# Patient Record
Sex: FEMALE | Race: WHITE | NOT HISPANIC OR LATINO | Employment: FULL TIME | URBAN - METROPOLITAN AREA
[De-identification: names, ages, dates, MRNs, and addresses within clinical notes are randomized per-mention and may not be internally consistent; named-entity substitution may affect disease eponyms.]

---

## 2017-07-10 ENCOUNTER — TRANSCRIBE ORDERS (OUTPATIENT)
Dept: ADMINISTRATIVE | Facility: HOSPITAL | Age: 52
End: 2017-07-10

## 2017-07-10 DIAGNOSIS — N64.9 DISORDER OF BREAST: Primary | ICD-10-CM

## 2017-07-17 ENCOUNTER — HOSPITAL ENCOUNTER (OUTPATIENT)
Dept: MAMMOGRAPHY | Facility: HOSPITAL | Age: 52
Discharge: HOME | End: 2017-07-17
Payer: COMMERCIAL

## 2017-07-17 ENCOUNTER — HOSPITAL ENCOUNTER (OUTPATIENT)
Dept: ULTRASOUND IMAGING | Facility: HOSPITAL | Age: 52
Discharge: HOME | End: 2017-07-17
Payer: COMMERCIAL

## 2017-07-17 DIAGNOSIS — N64.9 DISORDER OF BREAST: ICD-10-CM

## 2017-07-17 PROCEDURE — G0206 DX MAMMO INCL CAD UNI: HCPCS

## 2017-07-17 PROCEDURE — 76642 ULTRASOUND BREAST LIMITED: CPT

## 2018-02-23 ENCOUNTER — HOSPITAL ENCOUNTER (OUTPATIENT)
Dept: RADIOLOGY | Facility: HOSPITAL | Age: 53
Discharge: HOME | End: 2018-02-23
Payer: COMMERCIAL

## 2018-02-23 ENCOUNTER — TRANSCRIBE ORDERS (OUTPATIENT)
Dept: ADMINISTRATIVE | Facility: HOSPITAL | Age: 53
End: 2018-02-23

## 2018-02-23 DIAGNOSIS — M79.602 LEFT ARM PAIN: ICD-10-CM

## 2018-02-23 DIAGNOSIS — M79.602 LEFT ARM PAIN: Primary | ICD-10-CM

## 2018-02-23 PROCEDURE — 73090 X-RAY EXAM OF FOREARM: CPT

## 2018-02-23 PROCEDURE — 73080 X-RAY EXAM OF ELBOW: CPT

## 2019-07-25 ENCOUNTER — TRANSCRIBE ORDERS (OUTPATIENT)
Dept: ADMINISTRATIVE | Facility: HOSPITAL | Age: 54
End: 2019-07-25

## 2019-07-25 DIAGNOSIS — Z12.31 ENCOUNTER FOR SCREENING MAMMOGRAM FOR MALIGNANT NEOPLASM OF BREAST: Primary | ICD-10-CM

## 2019-08-13 ENCOUNTER — HOSPITAL ENCOUNTER (OUTPATIENT)
Dept: MAMMOGRAPHY | Facility: HOSPITAL | Age: 54
Discharge: HOME | End: 2019-08-13
Payer: COMMERCIAL

## 2019-08-13 VITALS — BODY MASS INDEX: 33.34 KG/M2 | HEIGHT: 68 IN | WEIGHT: 220 LBS

## 2019-08-13 DIAGNOSIS — Z12.31 ENCOUNTER FOR SCREENING MAMMOGRAM FOR MALIGNANT NEOPLASM OF BREAST: ICD-10-CM

## 2019-08-13 PROCEDURE — 77067 SCR MAMMO BI INCL CAD: CPT

## 2019-09-07 ENCOUNTER — HOSPITAL ENCOUNTER (INPATIENT)
Facility: HOSPITAL | Age: 54
LOS: 9 days | Discharge: HOME | DRG: 872 | End: 2019-09-16
Attending: EMERGENCY MEDICINE | Admitting: FAMILY MEDICINE
Payer: COMMERCIAL

## 2019-09-07 ENCOUNTER — APPOINTMENT (EMERGENCY)
Dept: RADIOLOGY | Facility: HOSPITAL | Age: 54
DRG: 872 | End: 2019-09-07
Payer: COMMERCIAL

## 2019-09-07 ENCOUNTER — APPOINTMENT (EMERGENCY)
Dept: CT IMAGING | Facility: HOSPITAL | Age: 54
DRG: 872 | End: 2019-09-07
Payer: COMMERCIAL

## 2019-09-07 DIAGNOSIS — R74.01 TRANSAMINITIS: ICD-10-CM

## 2019-09-07 DIAGNOSIS — E87.1 HYPONATREMIA: ICD-10-CM

## 2019-09-07 DIAGNOSIS — A41.9 SEPSIS (HCC): Primary | ICD-10-CM

## 2019-09-07 DIAGNOSIS — N13.30 HYDRONEPHROSIS: ICD-10-CM

## 2019-09-07 DIAGNOSIS — R50.9 FEVER: ICD-10-CM

## 2019-09-07 DIAGNOSIS — R10.11 RIGHT UPPER QUADRANT ABDOMINAL PAIN: ICD-10-CM

## 2019-09-07 DIAGNOSIS — R17 ELEVATED BILIRUBIN: ICD-10-CM

## 2019-09-07 DIAGNOSIS — N39.0 UTI (URINARY TRACT INFECTION): ICD-10-CM

## 2019-09-07 PROBLEM — R79.89 ELEVATED D-DIMER: Status: ACTIVE | Noted: 2019-09-07

## 2019-09-07 PROBLEM — N30.00 ACUTE CYSTITIS WITHOUT HEMATURIA: Status: ACTIVE | Noted: 2019-09-07

## 2019-09-07 LAB
ALBUMIN SERPL BCP-MCNC: 3.5 G/DL (ref 3.5–5)
ALP SERPL-CCNC: 222 U/L (ref 46–116)
ALT SERPL W P-5'-P-CCNC: 63 U/L (ref 12–78)
ANION GAP SERPL CALCULATED.3IONS-SCNC: 12 MMOL/L (ref 4–13)
APTT PPP: 30 SECONDS (ref 23–37)
AST SERPL W P-5'-P-CCNC: 69 U/L (ref 5–45)
BACTERIA UR QL AUTO: ABNORMAL /HPF
BASOPHILS # BLD MANUAL: 0.05 THOUSAND/UL (ref 0–0.1)
BASOPHILS NFR MAR MANUAL: 1 % (ref 0–1)
BILIRUB SERPL-MCNC: 2 MG/DL (ref 0.2–1)
BILIRUB UR QL STRIP: ABNORMAL
BUN SERPL-MCNC: 10 MG/DL (ref 5–25)
CALCIUM SERPL-MCNC: 8.7 MG/DL (ref 8.3–10.1)
CHLORIDE SERPL-SCNC: 96 MMOL/L (ref 100–108)
CLARITY UR: CLEAR
CO2 SERPL-SCNC: 24 MMOL/L (ref 21–32)
COLOR UR: ABNORMAL
CREAT SERPL-MCNC: 1.12 MG/DL (ref 0.6–1.3)
DEPRECATED D DIMER PPP: 5386 NG/ML (FEU)
EOSINOPHIL # BLD MANUAL: 0.1 THOUSAND/UL (ref 0–0.4)
EOSINOPHIL NFR BLD MANUAL: 2 % (ref 0–6)
ERYTHROCYTE [DISTWIDTH] IN BLOOD BY AUTOMATED COUNT: 14.7 % (ref 11.6–15.1)
GFR SERPL CREATININE-BSD FRML MDRD: 55 ML/MIN/1.73SQ M
GLUCOSE SERPL-MCNC: 142 MG/DL (ref 65–140)
GLUCOSE UR STRIP-MCNC: NEGATIVE MG/DL
HCT VFR BLD AUTO: 47.7 % (ref 34.8–46.1)
HGB BLD-MCNC: 15.3 G/DL (ref 11.5–15.4)
HGB UR QL STRIP.AUTO: NEGATIVE
INR PPP: 1.02 (ref 0.84–1.19)
KETONES UR STRIP-MCNC: ABNORMAL MG/DL
LACTATE SERPL-SCNC: 1.1 MMOL/L (ref 0.5–2)
LACTATE SERPL-SCNC: 1.2 MMOL/L (ref 0.5–2)
LEUKOCYTE ESTERASE UR QL STRIP: ABNORMAL
LYMPHOCYTES # BLD AUTO: 1.46 THOUSAND/UL (ref 0.6–4.47)
LYMPHOCYTES # BLD AUTO: 29 % (ref 14–44)
MCH RBC QN AUTO: 27.4 PG (ref 26.8–34.3)
MCHC RBC AUTO-ENTMCNC: 32.1 G/DL (ref 31.4–37.4)
MCV RBC AUTO: 86 FL (ref 82–98)
MONOCYTES # BLD AUTO: 0.45 THOUSAND/UL (ref 0–1.22)
MONOCYTES NFR BLD: 9 % (ref 4–12)
NEUTROPHILS # BLD MANUAL: 2.82 THOUSAND/UL (ref 1.85–7.62)
NEUTS SEG NFR BLD AUTO: 56 % (ref 43–75)
NITRITE UR QL STRIP: POSITIVE
NON-SQ EPI CELLS URNS QL MICRO: ABNORMAL /HPF
NRBC BLD AUTO-RTO: 0 /100 WBCS
PH UR STRIP.AUTO: 5.5 [PH]
PLATELET # BLD AUTO: 115 THOUSANDS/UL (ref 149–390)
PLATELET # BLD AUTO: 96 THOUSANDS/UL (ref 149–390)
PLATELET BLD QL SMEAR: ABNORMAL
PMV BLD AUTO: 10.6 FL (ref 8.9–12.7)
PMV BLD AUTO: 10.7 FL (ref 8.9–12.7)
POTASSIUM SERPL-SCNC: 3.8 MMOL/L (ref 3.5–5.3)
PROT SERPL-MCNC: 7.9 G/DL (ref 6.4–8.2)
PROT UR STRIP-MCNC: ABNORMAL MG/DL
PROTHROMBIN TIME: 13.4 SECONDS (ref 11.6–14.5)
RBC # BLD AUTO: 5.58 MILLION/UL (ref 3.81–5.12)
RBC #/AREA URNS AUTO: ABNORMAL /HPF
SODIUM SERPL-SCNC: 132 MMOL/L (ref 136–145)
SP GR UR STRIP.AUTO: >=1.03 (ref 1–1.03)
TOTAL CELLS COUNTED SPEC: 100
TROPONIN I SERPL-MCNC: <0.02 NG/ML
UROBILINOGEN UR QL STRIP.AUTO: 4 E.U./DL
VARIANT LYMPHS # BLD AUTO: 3 %
WBC # BLD AUTO: 5.03 THOUSAND/UL (ref 4.31–10.16)
WBC #/AREA URNS AUTO: ABNORMAL /HPF

## 2019-09-07 PROCEDURE — 99222 1ST HOSP IP/OBS MODERATE 55: CPT | Performed by: PHYSICIAN ASSISTANT

## 2019-09-07 PROCEDURE — 36415 COLL VENOUS BLD VENIPUNCTURE: CPT | Performed by: EMERGENCY MEDICINE

## 2019-09-07 PROCEDURE — 85730 THROMBOPLASTIN TIME PARTIAL: CPT | Performed by: EMERGENCY MEDICINE

## 2019-09-07 PROCEDURE — 84145 PROCALCITONIN (PCT): CPT | Performed by: EMERGENCY MEDICINE

## 2019-09-07 PROCEDURE — 85379 FIBRIN DEGRADATION QUANT: CPT | Performed by: EMERGENCY MEDICINE

## 2019-09-07 PROCEDURE — NC001 PR NO CHARGE: Performed by: EMERGENCY MEDICINE

## 2019-09-07 PROCEDURE — 99285 EMERGENCY DEPT VISIT HI MDM: CPT

## 2019-09-07 PROCEDURE — 85049 AUTOMATED PLATELET COUNT: CPT | Performed by: PHYSICIAN ASSISTANT

## 2019-09-07 PROCEDURE — 99285 EMERGENCY DEPT VISIT HI MDM: CPT | Performed by: EMERGENCY MEDICINE

## 2019-09-07 PROCEDURE — 83605 ASSAY OF LACTIC ACID: CPT | Performed by: EMERGENCY MEDICINE

## 2019-09-07 PROCEDURE — 85007 BL SMEAR W/DIFF WBC COUNT: CPT | Performed by: EMERGENCY MEDICINE

## 2019-09-07 PROCEDURE — 71275 CT ANGIOGRAPHY CHEST: CPT

## 2019-09-07 PROCEDURE — 84145 PROCALCITONIN (PCT): CPT | Performed by: PHYSICIAN ASSISTANT

## 2019-09-07 PROCEDURE — 71046 X-RAY EXAM CHEST 2 VIEWS: CPT

## 2019-09-07 PROCEDURE — 96365 THER/PROPH/DIAG IV INF INIT: CPT

## 2019-09-07 PROCEDURE — 87040 BLOOD CULTURE FOR BACTERIA: CPT | Performed by: EMERGENCY MEDICINE

## 2019-09-07 PROCEDURE — 84300 ASSAY OF URINE SODIUM: CPT | Performed by: PHYSICIAN ASSISTANT

## 2019-09-07 PROCEDURE — 85610 PROTHROMBIN TIME: CPT | Performed by: EMERGENCY MEDICINE

## 2019-09-07 PROCEDURE — 93005 ELECTROCARDIOGRAM TRACING: CPT

## 2019-09-07 PROCEDURE — 80053 COMPREHEN METABOLIC PANEL: CPT | Performed by: EMERGENCY MEDICINE

## 2019-09-07 PROCEDURE — 85027 COMPLETE CBC AUTOMATED: CPT | Performed by: EMERGENCY MEDICINE

## 2019-09-07 PROCEDURE — 84484 ASSAY OF TROPONIN QUANT: CPT | Performed by: EMERGENCY MEDICINE

## 2019-09-07 PROCEDURE — 81001 URINALYSIS AUTO W/SCOPE: CPT | Performed by: EMERGENCY MEDICINE

## 2019-09-07 PROCEDURE — 74176 CT ABD & PELVIS W/O CONTRAST: CPT

## 2019-09-07 PROCEDURE — 83935 ASSAY OF URINE OSMOLALITY: CPT | Performed by: PHYSICIAN ASSISTANT

## 2019-09-07 RX ORDER — SODIUM CHLORIDE 9 MG/ML
100 INJECTION, SOLUTION INTRAVENOUS CONTINUOUS
Status: DISCONTINUED | OUTPATIENT
Start: 2019-09-07 | End: 2019-09-13

## 2019-09-07 RX ORDER — LEVOFLOXACIN 5 MG/ML
750 INJECTION, SOLUTION INTRAVENOUS ONCE
Status: COMPLETED | OUTPATIENT
Start: 2019-09-07 | End: 2019-09-07

## 2019-09-07 RX ORDER — OXYCODONE HYDROCHLORIDE AND ACETAMINOPHEN 5; 325 MG/1; MG/1
1 TABLET ORAL ONCE
Status: COMPLETED | OUTPATIENT
Start: 2019-09-07 | End: 2019-09-08

## 2019-09-07 RX ORDER — ONDANSETRON 2 MG/ML
4 INJECTION INTRAMUSCULAR; INTRAVENOUS EVERY 6 HOURS PRN
Status: DISCONTINUED | OUTPATIENT
Start: 2019-09-07 | End: 2019-09-16 | Stop reason: HOSPADM

## 2019-09-07 RX ORDER — ACETAMINOPHEN 325 MG/1
650 TABLET ORAL ONCE
Status: COMPLETED | OUTPATIENT
Start: 2019-09-07 | End: 2019-09-07

## 2019-09-07 RX ORDER — ACETAMINOPHEN 325 MG/1
650 TABLET ORAL EVERY 6 HOURS PRN
Status: DISCONTINUED | OUTPATIENT
Start: 2019-09-07 | End: 2019-09-16 | Stop reason: HOSPADM

## 2019-09-07 RX ADMIN — SODIUM CHLORIDE 125 ML/HR: 0.9 INJECTION, SOLUTION INTRAVENOUS at 21:51

## 2019-09-07 RX ADMIN — LEVOFLOXACIN 750 MG: 5 INJECTION, SOLUTION INTRAVENOUS at 17:12

## 2019-09-07 RX ADMIN — SODIUM CHLORIDE, SODIUM LACTATE, POTASSIUM CHLORIDE, AND CALCIUM CHLORIDE 1000 ML: .6; .31; .03; .02 INJECTION, SOLUTION INTRAVENOUS at 17:12

## 2019-09-07 RX ADMIN — ACETAMINOPHEN 650 MG: 325 TABLET, FILM COATED ORAL at 17:06

## 2019-09-07 RX ADMIN — IOHEXOL 85 ML: 350 INJECTION, SOLUTION INTRAVENOUS at 19:47

## 2019-09-07 RX ADMIN — SODIUM CHLORIDE, SODIUM LACTATE, POTASSIUM CHLORIDE, AND CALCIUM CHLORIDE 1000 ML: .6; .31; .03; .02 INJECTION, SOLUTION INTRAVENOUS at 17:32

## 2019-09-07 RX ADMIN — SODIUM CHLORIDE, SODIUM LACTATE, POTASSIUM CHLORIDE, AND CALCIUM CHLORIDE 1000 ML: .6; .31; .03; .02 INJECTION, SOLUTION INTRAVENOUS at 19:24

## 2019-09-07 RX ADMIN — ACETAMINOPHEN 650 MG: 325 TABLET, FILM COATED ORAL at 23:07

## 2019-09-07 NOTE — ED PROVIDER NOTES
History  Chief Complaint   Patient presents with    Fever - 9 weeks to 74 years     fever for 4 days and urinary frequency     Patient: Rsoie Koo  53 y o /female  YOB: 1965  MRN: [id number]  PCP: Harinder Comer DO  Date of evaluation: 2019    (N B   Voice-recognition software may have been used in the preparation of this document  Occasional wrong word or "sound-alike" substitutions may have occurred due to the inherent limitations of voice recognition software  Interpretation should be guided by context )    History provided by:  Patient and spouse  Fever - 9 weeks to 76 years   Max temp prior to arrival:  104 8  Severity:  Severe  Onset quality:  Unable to specify  Duration:  4 days  Timing:  Unable to specify  Progression:  Unable to specify  Chronicity:  New  Relieved by:  Nothing  Worsened by:  Nothing  Associated symptoms: chills, dysuria, headaches (frontal) and myalgias    Associated symptoms: no chest pain, no confusion, no cough, no diarrhea, no nausea, no rash and no vomiting    Dysuria:     Severity:  Severe    Onset quality:  Gradual    Duration:  4 days    Timing:  Constant (sensation of incomplete voiding; urgency)    Progression:  Worsening    Chronicity:  New      None       No past medical history on file  Past Surgical History:   Procedure Laterality Date    APPENDECTOMY       SECTION      HYSTERECTOMY             Family History   Problem Relation Age of Onset    No Known Problems Mother     No Known Problems Sister     No Known Problems Sister     No Known Problems Maternal Aunt     No Known Problems Maternal Aunt     No Known Problems Paternal Aunt     Cancer Paternal Aunt     No Known Problems Paternal Aunt      I have reviewed and agree with the history as documented      Social History     Tobacco Use    Smoking status: Never Smoker    Smokeless tobacco: Never Used   Substance Use Topics    Alcohol use: Yes     Comment: occassional    Drug use: Never        Review of Systems   Constitutional: Positive for chills, fatigue and fever  HENT: Negative for hearing loss, trouble swallowing and voice change  Eyes: Negative for pain, redness and visual disturbance  Respiratory: Positive for shortness of breath (and raspy when she breathes)  Negative for cough  Cardiovascular: Negative for chest pain and palpitations  Gastrointestinal: Negative for abdominal pain, constipation, diarrhea, nausea and vomiting  Genitourinary: Positive for decreased urine volume, dysuria, frequency and urgency  Negative for hematuria, vaginal bleeding and vaginal discharge  Sensation of incomplete voiding   Musculoskeletal: Positive for myalgias  Negative for back pain, gait problem and neck pain  Skin: Negative for color change and rash  Neurological: Positive for headaches (frontal)  Negative for weakness and light-headedness  Psychiatric/Behavioral: Negative for agitation, confusion and decreased concentration  The patient is not nervous/anxious  All other systems reviewed and are negative  Physical Exam  Physical Exam   Constitutional: She is oriented to person, place, and time  She appears well-developed and well-nourished  She is cooperative  Non-toxic appearance  She appears ill  No distress  HENT:   Mouth/Throat: Oropharynx is clear and moist and mucous membranes are normal    Voice normal   Eyes: Pupils are equal, round, and reactive to light  EOM are normal    Neck: Normal range of motion, full passive range of motion without pain and phonation normal  Neck supple  No Brudzinski's sign and no Kernig's sign noted  Cardiovascular: Normal rate and regular rhythm  Pulmonary/Chest: Effort normal  She has rales  Abdominal: Soft  Bowel sounds are normal    Neurological: She is alert and oriented to person, place, and time  GCS eye subscore is 4  GCS verbal subscore is 5  GCS motor subscore is 6     Skin: Skin is warm and dry  Capillary refill takes 2 to 3 seconds  She is not diaphoretic  Psychiatric: She has a normal mood and affect  Her speech is normal and behavior is normal    Nursing note and vitals reviewed  Vital Signs  ED Triage Vitals [09/07/19 1649]   Temperature Pulse Respirations Blood Pressure SpO2   (!) 100 9 °F (38 3 °C) (!) 108 20 127/79 96 %      Temp Source Heart Rate Source Patient Position - Orthostatic VS BP Location FiO2 (%)   Oral Right Sitting Right arm --      Pain Score       8           Vitals:    09/07/19 1815 09/07/19 1830 09/07/19 1845 09/07/19 1900   BP: 134/64 130/62 122/58 116/62   Pulse: 86 87 88 84   Patient Position - Orthostatic VS: Sitting Sitting Sitting Sitting         Visual Acuity      ED Medications  Medications   lactated ringers bolus 1,000 mL (0 mL Intravenous Stopped 9/7/19 1742)     Followed by   lactated ringers bolus 1,000 mL (0 mL Intravenous Stopped 9/7/19 1802)   levofloxacin (LEVAQUIN) IVPB (premix) 750 mg (0 mg Intravenous Stopped 9/7/19 1842)   acetaminophen (TYLENOL) tablet 650 mg (650 mg Oral Given 9/7/19 1706)   lactated ringers bolus 1,000 mL (1,000 mL Intravenous New Bag 9/7/19 1924)       Diagnostic Studies  Results Reviewed     Procedure Component Value Units Date/Time    Lactic acid x2 [[id number]] Collected:  09/07/19 1922    Lab Status:   In process Specimen:  Blood from Arm, Right Updated:  09/07/19 1928    D-dimer, quantitative [[id number]]  (Abnormal) Collected:  09/07/19 1841    Lab Status:  Final result Specimen:  Blood Updated:  09/07/19 1912     D-Dimer, Quant 5,386 ng/ml (FEU)     CBC and differential [[id number]]  (Abnormal) Collected:  09/07/19 1704    Lab Status:  Final result Specimen:  Blood from Arm, Right Updated:  09/07/19 1731     WBC 5 03 Thousand/uL      RBC 5 58 Million/uL      Hemoglobin 15 3 g/dL      Hematocrit 47 7 %      MCV 86 fL      MCH 27 4 pg      MCHC 32 1 g/dL      RDW 14 7 %      MPV 10 7 fL      Platelets 793 Thousands/uL nRBC 0 /100 WBCs     Lactic acid x2 [[id number]]  (Normal) Collected:  09/07/19 1704    Lab Status:  Final result Specimen:  Blood from Arm, Right Updated:  09/07/19 1731     LACTIC ACID 1 2 mmol/L     Narrative:       Result may be elevated if tourniquet was used during collection      Troponin I [[id number]]  (Normal) Collected:  09/07/19 1704    Lab Status:  Final result Specimen:  Blood from Arm, Right Updated:  09/07/19 1729     Troponin I <0 02 ng/mL     Urine Microscopic [[id number]]  (Abnormal) Collected:  09/07/19 1711    Lab Status:  Final result Specimen:  Urine, Clean Catch Updated:  09/07/19 1727     RBC, UA 1-2 /hpf      WBC, UA 2-4 /hpf      Epithelial Cells Occasional /hpf      Bacteria, UA Innumerable /hpf     Comprehensive metabolic panel [[id number]]  (Abnormal) Collected:  09/07/19 1704    Lab Status:  Final result Specimen:  Blood from Arm, Right Updated:  09/07/19 1727     Sodium 132 mmol/L      Potassium 3 8 mmol/L      Chloride 96 mmol/L      CO2 24 mmol/L      ANION GAP 12 mmol/L      BUN 10 mg/dL      Creatinine 1 12 mg/dL      Glucose 142 mg/dL      Calcium 8 7 mg/dL      AST 69 U/L      ALT 63 U/L      Alkaline Phosphatase 222 U/L      Total Protein 7 9 g/dL      Albumin 3 5 g/dL      Total Bilirubin 2 00 mg/dL      eGFR 55 ml/min/1 73sq m     Narrative:       [place] guidelines for Chronic Kidney Disease (CKD):     Stage 1 with normal or high GFR (GFR > 90 mL/min/1 73 square meters)    Stage 2 Mild CKD (GFR = 60-89 mL/min/1 73 square meters)    Stage 3A Moderate CKD (GFR = 45-59 mL/min/1 73 square meters)    Stage 3B Moderate CKD (GFR = 30-44 mL/min/1 73 square meters)    Stage 4 Severe CKD (GFR = 15-29 mL/min/1 73 square meters)    Stage 5 End Stage CKD (GFR <15 mL/min/1 73 square meters)  Note: GFR calculation is accurate only with a steady state creatinine    Protime-INR [[id number]]  (Normal) Collected:  09/07/19 1704    Lab Status:  Final result Specimen: Blood from Arm, Right Updated:  09/07/19 1722     Protime 13 4 seconds      INR 1 02    APTT [[id number]]  (Normal) Collected:  09/07/19 1704    Lab Status:  Final result Specimen:  Blood from Arm, Right Updated:  09/07/19 1722     PTT 30 seconds     UA w Reflex to Microscopic w Reflex to Culture [[id number]]  (Abnormal) Collected:  09/07/19 1711    Lab Status:  Final result Specimen:  Urine, Clean Catch Updated:  09/07/19 1721     Color, UA Didi     Clarity, UA Clear     Specific Gravity, UA >=1 030     pH, UA 5 5     Leukocytes, UA Trace     Nitrite, UA Positive     Protein,  (2+) mg/dl      Glucose, UA Negative mg/dl      Ketones, UA 15 (1+) mg/dl      Urobilinogen, UA 4 0 E U /dl      Bilirubin, UA Interference- unable to analyze     Blood, UA Negative    Procalcitonin [[id number]] Collected:  09/07/19 1704    Lab Status: In process Specimen:  Blood from Arm, Right Updated:  09/07/19 1709    Blood culture #1 [[id number]] Collected:  09/07/19 1704    Lab Status: In process Specimen:  Blood from Arm, Left Updated:  09/07/19 1708    Blood culture #2 [[id number]] Collected:  09/07/19 1704    Lab Status: In process Specimen:  Blood from Arm, Right Updated:  09/07/19 1708                 XR chest pa & lateral   Final Result by Jose Aguilar MD (09/07 1821)      No active pulmonary disease  Workstation performed: [id number]         CT renal stone study abdomen pelvis wo contrast    (Results Pending)   CTA ed chest pe study    (Results Pending)              Procedures  Procedures       ED Course  ED Course as of Sep 07 1939   Sat Sep 07, 2019   1738 Leukocytes, UA(!): Trace   1738 Nitrite, UA(!): Positive   1738 Ketones, UA(!): 15 (1+)   1738 Bacteria, UA(!): Innumerable   1738 WBC: 5 03   1738 Hemoglobin: 15 3   1738 Platelet Count(!): 011   1739 Alkaline Phosphatase(!): 222   1739 Creatinine: 1 12   1912 Sepsis Alert called 1907 1923 Signed out to Dr Candis Jarquin pending CT scans    Antibiotics have been given   Pt was re-assessed after her sepsis bolus  Given low UO, she is receiving more fluids  Given low sat and inability to [facility] out, she will get a PE study as well as her stone study  HEART Risk Score      Most Recent Value   History  0 Filed at: 09/07/2019 1938   ECG  0 Filed at: 09/07/2019 1938   Age  1 Filed at: 09/07/2019 1938   Risk Factors  0 Filed at: 09/07/2019 1938   Troponin  0 Filed at: 09/07/2019 1938   Heart Score Risk Calculator   History  0 Filed at: 09/07/2019 1938   ECG  0 Filed at: 09/07/2019 1938   Age  1 Filed at: 09/07/2019 1938   Risk Factors  0 Filed at: 09/07/2019 1938   Troponin  0 Filed at: 09/07/2019 1938   HEART Score  1 Filed at: 09/07/2019 1938   HEART Score  1 Filed at: 09/07/2019 1938            PERC Rule for PE      Most Recent Value   PERC Rule for PE   Age >=50  1 Filed at: 09/07/2019 1922   HR >=100  1 Filed at: 09/07/2019 1922   O2 Sat on room air < 95%  1 Filed at: 09/07/2019 1922   History of PE or DVT  0 Filed at: 09/07/2019 1922   Recent trauma or surgery  0 Filed at: 09/07/2019 1922   Hemoptysis  0 Filed at: 09/07/2019 1922   Exogenous estrogen  0 Filed at: 09/07/2019 1922   Unilateral leg swelling  0 Filed at: 09/07/2019 1 [facility] Rule for PE Results  3 Filed at: 09/07/2019 1922          Initial Sepsis Screening     Row Name 09/07/19 1904 09/07/19 4536             Is the patient's history suggestive of a new or worsening infection?   (!) Yes (Proceed)  -DA       Suspected source of infection    urinary tract infection  -DA       Are two or more of the following signs & symptoms of infection both present and new to the patient?          Indicate SIRS criteria    Hyperthemia > 38 3C (100 9F); Tachycardia > 90 bpm  -DA       If the answer is yes to both questions, suspicion of sepsis is present  [de-identified]         If severe sepsis is present AND tissue hypoperfusion perists in the hour after fluid resuscitation or lactate > 4, the patient meets criteria for SEPTIC SHOCK           Are any of the following organ dysfunction criteria present within 6 hours of suspected infection and SIRS criteria that are NOT considered to be chronic conditions?          Organ dysfunction  Urine output < 0 5 mL/kg/hour for 2 hours just at borderline  -DA         Date of presentation of severe sepsis  09/07/19  -DA         Time of presentation of severe sepsis  1907  -DA         Tissue hypoperfusion persists in the hour after crystalloid fluid administration, evidenced, by either:           Was hypotension present within one hour of the conclusion of crystalloid fluid administration? Tejal Moss       Date of presentation of septic shock           Time of presentation of septic shock             User Key  (r) = Recorded By, (t) = Taken By, (c) = Cosigned By    [address] Name Provider Type    DA Ford Mooney MD Physician           Default Flowsheet Data (last 720 hours)      Sepsis Reassess     Row Name 09/07/19 1931 09/07/19 1816                Repeat Volume Status and Tissue Perfusion Assessment Performed    Repeat Volume Status and Tissue Perfusion Assessment Performed    Yes  -DA          Volume Status and Tissue Perfusion Post Fluid Resuscitation * Must Document All *    Vital Signs Reviewed (HR, RR, BP, T)  Yes  -DA  Yes  -DA       Shock Index Reviewed  Yes  -DA  Yes  -DA       Arterial Oxygen Saturation Reviewed (POx, SaO2 or SpO2)  Yes (comment %)  -DA  Yes (comment %)  -DA       Cardio  Normal S1/S2; Regular rate and rhythm  -DA  Normal S1/S2; Regular rate and rhythm  -DA       Pulmonary  (!) Normal effort;Rales  -DA  (!) Normal effort;Rales  -DA       Capillary Refill  Brisk  -DA  Brisk  -DA       Peripheral Pulses  Radial;Dorsalis Pedis  -DA  Radial;Dorsalis Pedis  -DA       Peripheral Pulse  +2  -DA  +2  -DA       Dorsalis Pedis  +2  -DA  +2  -DA       Skin  Warm;Dry  -DA  Warm;Dry  -DA       Urine output assessed   borderline  -DA  Other (comment) pending 2 hour raphael -DA          *OR*   Intensive Monitoring- Must Document One of the Following Four *:    Vital Signs Reviewed           * Central Venous Pressure (CVP or RAP)           * Central Venous Oxygen (SVO2, ScvO2 or Oxygen saturation via central catheter)           * Bedside Cardiovascular US in IVC diameter and % collapse           * Passive Leg Raise OR Crystalloid Challenge             User Key  (r) = Recorded By, (t) = Taken By, (c) = Cosigned By    Initials Name Provider Type    DA Trace Goel MD Physician          Angella Burns' Criteria for PE      Most Recent Value   Wells' Criteria for PE   Clinical signs and symptoms of DVT  0 Filed at: 09/07/2019 1921   PE is primary diagnosis or equally likely  0 Filed at: 09/07/2019 1921   HR >100  1 5 Filed at: 09/07/2019 1921   Immobilization at least 3 days or Surgery in the previous 4 weeks  0 Filed at: 09/07/2019 1921   Previous, objectively diagnosed PE or DVT  0 Filed at: 09/07/2019 1921   Hemoptysis  0 Filed at: 09/07/2019 1921   Malignancy with treatment within 6 months or palliative  0 Filed at: 09/07/2019 1921   Wells' Criteria Total  1 5 Filed at: 09/07/2019 1921            MDM  Number of Diagnoses or Management Options     Amount and/or Complexity of Data Reviewed  Tests in the radiology section of CPT®: ordered and reviewed  Tests in the medicine section of CPT®: ordered and reviewed  Independent visualization of images, tracings, or specimens: yes    Risk of Complications, Morbidity, and/or Mortality  Presenting problems: high    Patient Progress  Patient progress: improved      Disposition  Final diagnoses:   None     ED Disposition     None      Follow-up Information    None         Patient's Medications    No medications on file     No discharge procedures on file      ED Provider  Electronically Signed by           rTace Goel MD  09/10/19 1964

## 2019-09-07 NOTE — ED PROCEDURE NOTE
PROCEDURE  ECG 12 Lead Documentation Only  Date/Time: 9/7/2019 4:58 PM  Performed by: Richard Rome MD  Authorized by: Richard Rome MD     Indications / Diagnosis:  Tachycardia  ECG reviewed by me, the ED Provider: yes    Patient location:  ED  Previous ECG:     Comparison to cardiac monitor: Yes    Interpretation:     Interpretation: normal    Rate:     ECG rate:  99    ECG rate assessment: normal    Rhythm:     Rhythm: sinus rhythm    Ectopy:     Ectopy: none    QRS:     QRS axis:  Normal    QRS intervals:  Normal  Conduction:     Conduction: normal    ST segments:     ST segments:  Normal  T waves:     T waves: normal           Richard Rome MD  09/07/19 7992

## 2019-09-08 ENCOUNTER — APPOINTMENT (INPATIENT)
Dept: NON INVASIVE DIAGNOSTICS | Facility: HOSPITAL | Age: 54
DRG: 872 | End: 2019-09-08
Payer: COMMERCIAL

## 2019-09-08 PROBLEM — N13.30 HYDROURETERONEPHROSIS: Status: ACTIVE | Noted: 2019-09-08

## 2019-09-08 LAB
ALBUMIN SERPL BCP-MCNC: 2.8 G/DL (ref 3.5–5)
ALP SERPL-CCNC: 195 U/L (ref 46–116)
ALT SERPL W P-5'-P-CCNC: 49 U/L (ref 12–78)
ANION GAP SERPL CALCULATED.3IONS-SCNC: 7 MMOL/L (ref 4–13)
AST SERPL W P-5'-P-CCNC: 58 U/L (ref 5–45)
BILIRUB SERPL-MCNC: 2.6 MG/DL (ref 0.2–1)
BUN SERPL-MCNC: 8 MG/DL (ref 5–25)
CALCIUM SERPL-MCNC: 8.2 MG/DL (ref 8.3–10.1)
CHLORIDE SERPL-SCNC: 100 MMOL/L (ref 100–108)
CO2 SERPL-SCNC: 27 MMOL/L (ref 21–32)
CREAT SERPL-MCNC: 0.94 MG/DL (ref 0.6–1.3)
ERYTHROCYTE [DISTWIDTH] IN BLOOD BY AUTOMATED COUNT: 14.7 % (ref 11.6–15.1)
GFR SERPL CREATININE-BSD FRML MDRD: 69 ML/MIN/1.73SQ M
GLUCOSE SERPL-MCNC: 96 MG/DL (ref 65–140)
HCT VFR BLD AUTO: 42.1 % (ref 34.8–46.1)
HGB BLD-MCNC: 13.3 G/DL (ref 11.5–15.4)
MAGNESIUM SERPL-MCNC: 1.9 MG/DL (ref 1.6–2.6)
MCH RBC QN AUTO: 27.4 PG (ref 26.8–34.3)
MCHC RBC AUTO-ENTMCNC: 31.6 G/DL (ref 31.4–37.4)
MCV RBC AUTO: 87 FL (ref 82–98)
OSMOLALITY UR: 280 MMOL/KG
PHOSPHATE SERPL-MCNC: 3.1 MG/DL (ref 2.7–4.5)
PLATELET # BLD AUTO: 90 THOUSANDS/UL (ref 149–390)
PMV BLD AUTO: 11.7 FL (ref 8.9–12.7)
POTASSIUM SERPL-SCNC: 3.6 MMOL/L (ref 3.5–5.3)
PROCALCITONIN SERPL-MCNC: 0.47 NG/ML
PROCALCITONIN SERPL-MCNC: 0.5 NG/ML
PROCALCITONIN SERPL-MCNC: 0.67 NG/ML
PROT SERPL-MCNC: 6.5 G/DL (ref 6.4–8.2)
RBC # BLD AUTO: 4.85 MILLION/UL (ref 3.81–5.12)
SODIUM 24H UR-SCNC: 72 MOL/L
SODIUM SERPL-SCNC: 134 MMOL/L (ref 136–145)
WBC # BLD AUTO: 3.61 THOUSAND/UL (ref 4.31–10.16)

## 2019-09-08 PROCEDURE — G8978 MOBILITY CURRENT STATUS: HCPCS | Performed by: PHYSICAL THERAPIST

## 2019-09-08 PROCEDURE — 84100 ASSAY OF PHOSPHORUS: CPT | Performed by: PHYSICIAN ASSISTANT

## 2019-09-08 PROCEDURE — 83735 ASSAY OF MAGNESIUM: CPT | Performed by: PHYSICIAN ASSISTANT

## 2019-09-08 PROCEDURE — 93970 EXTREMITY STUDY: CPT | Performed by: SURGERY

## 2019-09-08 PROCEDURE — 84145 PROCALCITONIN (PCT): CPT | Performed by: PHYSICIAN ASSISTANT

## 2019-09-08 PROCEDURE — 87086 URINE CULTURE/COLONY COUNT: CPT | Performed by: FAMILY MEDICINE

## 2019-09-08 PROCEDURE — 93970 EXTREMITY STUDY: CPT

## 2019-09-08 PROCEDURE — G8980 MOBILITY D/C STATUS: HCPCS | Performed by: PHYSICAL THERAPIST

## 2019-09-08 PROCEDURE — 99232 SBSQ HOSP IP/OBS MODERATE 35: CPT | Performed by: PHYSICIAN ASSISTANT

## 2019-09-08 PROCEDURE — G8979 MOBILITY GOAL STATUS: HCPCS | Performed by: PHYSICAL THERAPIST

## 2019-09-08 PROCEDURE — 80053 COMPREHEN METABOLIC PANEL: CPT | Performed by: PHYSICIAN ASSISTANT

## 2019-09-08 PROCEDURE — 97162 PT EVAL MOD COMPLEX 30 MIN: CPT | Performed by: PHYSICAL THERAPIST

## 2019-09-08 PROCEDURE — 85027 COMPLETE CBC AUTOMATED: CPT | Performed by: PHYSICIAN ASSISTANT

## 2019-09-08 RX ORDER — HYDROMORPHONE HCL/PF 1 MG/ML
0.5 SYRINGE (ML) INJECTION EVERY 4 HOURS PRN
Status: DISCONTINUED | OUTPATIENT
Start: 2019-09-08 | End: 2019-09-16 | Stop reason: HOSPADM

## 2019-09-08 RX ORDER — AZTREONAM 1 G/1
1000 INJECTION, POWDER, LYOPHILIZED, FOR SOLUTION INTRAMUSCULAR; INTRAVENOUS EVERY 8 HOURS
Status: DISCONTINUED | OUTPATIENT
Start: 2019-09-08 | End: 2019-09-08

## 2019-09-08 RX ORDER — LEVOFLOXACIN 5 MG/ML
500 INJECTION, SOLUTION INTRAVENOUS EVERY 24 HOURS
Status: DISCONTINUED | OUTPATIENT
Start: 2019-09-08 | End: 2019-09-10

## 2019-09-08 RX ADMIN — ONDANSETRON 4 MG: 2 INJECTION INTRAMUSCULAR; INTRAVENOUS at 12:13

## 2019-09-08 RX ADMIN — SODIUM CHLORIDE 75 ML/HR: 0.9 INJECTION, SOLUTION INTRAVENOUS at 22:13

## 2019-09-08 RX ADMIN — ACETAMINOPHEN 650 MG: 325 TABLET, FILM COATED ORAL at 12:19

## 2019-09-08 RX ADMIN — SODIUM CHLORIDE 125 ML/HR: 0.9 INJECTION, SOLUTION INTRAVENOUS at 09:08

## 2019-09-08 RX ADMIN — AZTREONAM 1000 MG: 1 INJECTION, POWDER, LYOPHILIZED, FOR SOLUTION INTRAMUSCULAR; INTRAVENOUS at 23:16

## 2019-09-08 RX ADMIN — OXYCODONE HYDROCHLORIDE AND ACETAMINOPHEN 1 TABLET: 5; 325 TABLET ORAL at 06:10

## 2019-09-08 RX ADMIN — AZTREONAM 1000 MG: 1 INJECTION, POWDER, LYOPHILIZED, FOR SOLUTION INTRAMUSCULAR; INTRAVENOUS at 16:54

## 2019-09-08 RX ADMIN — ONDANSETRON 4 MG: 2 INJECTION INTRAMUSCULAR; INTRAVENOUS at 19:07

## 2019-09-08 RX ADMIN — HYDROMORPHONE HYDROCHLORIDE 0.5 MG: 1 INJECTION, SOLUTION INTRAMUSCULAR; INTRAVENOUS; SUBCUTANEOUS at 19:05

## 2019-09-08 RX ADMIN — ACETAMINOPHEN 650 MG: 325 TABLET, FILM COATED ORAL at 22:14

## 2019-09-08 RX ADMIN — ENOXAPARIN SODIUM 40 MG: 40 INJECTION SUBCUTANEOUS at 09:09

## 2019-09-08 RX ADMIN — LEVOFLOXACIN 500 MG: 5 INJECTION, SOLUTION INTRAVENOUS at 17:40

## 2019-09-08 NOTE — PROGRESS NOTES
Progress Note - Christina Chaidez 1965, 52 y o  female MRN: [id number]    Unit/Bed#: 410-01 Encounter: [id number]    Primary Care Provider: Patrizia Doherty DO   Date and time admitted to hospital: 9/7/2019  4:45 PM        * Sepsis secondary to UTI [facility])  Assessment & Plan  Present on admission with and Fever, tachycardia in the setting of abnormal urinalysis suggestive of UTI, and exam and CT findings suggestive of probable early pyelonephritis  Sepsis protocol initiated in the ER including IV fluid hydration and IV Levaquin  Will continue IV levaquin but add IV aztreonam for broader coverage given persistent fever today  Note cefepime was considered but avoided due to significant allergy to penicillin  Blood cultures pending, urine cultures pending  Procalcitonin pending  Hydronephrosis  Assessment & Plan  CT abdomen and pelvis on admission :   IMPRESSION:  1  No urinary tract calculi  2   Bilateral hydronephrosis, mild on the right and moderate on the left, developing since a sonogram from 10/7/2016  Will follow urinary retention protocol  Check post void residuals  Strain all urine - no radiopaque stones noted on CT scan but new hydronephrosis with flank pain and UTI will need close monitoring for possible stone  Consult urology  Consider repeat renal ultrasound - inpatient vs  Outpatient - to ensure improvement        Acute cystitis without hematuria  Assessment & Plan  UA results consistent with UTI  Hydronephrosis, persistent fever, and flank pain today  Probable early pyelonephritis  He is febrile upon arrival to the ER  Started on IV Levaquin - will add IV aztreonam for broader coverage given persistent fever today  Of note, cefepime was considered but avoided due to significant allergy to penicillin  Blood and urine cultures pending      Hyponatremia  Assessment & Plan  A sodium level at 132 on admission , improved to 134 today    Most likely hypovolemic hyponatremia, Low PO intake  Continue IV normal saline, but will reduce rate to 75mL/hr  Elevated d-dimer  Assessment & Plan  Likely in the setting of acute infection / sepsis  D-dimer level at 5,386   CT pe study negative for acute PE  Venous duplex study of the BLE negative for DVT  VTE Pharmacologic Prophylaxis:   Pharmacologic: Enoxaparin (Lovenox)  Mechanical VTE Prophylaxis in Place: Yes        Time Spent for Care: 30 minutes  More than 50% of total time spent on counseling and coordination of care as described above  Current Length of Stay: 1 day(s)    Current Patient Status: Inpatient   Certification Statement: The patient will continue to require additional inpatient hospital stay due to need for IV antibiotics, urology consultation  Discharge Plan / Estimated Discharge Date: TBD      Code Status: Level 1 - Full Code      Subjective:   Patient is feeling only slightly better today  Currently experiencing nausea, fever  Objective:   Vitals:   Temp (24hrs), Av 9 °F (37 7 °C), Min:97 8 °F (36 6 °C), Max:103 3 °F (39 6 °C)    Temp:  [97 8 °F (36 6 °C)-103 3 °F (39 6 °C)] 98 3 °F (36 8 °C)  HR:  [] 94  Resp:  [17-23] 18  BP: (110-134)/(56-79) 130/72  SpO2:  [90 %-97 %] 92 %  Body mass index is 35 37 kg/m²  Input and Output Summary (last 24 hours): Intake/Output Summary (Last 24 hours) at 2019 1426  Last data filed at 2019 1153  Gross per 24 hour   Intake 4493 75 ml   Output 885 ml   Net 3608 75 ml       Physical Exam:     Physical Exam   Constitutional: She is oriented to person, place, and time  She appears well-developed  She appears ill  No distress  HENT:   Head: Normocephalic  Mouth/Throat: Oropharynx is clear and moist    Neck: Neck supple  Cardiovascular: Normal rate, regular rhythm and normal heart sounds  No murmur heard  Pulmonary/Chest: Effort normal and breath sounds normal  No respiratory distress  She has no wheezes  Abdominal: Soft   Bowel sounds are normal  She exhibits no distension  There is no tenderness  Genitourinary:   Genitourinary Comments: Left flank pain  Musculoskeletal: She exhibits no edema  Neurological: She is alert and oriented to person, place, and time  Skin: Skin is warm and dry  Psychiatric: She has a normal mood and affect  Nursing note and vitals reviewed  Additional Data:   Labs:  Results from last 7 days   Lab Units 09/08/19  0501  09/07/19  1704   WBC Thousand/uL 3 61*  --  5 03   HEMOGLOBIN g/dL 13 3  --  15 3   HEMATOCRIT % 42 1  --  47 7*   PLATELETS Thousands/uL 90*   < > 115*   LYMPHO PCT %  --   --  29   MONO PCT %  --   --  9   EOS PCT %  --   --  2    < > = values in this interval not displayed  Results from last 7 days   Lab Units 09/08/19  0501   POTASSIUM mmol/L 3 6   CHLORIDE mmol/L 100   CO2 mmol/L 27   BUN mg/dL 8   CREATININE mg/dL 0 94   CALCIUM mg/dL 8 2*   ALK PHOS U/L 195*   ALT U/L 49   AST U/L 58*     Results from last 7 days   Lab Units 09/07/19  1704   INR  1 02       * I Have Reviewed All Lab Data Listed Above  * Additional Pertinent Lab Tests Reviewed: All Labs Within Last 24 Hours Reviewed    Imaging:  Imaging Reports Reviewed Today Include: no new imaging to review          Recent Cultures (last 7 days):         Last 24 Hours Medication List:     Current Facility-Administered Medications:  acetaminophen 650 mg Oral Q6H PRN Jd Mireles PA-C    aztreonam 1,000 mg Intravenous Q8H Debbie Bhardwaj MD    enoxaparin 40 mg Subcutaneous Daily Jd Mireles PA-C    HYDROmorphone 0 5 mg Intravenous Q4H PRN Víctor Fraga PA-C    levofloxacin 500 mg Intravenous Q24H Erickson Arndt PA-C    ondansetron 4 mg Intravenous Q6H PRN Jd Mireles PA-C    sodium chloride 75 mL/hr Intravenous Continuous Erickson Arndt PA-C Last Rate: 75 mL/hr (09/08/19 1153)        Today, Patient Was Seen By: Víctor Fraga PA-C    ** Please Note: Dragon 360 Dictation voice to text software may have been used in the creation of this document   **

## 2019-09-08 NOTE — ASSESSMENT & PLAN NOTE
CT abdomen and pelvis on admission :   IMPRESSION:  1  No urinary tract calculi  2   Bilateral hydronephrosis, mild on the right and moderate on the left, developing since a sonogram from 10/7/2016  Will follow urinary retention protocol  Check post void residuals  Strain all urine - no radiopaque stones noted on CT scan but new hydronephrosis with flank pain and UTI will need close monitoring for possible stone  Consult urology    Consider repeat renal ultrasound - inpatient vs  Outpatient - to ensure improvement

## 2019-09-08 NOTE — PLAN OF CARE
Problem: PAIN - ADULT  Goal: Verbalizes/displays adequate comfort level or baseline comfort level  Description  Interventions:  - Encourage patient to monitor pain and request assistance  - Assess pain using appropriate pain scale; 0-10 pain scale  - Administer analgesics based on type and severity of pain and evaluate response  - Implement non-pharmacological measures as appropriate and evaluate response  - Consider cultural and social influences on pain and pain management  - Notify physician/advanced practitioner if interventions unsuccessful or patient reports new pain   Outcome: Progressing     Problem: INFECTION - ADULT  Goal: Absence or prevention of progression during hospitalization  Description  INTERVENTIONS:  - Assess and monitor for signs and symptoms of infection  - Monitor lab/diagnostic results  - Monitor all insertion sites, i e  indwelling lines, tubes, and drains  - Wren appropriate cooling/warming therapies per order  - Administer medications as ordered  - Instruct and encourage patient and family to use good hand hygiene technique   Outcome: Progressing     Problem: SAFETY ADULT  Goal: Patient will remain free of falls  Description  INTERVENTIONS:  - Assess patient frequently for physical needs  -  Identify cognitive and physical deficits and behaviors that affect risk of falls  -  Wren fall precautions as indicated by assessment   Low fall risk   - Educate patient/family on patient safety including physical limitations  - Instruct patient to call for assistance with activity based on assessment  - Modify environment to reduce risk of injury  - Consider OT/PT consult to assist with strengthening/mobility   Outcome: Progressing  Goal: Maintain or return mobility status to optimal level  Description  INTERVENTIONS:  - Assess patient's baseline mobility status -[facility] fall precautions as indicated by assessment -Low fall risk   - Record patient progress and toleration of activity level on Mobility SBAR; progress patient to next Phase/Stage  - Instruct patient to call for assistance with activity based on assessment  - Consider rehabilitation consult to assist with strengthening/weightbearing, etc     Outcome: Progressing  Goal: Maintain or return to baseline ADL function  Description  INTERVENTIONS:  -  Assess patient's ability to carry out ADLs; Independent  - Assess patient's mobility; Independent  - Encourage maximum independence but intervene and supervise when necessary  - Involve family in performance of ADLs  - Assess for home care needs following discharge   - Consider OT consult to assist with ADL evaluation and planning for discharge  - Provide patient education as appropriate   Outcome: Progressing     Problem: DISCHARGE PLANNING  Goal: Discharge to home or other facility with appropriate resources  Description  INTERVENTIONS:  - Identify barriers to discharge w/patient and caregiver  - Arrange for needed discharge resources and transportation as appropriate  - Identify discharge learning needs (meds, wound care, etc )  - Refer to Case Management Department for coordinating discharge planning if the patient needs post-hospital services based on physician/advanced practitioner order or complex needs related to functional status, cognitive ability, or social support system   Outcome: Progressing     Problem: Knowledge Deficit  Goal: Patient/family/caregiver demonstrates understanding of disease process, treatment plan, medications, and discharge instructions  Description  Complete learning assessment and assess knowledge base    Interventions:  - Provide teaching at level of understanding  - Provide teaching via preferred learning methods  Outcome: Progressing     Problem: GENITOURINARY - ADULT  Goal: Maintains or returns to baseline urinary function  Description  INTERVENTIONS:  - Assess urinary function  - Encourage oral fluids to ensure adequate hydration if ordered  - Administer IV fluids as ordered to ensure adequate hydration  - Administer ordered medications as needed  - Offer frequent toileting  - Follow urinary retention protocol - post void residuals every shift   Outcome: Progressing     Problem: METABOLIC, FLUID AND ELECTROLYTES - ADULT  Goal: Electrolytes maintained within normal limits  Description  INTERVENTIONS:  - Monitor labs and assess patient for signs and symptoms of electrolyte imbalances  - Administer electrolyte replacement as ordered  - Monitor response to electrolyte replacements, including repeat lab results as appropriate  - Instruct patient on fluid and nutrition as appropriate  Outcome: Progressing

## 2019-09-08 NOTE — ED PROVIDER NOTES
Patient signed out to me by Dr Brittany Whitlock  Patient is a 77-year-old female coming in today with sepsis  Sepsis alert was started on protocol initiated  Sign out was pending CTs  CTs are negative for PE or acute pathology  There is atelectasis and hydronephrosis  Otherwise stable  I did discuss this with the slim PA on call  He wishes for med surge without any tele  Portions of the record may have been created with voice recognition software  Occasional wrong word or "sound a like" substitutions may have occurred due to the inherent limitations of voice recognition software  Read the chart carefully and recognize, using context, where substitutions have occurred         Rodrigo Frost DO  09/07/19 2052

## 2019-09-08 NOTE — ASSESSMENT & PLAN NOTE
A sodium level at 132  Most likely hypovolemic hyponatremia, Low PO intake  IV normal saline  Monitor sodium levels  Sodium urine random, a urine osmolality

## 2019-09-08 NOTE — ASSESSMENT & PLAN NOTE
Likely in the setting of acute infection / sepsis  D-dimer level at 5,386   CT pe study negative for acute PE  Venous duplex study of the BLE negative for DVT

## 2019-09-08 NOTE — H&P
[facility] Internal Medicine  H&P- Raghu Kapoor 1965, 52 y o  female MRN: [id number]    Unit/Bed#: 410-01 Encounter: [id number]    Primary Care Provider: Marinus Eisenmenger, DO   Date and time admitted to hospital: 9/7/2019  4:45 PM        * Sepsis secondary to UTI [facility])  Assessment & Plan  Present on admission  Fever, tachycardia  Secondary to urinary tract infection  Sepsis protocol initiated in the ER  Receive IV fluid resuscitation  Started on IV Levaquin  Admit to [facility]  Continue sepsis pathway   Blood cultures pending, urine cultures pending  Check procalcitonin  Continue IV Levaquin for now  Monitor blood pressure closely  A m  Labs  Supportive care      Acute cystitis without hematuria  Assessment & Plan  Present to the ER with complaints of fever X 3 days  She reports that she had episode of back pain 2 days prior to onset of fever  UA results consistent with UTI  He is febrile upon arrival to the ER  Started on IV Levaquin  Blood cultures pending  Continue IV Levaquin  Monitor I/O  AM labs  Supportive care    Hyponatremia  Assessment & Plan  A sodium level at 132  Most likely hypovolemic hyponatremia, Low PO intake  IV normal saline  Monitor sodium levels  Sodium urine random, a urine osmolality    Elevated d-dimer  Assessment & Plan  D-dimer level at 5,386   No hx of DVT/PE  CT pe study negative for acute PE  Will check VAS lower limb Venous duplex study  VTE prophylaxis Lovenox 40 mg subQ daily        VTE Prophylaxis: Enoxaparin (Lovenox)  / sequential compression device   Code Status: level 1- Full Code  POLST: POLST is not applicable to this patient  Discussion with family:   at bedside    Anticipated Length of Stay:  Patient will be admitted on an Inpatient basis with an anticipated length of stay of  > 2 midnights     Justification for Hospital Stay:  Sepsis present on admission secondary to UTI, hyponatremia, elevated D-dimer, acute cystitis    Total Time for Visit, including Counseling / Coordination of Care: 30 minutes  Greater than 50% of this total time spent on direct patient counseling and coordination of care  Chief Complaint:   Fever, urinary frequency    History of Present Illness:    Chanda Duran is a 52 y o  female who presents to the emergency room with complaints of fever over the last 3-4 days getting progressively worse  She reports that prior to onset of fever she did have episodes of lower back pain  She has been taking Tylenol with some relief of the fever however it will return a few hours thereafter  She has no significant past medical history not currently on any medications  She reports that she has had urinary tract infections in the past with no symptoms  Upon arrival to the emergency room sepsis alert was called for elevated temp tachycardia with a urinary tract infection as source  Labs completed in emergency room with results as shown below  Chest x-ray completed with results as shown below  CT renal stone study abdomen and pelvis with contrast completed with results as shown below  D-dimer was significantly elevated as CT chest PE study is negative for acute pulmonary embolism  She was started on IV Levaquin, and received IV fluid resuscitation with lactated Ringer's 2 L  She also received Tylenol 650 mg p o  At bedside she is awake and alert she states that she feels much better than when she arrived in emergency room  Patient has been admitted on inpatient status [facility] level care for further workup and management of sepsis present on admission secondary to urinary tract infection, elevated D-dimer, hyponatremia,    Review of Systems:    Review of Systems   Constitutional: Positive for appetite change, chills, fatigue and fever  HENT: Negative for tinnitus, trouble swallowing and voice change  Eyes: Negative for photophobia, pain, redness and visual disturbance     Respiratory: Negative for cough, chest tightness, shortness of breath and wheezing  Cardiovascular: Negative for chest pain, palpitations and leg swelling  Gastrointestinal: Positive for nausea  Negative for abdominal pain, diarrhea and vomiting  Endocrine: Negative for polydipsia, polyphagia and polyuria  Genitourinary: Negative for difficulty urinating, dysuria, flank pain, frequency and hematuria  Musculoskeletal: Positive for back pain  Negative for arthralgias, gait problem and joint swelling  Skin: Negative for color change, pallor and rash  Neurological: Positive for headaches  Negative for dizziness, weakness and light-headedness  Psychiatric/Behavioral: Negative for agitation and sleep disturbance  The patient is not nervous/anxious  Past Medical and Surgical History:     History reviewed  No pertinent past medical history  Past Surgical History:   Procedure Laterality Date    APPENDECTOMY       SECTION      HYSTERECTOMY             Meds/Allergies:    Prior to Admission medications    Not on File     I have reviewed home medications with patient personally  Allergies:    Allergies   Allergen Reactions    Amoxicillin-Pot Clavulanate Hives, Itching, Wheezing and Facial Swelling    Bee Venom Anaphylaxis    Celecoxib Rash       Social History:     Marital Status: /Civil Union   Occupation:    Patient Pre-hospital Living Situation:  Lives with   Patient Pre-hospital Level of Mobility:  Active  Patient Pre-hospital Diet Restrictions:  None reported  Substance Use History:   Social History     Substance and Sexual Activity   Alcohol Use Yes    Comment: occassional     Social History     Tobacco Use   Smoking Status Never Smoker   Smokeless Tobacco Never Used     Social History     Substance and Sexual Activity   Drug Use Never       Family History:    Family History   Problem Relation Age of Onset    No Known Problems Mother     No Known Problems Sister     No Known Problems Sister     No Known Problems Maternal Aunt     No Known Problems Maternal Aunt     No Known Problems Paternal Aunt     Cancer Paternal Aunt     No Known Problems Paternal Aunt        Physical Exam:     Vitals:   Blood Pressure: 124/78 (09/07/19 2125)  Pulse: 83 (09/07/19 2125)  Temperature: 98 3 °F (36 8 °C) (09/07/19 2125)  Temp Source: Oral (09/07/19 2125)  Respirations: 17 (09/07/19 2125)  Height: 5' 8" (172 7 cm) (09/07/19 2125)  Weight - Scale: 106 kg (233 lb 0 4 oz) (09/07/19 2125)  SpO2: 97 % (09/07/19 2125)    Physical Exam   Constitutional: She is oriented to person, place, and time  No distress  HENT:   Head: Normocephalic and atraumatic  Mouth/Throat: Oropharynx is clear and moist    Eyes: Pupils are equal, round, and reactive to light  EOM are normal  Right eye exhibits no discharge  Left eye exhibits no discharge  No scleral icterus  Neck: Normal range of motion  Neck supple  No JVD present  Cardiovascular: Normal rate, regular rhythm and intact distal pulses  Pulmonary/Chest: Effort normal and breath sounds normal  No stridor  No respiratory distress  She has no wheezes  She has no rales  Abdominal: Soft  Bowel sounds are normal  She exhibits no mass  There is tenderness  There is no guarding  Lower quadrant tenderness   Musculoskeletal: Normal range of motion  She exhibits no edema, tenderness or deformity  Neurological: She is oriented to person, place, and time  Skin: Skin is warm and dry  Capillary refill takes less than 2 seconds  No rash noted  She is not diaphoretic  No erythema  No pallor  Psychiatric: She has a normal mood and affect  Vitals reviewed  Additional Data:     Lab Results: I have personally reviewed pertinent reports        Results from last 7 days   Lab Units 09/07/19  1704   WBC Thousand/uL 5 03   HEMOGLOBIN g/dL 15 3   HEMATOCRIT % 47 7*   PLATELETS Thousands/uL 115*   LYMPHO PCT % 29   MONO PCT % 9   EOS PCT % 2     Results from last 7 days   Lab Units 09/07/19  1704 SODIUM mmol/L 132*   POTASSIUM mmol/L 3 8   CHLORIDE mmol/L 96*   CO2 mmol/L 24   BUN mg/dL 10   CREATININE mg/dL 1 12   ANION GAP mmol/L 12   CALCIUM mg/dL 8 7   ALBUMIN g/dL 3 5   TOTAL BILIRUBIN mg/dL 2 00*   ALK PHOS U/L 222*   ALT U/L 63   AST U/L 69*   GLUCOSE RANDOM mg/dL 142*     Results from last 7 days   Lab Units 09/07/19  1704   INR  1 02             Results from last 7 days   Lab Units 09/07/19  1922 09/07/19  1704   LACTIC ACID mmol/L 1 1 1 2       Imaging: I have personally reviewed pertinent reports  CT renal stone study abdomen pelvis wo contrast   Final Result by Guille Brush MD (09/07 2028)      1  No urinary tract calculi  2   Bilateral hydronephrosis, mild on the right and moderate on the left, developing since a sonogram from 10/7/2016  Workstation performed: [id number]         CTA ed chest pe study   Final Result by Guille Brush MD (09/07 2040)      1  No evidence of pulmonary embolus  2   Subsegmental atelectasis in the right middle lobe and lingula  3   Mild mediastinal and hilar lymphadenopathy  Workstation performed: [id number]         XR chest pa & lateral   Final Result by Yen Billingsley MD (09/07 1821)      No active pulmonary disease  Workstation performed: [id number]         VAS lower limb venous duplex study, complete bilateral    (Results Pending)       EKG, Pathology, and Other Studies Reviewed on Admission:   · EKG:  A normal sinus rhythm at a rate of 99 beats per minute    Allscripts / Epic Records Reviewed: Yes     ** Please Note: This note has been constructed using a voice recognition system   **

## 2019-09-08 NOTE — ASSESSMENT & PLAN NOTE
UA results consistent with UTI  Hydronephrosis, persistent fever, and flank pain today  Probable early pyelonephritis  He is febrile upon arrival to the ER  Started on IV Levaquin - will add IV aztreonam for broader coverage given persistent fever today  Of note, cefepime was considered but avoided due to significant allergy to penicillin    Blood and urine cultures pending

## 2019-09-08 NOTE — ASSESSMENT & PLAN NOTE
Present to the ER with complaints of fever X 3 days  She reports that she had episode of back pain 2 days prior to onset of fever  UA results consistent with UTI  He is febrile upon arrival to the ER  Started on IV Levaquin  Blood cultures pending  Continue IV Levaquin  Monitor I/O  AM labs  Supportive care

## 2019-09-08 NOTE — ASSESSMENT & PLAN NOTE
D-dimer level at 5,386   No hx of DVT/PE  CT pe study negative for acute PE  Will check VAS lower limb Venous duplex study  VTE prophylaxis Lovenox 40 mg subQ daily

## 2019-09-08 NOTE — ASSESSMENT & PLAN NOTE
Present on admission  Fever, tachycardia  Secondary to urinary tract infection  Sepsis protocol initiated in the ER  Receive IV fluid resuscitation  Started on IV Levaquin  Admit to [facility]  Continue sepsis pathway   Blood cultures pending, urine cultures pending  Check procalcitonin  Continue IV Levaquin for now  Monitor blood pressure closely  A m   Labs  Supportive care

## 2019-09-08 NOTE — PHYSICAL THERAPY NOTE
PT EVALUATION      09/08/19 0905   Note Type   Note type Eval only   Pain Assessment   Pain Assessment No/denies pain   Pain Score No Pain   Home Living   Type of Home House   Home Layout Stairs to enter with rails  (Bi-Level, 2 JACKELYN, 13 steps with landing)   Bathroom Shower/Tub Tub/shower unit   Bathroom Toilet Raised   Bathroom Accessibility Accessible   Home Equipment   (Has DME from [employer] but does not use )   Prior Function   Level of Leon Independent with ADLs and functional mobility   Lives With Spouse   ADL Assistance Independent   IADLs Independent   Vocational Full time employment   Comments (+) drives   General   Family/Caregiver Present Yes   Cognition   Overall Cognitive Status WFL   Arousal/Participation Alert   Orientation Level Oriented X4   Memory Within functional limits   Following Commands Follows all commands and directions without difficulty   RLE Assessment   RLE Assessment WNL   LLE Assessment   LLE Assessment WNL   Light Touch   RLE Light Touch Grossly intact   LLE Light Touch Grossly intact   Bed Mobility   Supine to Sit 7  Independent   Sit to Supine 7  Independent   Transfers   Sit to Stand 7  Independent   Stand to Sit 7  Independent   Ambulation/Elevation   Gait pattern WNL   Gait Assistance 7  Independent   Assistive Device None   Distance 250 feet   Balance   Static Sitting Normal   Dynamic Sitting Normal   Static Standing Normal   Dynamic Standing Normal   Ambulatory Normal   Activity Tolerance   Activity Tolerance Patient tolerated treatment well   Assessment   Prognosis Good   Assessment The patient is a 53 y/o female who was admitted with diagnosis of sepsis secondary to UTI  She has no complaints of pain at IE  Her PMH includes elevated D-dimer, hyponatremia and acute cystitis without hematuria  She is I with bed mobility and transfers  She was able to ambulate 250 feet without AD with no LOB noted    She returned back to bed at end of session, HOB elevated, SCDs on and call bell within reach  Will D/C PT at this time secondary to skilled PT services not indicated  She will be safe to return home when medically stable  D/C PT  Goals   LTG Expiration Date 09/22/19   Long Term Goal #1 No goals set secondary to skilled PT services not indicated  Plan   Treatment/Interventions   (N/A)   PT Frequency   (N/A)   Recommendation   PT - OK to Discharge Yes   Patient back in bed at end of session, HOB elevated, SCDs on and call bell within reach  Skilled PT services not indicated, D/C PT

## 2019-09-08 NOTE — OCCUPATIONAL THERAPY NOTE
Occupational Therapy Screen        Patient Name: Sveta Mansfield  BJCRF'L Date: 9/8/2019        Patient currently ambulating to and from bathroom independently and is completing ADLs at baseline level (I) per observation and patient/nursing report  No need for OT services at this time due to level of independence       Shane Leggett, OT

## 2019-09-08 NOTE — ASSESSMENT & PLAN NOTE
Present on admission with and Fever, tachycardia in the setting of abnormal urinalysis suggestive of UTI, and exam and CT findings suggestive of probable early pyelonephritis  Sepsis protocol initiated in the ER including IV fluid hydration and IV Levaquin  Will continue IV levaquin but add IV aztreonam for broader coverage given persistent fever today  Note cefepime was considered but avoided due to significant allergy to penicillin  Blood cultures pending, urine cultures pending  Procalcitonin pending

## 2019-09-08 NOTE — PLAN OF CARE
Problem: PHYSICAL THERAPY ADULT  Goal: Performs mobility at highest level of function for planned discharge setting  See evaluation for individualized goals  Description  Treatment/Interventions: (N/A)          See flowsheet documentation for full assessment, interventions and recommendations  Note:   Prognosis: Good     Assessment: The patient is a 51 y/o female who was admitted with diagnosis of sepsis secondary to UTI  She has no complaints of pain at IE  Her PMH includes elevated D-dimer, hyponatremia and acute cystitis without hematuria  She is I with bed mobility and transfers  She was able to ambulate 250 feet without AD with no LOB noted  She returned back to bed at end of session, HOB elevated, SCDs on and call bell within reach  Will D/C PT at this time secondary to skilled PT services not indicated  She will be safe to return home when medically stable  D/C PT                PT - OK to Discharge: Yes    See flowsheet documentation for full assessment

## 2019-09-08 NOTE — ASSESSMENT & PLAN NOTE
A sodium level at 132 on admission , improved to 134 today  Most likely hypovolemic hyponatremia, Low PO intake  Continue IV normal saline, but will reduce rate to 75mL/hr

## 2019-09-08 NOTE — PLAN OF CARE
Problem: PAIN - ADULT  Goal: Verbalizes/displays adequate comfort level or baseline comfort level  Description  Interventions:  - Encourage patient to monitor pain and request assistance  - Assess pain using appropriate pain scale  - Administer analgesics based on type and severity of pain and evaluate response  - Implement non-pharmacological measures as appropriate and evaluate response  - Consider cultural and social influences on pain and pain management  - Notify physician/advanced practitioner if interventions unsuccessful or patient reports new pain  Outcome: Progressing     Problem: INFECTION - ADULT  Goal: Absence or prevention of progression during hospitalization  Description  INTERVENTIONS:  - Assess and monitor for signs and symptoms of infection  - Monitor lab/diagnostic results  - Monitor all insertion sites, i e  indwelling lines, tubes, and drains  - [place] appropriate cooling/warming therapies per order  - Administer medications as ordered  - Instruct and encourage patient and family to use good hand hygiene technique  - Identify and instruct in appropriate isolation precautions for identified infection/condition   Outcome: Progressing     Problem: SAFETY ADULT  Goal: Patient will remain free of falls  Description  INTERVENTIONS:  - Assess patient frequently for physical needs  -  Identify cognitive and physical deficits and behaviors that affect risk of falls    -  [place] fall precautions as indicated by assessment   - Educate patient/family on patient safety including physical limitations  - Instruct patient to call for assistance with activity based on assessment  - Modify environment to reduce risk of injury  - Consider OT/PT consult to assist with strengthening/mobility  Outcome: Progressing  Goal: Maintain or return mobility status to optimal level  Description  INTERVENTIONS:  - Assess patient's baseline mobility status (ambulation, transfers, stairs, etc )    - [place] fall precautions as indicated by assessment  - Record patient progress and toleration of activity level on Mobility SBAR; progress patient to next Phase/Stage  - Instruct patient to call for assistance with activity based on assessment  - Consider rehabilitation consult to assist with strengthening/weightbearing, etc    Outcome: Progressing     Problem: DISCHARGE PLANNING  Goal: Discharge to home or other facility with appropriate resources  Description  INTERVENTIONS:  - Identify barriers to discharge w/patient and caregiver  - Arrange for needed discharge resources and transportation as appropriate  - Identify discharge learning needs (meds, wound care, etc )  - Arrange for interpretive services to assist at discharge as needed  - Refer to Case Management Department for coordinating discharge planning if the patient needs post-hospital services based on physician/advanced practitioner order or complex needs related to functional status, cognitive ability, or social support system  Outcome: Progressing     Problem: Knowledge Deficit  Goal: Patient/family/caregiver demonstrates understanding of disease process, treatment plan, medications, and discharge instructions  Description  Complete learning assessment and assess knowledge base    Interventions:  - Provide teaching at level of understanding  - Provide teaching via preferred learning methods  Outcome: Progressing     Problem: GENITOURINARY - ADULT  Goal: Maintains or returns to baseline urinary function  Description  INTERVENTIONS:  - Assess urinary function  - Encourage oral fluids to ensure adequate hydration if ordered  - Administer IV fluids as ordered to ensure adequate hydration  - Administer ordered medications as needed  - Offer frequent toileting  - Follow urinary retention protocol if ordered  Outcome: Progressing     Problem: METABOLIC, FLUID AND ELECTROLYTES - ADULT  Goal: Electrolytes maintained within normal limits  Description  INTERVENTIONS:  - Monitor labs and assess patient for signs and symptoms of electrolyte imbalances  - Administer electrolyte replacement as ordered  - Monitor response to electrolyte replacements, including repeat lab results as appropriate  - Instruct patient on fluid and nutrition as appropriate  Outcome: Progressing

## 2019-09-08 NOTE — UTILIZATION REVIEW
Initial Clinical Review    Admission: Date/Time/Statement: Inpatient Admission Orders (From admission, onward)     Ordered        09/07/19 2052  Inpatient Admission (expected length of stay for this patient Order details is greater than two midnights)  Once                   Orders Placed This Encounter   Procedures    Inpatient Admission (expected length of stay for this patient Order details is greater than two midnights)     Standing Status:   Standing     Number of Occurrences:   1     Order Specific Question:   Admitting Physician     Answer:   Evonne Arita [[id number]]     Order Specific Question:   Level of Care     Answer:   Med Surg [[id number]]     Order Specific Question:   Estimated length of stay     Answer:   More than 2 Midnights     Order Specific Question:   Certification     Answer:   I certify that inpatient services are medically necessary for this patient for a duration of greater than two midnights  See H&P and MD Progress Notes for additional information about the patient's course of treatment  ED Arrival Information     Expected Arrival Acuity Means of Arrival Escorted By Service Admission Type    - 9/7/2019 16:42 Emergent [facility] Medicine Emergency    Arrival Complaint    Fever         Chief Complaint   Patient presents with    Fever - 9 weeks to 74 years     fever for 4 days and urinary frequency     Assessment/Plan: 53 yo f  No significant PMH, presents with complaints of fever over the last 3-4 days progressively worsening  She is admitted inpatient  For sepsis with an elevated temp, tachycardia and a urinary tract infection as source  She was started on Levaquin and IV fluid resuscitation  LR  1 L   She is admitted inpatient for a diagnosis of sepsis           ED Triage Vitals [09/07/19 1649]   Temperature Pulse Respirations Blood Pressure SpO2   (!) 100 9 °F (38 3 °C) (!) 108 20 127/79 96 %      Temp Source Heart Rate Source Patient Position - Orthostatic VS BP Location FiO2 (%)   Oral Right Sitting Right arm --      Pain Score       8        Wt Readings from Last 1 Encounters:   09/08/19 106 kg (232 lb 9 6 oz)     Additional Vital Signs:   Date/Time  Temp  Pulse  Resp  BP  MAP (mmHg)  SpO2  O2 Device  Patient Position - Orthostatic VS   09/08/19 1400  98 3 °F (36 8 °C)                 09/08/19 1255  101 7 °F (38 7 °C)Abnormal                  09/08/19 12:12:27  103 3 °F (39 6 °C)Abnormal   94    130/72  91  92 %       09/08/19 09:34:11  98 8 °F (37 1 °C)  81        96 %       09/08/19 09:10:18  98 9 °F (37 2 °C)  80        95 %       09/08/19 07:08:54  100 8 °F (38 2 °C)Abnormal   85  18  117/58  78  93 %       09/08/19 04:41:32  97 8 °F (36 6 °C)  76        95 %       09/08/19 03:02:59  98 1 °F (36 7 °C)  75        96 %       09/08/19 01:32:35  99 5 °F (37 5 °C)  76        94 %       09/08/19 0027  99 2 °F (37 3 °C)                 09/07/19 23:18:35  99 5 °F (37 5 °C)  91  23Abnormal   119/59  79  95 %  None (Room air)  Lying   09/07/19 2220              None (Room air)     09/07/19 21:25:15  98 3 °F (36 8 °C)  83  17  124/78  93  97 %  None (Room air)  Lying   09/07/19 2108  100 3 °F (37 9 °C)  83  20  120/56    95 %  None (Room air)     09/07/19 1900    84  20  116/62    95 %  None (Room air)  Sitting   09/07/19 1845  103 1 °F (39 5 °C)Abnormal   88  20  122/58    92 %  None (Room air)  Sitting   09/07/19 1830    87  20  130/62    93 %  None (Room air)  Sitting         Pertinent Labs/Diagnostic Test Results:   Results from last 7 days   Lab Units 09/08/19  0501 09/07/19  2151 09/07/19  1704   WBC Thousand/uL 3 61*  --  5 03   HEMOGLOBIN g/dL 13 3  --  15 3   HEMATOCRIT % 42 1  --  47 7*   PLATELETS Thousands/uL 90* 96* 115*     Results from last 7 days   Lab Units 09/08/19  0501 09/07/19  1704   SODIUM mmol/L 134* 132*   POTASSIUM mmol/L 3 6 3 8   CHLORIDE mmol/L 100 96*   CO2 mmol/L 27 24   ANION GAP mmol/L 7 12 BUN mg/dL 8 10   CREATININE mg/dL 0 94 1 12   EGFR ml/min/1 73sq m 69 55   CALCIUM mg/dL 8 2* 8 7   MAGNESIUM mg/dL 1 9  --    PHOSPHORUS mg/dL 3 1  --      Results from last 7 days   Lab Units 09/08/19  0501 09/07/19  1704   AST U/L 58* 69*   ALT U/L 49 63   ALK PHOS U/L 195* 222*   TOTAL PROTEIN g/dL 6 5 7 9   ALBUMIN g/dL 2 8* 3 5   TOTAL BILIRUBIN mg/dL 2 60* 2 00*     Results from last 7 days   Lab Units 09/08/19  0501 09/07/19  1704   GLUCOSE RANDOM mg/dL 96 142*       Results from last 7 days   Lab Units 09/07/19  1704   TROPONIN I ng/mL <0 02     Results from last 7 days   Lab Units 09/07/19  1841   D DIMER QUANT ng/ml (FEU) 5,386*     Results from last 7 days   Lab Units 09/07/19  1704   PROTIME seconds 13 4   INR  1 02   PTT seconds 30       Results from last 7 days   Lab Units 09/07/19  1922 09/07/19  1704   LACTIC ACID mmol/L 1 1 1 2       Results from last 7 days   Lab Units 09/07/19  1711   CLARITY UA  Clear   COLOR UA  Didi   SPEC GRAV UA  >=1 030   PH UA  5 5   GLUCOSE UA mg/dl Negative   KETONES UA mg/dl 15 (1+)*   BLOOD UA  Negative   PROTEIN UA mg/dl 100 (2+)*   NITRITE UA  Positive*   BILIRUBIN UA  Interference- unable to analyze*   UROBILINOGEN UA E U /dl 4 0*   LEUKOCYTES UA  Trace*   WBC UA /hpf 2-4*   RBC UA /hpf 1-2*   BACTERIA UA /hpf Innumerable*   EPITHELIAL CELLS WET PREP /hpf Occasional     Results from last 7 days   Lab Units 09/07/19  1704   TOTAL COUNTED  100     Blood Cultures - Pending    CT ABD pelvis - 9/7 -   1   No urinary tract calculi  2   Bilateral hydronephrosis, mild on the right and moderate on the left, developing since a sonogram from 10/7/2016  CTA Chest  - 9/7 - 1   No evidence of pulmonary embolus  2   Subsegmental atelectasis in the right middle lobe and lingula  3   Mild mediastinal and hilar lymphadenopathy      CXR 9/7 - no acute   EKG 0  Sinus rhythm -     ED Treatment:   Medication Administration from 09/07/2019 1642 to 09/07/2019 9283       Date/Time Order Dose Route Action     09/07/2019 1712 lactated ringers bolus 1,000 mL 1,000 mL Intravenous New Bag     09/07/2019 1732 lactated ringers bolus 1,000 mL 1,000 mL Intravenous New Bag     09/07/2019 1712 levofloxacin (LEVAQUIN) IVPB (premix) 750 mg 750 mg Intravenous New Bag     09/07/2019 1706 acetaminophen (TYLENOL) tablet 650 mg 650 mg Oral Given     09/07/2019 1924 lactated ringers bolus 1,000 mL 1,000 mL Intravenous New Bag        History reviewed  No pertinent past medical history  Present on Admission:   Sepsis secondary to UTI ([facility] [address] )   Elevated d-dimer   Hyponatremia   Acute cystitis without hematuria   Hydroureteronephrosis      Admitting Diagnosis: UTI (urinary tract infection) [N39 0]  Fever [R50 9]  Fever in adult [R50 9]  Sepsis ([facility] [address] ) [A41 9]  Age/Sex: 52 y o  female  Admission Orders:    Current Facility-Administered Medications:  acetaminophen 650 mg Oral Q6H PRN    enoxaparin 40 mg Subcutaneous Daily    HYDROmorphone 0 5 mg Intravenous Q4H PRN    levofloxacin 500 mg Intravenous Q24H    ondansetron 4 mg Intravenous Q6H PRN    sodium chloride 75 mL/hr Intravenous Continuous        Nursing orders -  VS q 4 -  I & O q shift - Urinary retention protocol - Strain all urine - Daily weights - SCD's to le's- Up and OOB as tolerated - PT eval     Network Utilization Review Department  Phone: [phone]; Fax [phone]  [email or web address]  ATTENTION: Please call with any questions or concerns to [phone]  and carefully listen to the prompts so that you are directed to the right person  Send all requests for admission clinical reviews, approved or denied determinations and any other requests to fax [phone]   All voicemails are confidential

## 2019-09-09 LAB
ANION GAP SERPL CALCULATED.3IONS-SCNC: 11 MMOL/L (ref 4–13)
BACTERIA UR CULT: NORMAL
BASOPHILS # BLD AUTO: 0.02 THOUSANDS/ΜL (ref 0–0.1)
BASOPHILS NFR BLD AUTO: 1 % (ref 0–1)
BUN SERPL-MCNC: 9 MG/DL (ref 5–25)
CALCIUM SERPL-MCNC: 7.9 MG/DL (ref 8.3–10.1)
CHLORIDE SERPL-SCNC: 97 MMOL/L (ref 100–108)
CO2 SERPL-SCNC: 23 MMOL/L (ref 21–32)
CREAT SERPL-MCNC: 0.92 MG/DL (ref 0.6–1.3)
EOSINOPHIL # BLD AUTO: 0.01 THOUSAND/ΜL (ref 0–0.61)
EOSINOPHIL NFR BLD AUTO: 0 % (ref 0–6)
ERYTHROCYTE [DISTWIDTH] IN BLOOD BY AUTOMATED COUNT: 15.1 % (ref 11.6–15.1)
GFR SERPL CREATININE-BSD FRML MDRD: 70 ML/MIN/1.73SQ M
GLUCOSE SERPL-MCNC: 128 MG/DL (ref 65–140)
HCT VFR BLD AUTO: 38.9 % (ref 34.8–46.1)
HGB BLD-MCNC: 12.5 G/DL (ref 11.5–15.4)
IMM GRANULOCYTES # BLD AUTO: 0.04 THOUSAND/UL (ref 0–0.2)
IMM GRANULOCYTES NFR BLD AUTO: 1 % (ref 0–2)
LYMPHOCYTES # BLD AUTO: 1.51 THOUSANDS/ΜL (ref 0.6–4.47)
LYMPHOCYTES NFR BLD AUTO: 37 % (ref 14–44)
MCH RBC QN AUTO: 27.7 PG (ref 26.8–34.3)
MCHC RBC AUTO-ENTMCNC: 32.1 G/DL (ref 31.4–37.4)
MCV RBC AUTO: 86 FL (ref 82–98)
MONOCYTES # BLD AUTO: 0.24 THOUSAND/ΜL (ref 0.17–1.22)
MONOCYTES NFR BLD AUTO: 6 % (ref 4–12)
NEUTROPHILS # BLD AUTO: 2.28 THOUSANDS/ΜL (ref 1.85–7.62)
NEUTS SEG NFR BLD AUTO: 55 % (ref 43–75)
NRBC BLD AUTO-RTO: 0 /100 WBCS
PLATELET # BLD AUTO: 79 THOUSANDS/UL (ref 149–390)
PMV BLD AUTO: 11.5 FL (ref 8.9–12.7)
POTASSIUM SERPL-SCNC: 3.8 MMOL/L (ref 3.5–5.3)
RBC # BLD AUTO: 4.52 MILLION/UL (ref 3.81–5.12)
SODIUM SERPL-SCNC: 131 MMOL/L (ref 136–145)
WBC # BLD AUTO: 4.1 THOUSAND/UL (ref 4.31–10.16)

## 2019-09-09 PROCEDURE — 80048 BASIC METABOLIC PNL TOTAL CA: CPT | Performed by: PHYSICIAN ASSISTANT

## 2019-09-09 PROCEDURE — 99232 SBSQ HOSP IP/OBS MODERATE 35: CPT | Performed by: PHYSICIAN ASSISTANT

## 2019-09-09 PROCEDURE — 99255 IP/OBS CONSLTJ NEW/EST HI 80: CPT | Performed by: PHYSICIAN ASSISTANT

## 2019-09-09 PROCEDURE — 85025 COMPLETE CBC W/AUTO DIFF WBC: CPT | Performed by: PHYSICIAN ASSISTANT

## 2019-09-09 RX ORDER — CIPROFLOXACIN HYDROCHLORIDE 3.5 MG/ML
1 SOLUTION/ DROPS TOPICAL
Status: DISPENSED | OUTPATIENT
Start: 2019-09-09 | End: 2019-09-11

## 2019-09-09 RX ADMIN — HYDROMORPHONE HYDROCHLORIDE 0.5 MG: 1 INJECTION, SOLUTION INTRAMUSCULAR; INTRAVENOUS; SUBCUTANEOUS at 13:58

## 2019-09-09 RX ADMIN — CIPROFLOXACIN 1 DROP: 3 SOLUTION OPHTHALMIC at 20:03

## 2019-09-09 RX ADMIN — HYDROMORPHONE HYDROCHLORIDE 0.5 MG: 1 INJECTION, SOLUTION INTRAMUSCULAR; INTRAVENOUS; SUBCUTANEOUS at 18:13

## 2019-09-09 RX ADMIN — AZTREONAM 1000 MG: 1 INJECTION, POWDER, LYOPHILIZED, FOR SOLUTION INTRAMUSCULAR; INTRAVENOUS at 07:50

## 2019-09-09 RX ADMIN — SODIUM CHLORIDE 75 ML/HR: 0.9 INJECTION, SOLUTION INTRAVENOUS at 13:17

## 2019-09-09 RX ADMIN — CIPROFLOXACIN 1 DROP: 3 SOLUTION OPHTHALMIC at 18:14

## 2019-09-09 RX ADMIN — LEVOFLOXACIN 500 MG: 5 INJECTION, SOLUTION INTRAVENOUS at 18:14

## 2019-09-09 RX ADMIN — CIPROFLOXACIN 1 DROP: 3 SOLUTION OPHTHALMIC at 21:58

## 2019-09-09 RX ADMIN — ENOXAPARIN SODIUM 40 MG: 40 INJECTION SUBCUTANEOUS at 09:41

## 2019-09-09 RX ADMIN — ACETAMINOPHEN 650 MG: 325 TABLET, FILM COATED ORAL at 19:28

## 2019-09-09 RX ADMIN — ONDANSETRON 4 MG: 2 INJECTION INTRAMUSCULAR; INTRAVENOUS at 21:57

## 2019-09-09 RX ADMIN — HYDROMORPHONE HYDROCHLORIDE 0.5 MG: 1 INJECTION, SOLUTION INTRAMUSCULAR; INTRAVENOUS; SUBCUTANEOUS at 21:54

## 2019-09-09 RX ADMIN — ACETAMINOPHEN 650 MG: 325 TABLET, FILM COATED ORAL at 07:50

## 2019-09-09 NOTE — PLAN OF CARE
Problem: DISCHARGE PLANNING - CARE MANAGEMENT  Goal: Discharge to post-acute care or home with appropriate resources  Description  INTERVENTIONS:  - Conduct assessment to determine patient/family and health care team treatment goals, and need for post-acute services based on payer coverage, community resources, and patient preferences, and barriers to discharge  - Address psychosocial, clinical, and financial barriers to discharge as identified in assessment in conjunction with the patient/family and health care team  - Arrange appropriate level of post-acute services according to patient's   needs and preference and payer coverage in collaboration with the physician and health care team  - Communicate with and update the patient/family, physician, and health care team regarding progress on the discharge plan  - Arrange appropriate transportation to post-acute venues    Pt's goal is to return with spouse   Outcome: Progressing

## 2019-09-09 NOTE — PLAN OF CARE
Problem: PAIN - ADULT  Goal: Verbalizes/displays adequate comfort level or baseline comfort level  Description  Interventions:  - Encourage patient to monitor pain and request assistance  - Assess pain using appropriate pain scale; 0-10 pain scale  - Administer analgesics based on type and severity of pain and evaluate response  - Implement non-pharmacological measures as appropriate and evaluate response  - Consider cultural and social influences on pain and pain management  - Notify physician/advanced practitioner if interventions unsuccessful or patient reports new pain   Outcome: Progressing     Problem: INFECTION - ADULT  Goal: Absence or prevention of progression during hospitalization  Description  INTERVENTIONS:  - Assess and monitor for signs and symptoms of infection  - Monitor lab/diagnostic results  - Monitor all insertion sites, i e  indwelling lines, tubes, and drains  - [place] appropriate cooling/warming therapies per order  - Administer medications as ordered  - Instruct and encourage patient and family to use good hand hygiene technique   Outcome: Progressing     Problem: SAFETY ADULT  Goal: Patient will remain free of falls  Description  INTERVENTIONS:  - Assess patient frequently for physical needs  -  Identify cognitive and physical deficits and behaviors that affect risk of falls  -  [place] fall precautions as indicated by assessment   Low fall risk   - Educate patient/family on patient safety including physical limitations  - Instruct patient to call for assistance with activity based on assessment  - Modify environment to reduce risk of injury  - Consider OT/PT consult to assist with strengthening/mobility   Outcome: Progressing  Goal: Maintain or return mobility status to optimal level  Description  INTERVENTIONS:  - Assess patient's baseline mobility status -[facility] fall precautions as indicated by assessment -Low fall risk   - Record patient progress and toleration of activity level on Mobility SBAR; progress patient to next Phase/Stage  - Instruct patient to call for assistance with activity based on assessment  - Consider rehabilitation consult to assist with strengthening/weightbearing, etc     Outcome: Progressing  Goal: Maintain or return to baseline ADL function  Description  INTERVENTIONS:  -  Assess patient's ability to carry out ADLs; Independent  - Assess patient's mobility; Independent  - Encourage maximum independence but intervene and supervise when necessary  - Involve family in performance of ADLs  - Assess for home care needs following discharge   - Consider OT consult to assist with ADL evaluation and planning for discharge  - Provide patient education as appropriate   Outcome: Progressing     Problem: DISCHARGE PLANNING  Goal: Discharge to home or other facility with appropriate resources  Description  INTERVENTIONS:  - Identify barriers to discharge w/patient and caregiver  - Arrange for needed discharge resources and transportation as appropriate  - Identify discharge learning needs (meds, wound care, etc )  - Refer to Case Management Department for coordinating discharge planning if the patient needs post-hospital services based on physician/advanced practitioner order or complex needs related to functional status, cognitive ability, or social support system   Outcome: Progressing     Problem: Knowledge Deficit  Goal: Patient/family/caregiver demonstrates understanding of disease process, treatment plan, medications, and discharge instructions  Description  Complete learning assessment and assess knowledge base    Interventions:  - Provide teaching at level of understanding  - Provide teaching via preferred learning methods  Outcome: Progressing     Problem: GENITOURINARY - ADULT  Goal: Maintains or returns to baseline urinary function  Description  INTERVENTIONS:  - Assess urinary function  - Encourage oral fluids to ensure adequate hydration if ordered  - Administer IV fluids as ordered to ensure adequate hydration  - Administer ordered medications as needed  - Offer frequent toileting  - Follow urinary retention protocol - post void residuals every shift   Outcome: Progressing     Problem: METABOLIC, FLUID AND ELECTROLYTES - ADULT  Goal: Electrolytes maintained within normal limits  Description  INTERVENTIONS:  - Monitor labs and assess patient for signs and symptoms of electrolyte imbalances  - Administer electrolyte replacement as ordered  - Monitor response to electrolyte replacements, including repeat lab results as appropriate  - Instruct patient on fluid and nutrition as appropriate  Outcome: Progressing     Problem: DISCHARGE PLANNING - CARE MANAGEMENT  Goal: Discharge to post-acute care or home with appropriate resources  Description  INTERVENTIONS:  - Conduct assessment to determine patient/family and health care team treatment goals, and need for post-acute services based on payer coverage, community resources, and patient preferences, and barriers to discharge  - Address psychosocial, clinical, and financial barriers to discharge as identified in assessment in conjunction with the patient/family and health care team  - Arrange appropriate level of post-acute services according to patient's   needs and preference and payer coverage in collaboration with the physician and health care team  - Communicate with and update the patient/family, physician, and health care team regarding progress on the discharge plan  - Arrange appropriate transportation to post-acute venues    Pt's goal is to return with spouse   Outcome: Progressing

## 2019-09-09 NOTE — ASSESSMENT & PLAN NOTE
Present on admission with and Fever, tachycardia in the setting of abnormal urinalysis suggestive of UTI, and exam and CT findings suggestive of probable early pyelonephritis  Sepsis protocol initiated in the ER including IV fluid hydration and IV Levaquin  Will continue IV levaquin and IV aztreonam for broader coverage given persistent fevers yesterday  Note cefepime was considered but avoided due to significant allergy to penicillin    Blood cultures pending, urine cultures pending  Procalcitonin elevated at 0 47

## 2019-09-09 NOTE — ASSESSMENT & PLAN NOTE
A sodium level at 132 on admission , improved to 134 yesterday, now 131 today     Most likely hypovolemic hyponatremia, patient continues to have low PO intake  Continue IV normal saline, but will reduce rate to 75mL/hr

## 2019-09-09 NOTE — CONSULTS
Consultation - Urology   Yani Thompson 52 y o  female MRN: [id number]  Unit/Bed#: 410-01 Encounter: [id number]      Assessment/Plan      Assessment:  Bilateral hydronephrosis, mild on the right and moderate on the left, developing since a sonogram from 10/7/2016  Sepsis d/t UTI  Hyponatremia  Elevated D-dimer  Hx of V-tach in 2017, no issues since      Plan:  IVF hydration  Monitor I/Os  Follow urine culture  Follow blood culture  Trend temperature  Continue antibiotics per primary  Replenish electrolytes as indicated  Medicate PRN pain  Follow AM labs to include CBC, CMP  Recommend cysto retrograde study once infection clears (discussed with patient, this can be done as outpatient)  Follow up with urology as outpatient    History of Present Illness   Attending: Fernando Barker MD  Reason for Consult / Principal Problem: bilateral hydronephrosis  HPI: Ondina Steven is a 52y o  year old female who presents with bilateral hydronephrosis  Patient presented to the [facility] ED on Saturday with history of 4 days of pain in her lower back that did not radiate  It was progressively getting worse  She began feeling feverish 3 days prior to presentation  Workup in the ED revealed the patient to be febrile, septic, hyponatremic,elevated D-Dimer, and bilateral hydronephrosis on imaging  She has since been admitted and was placed on antibiotics by Internal Medicine  She has been afebrile since last evening  She had a Tmax of 103 3 Sunday morning  She states that she was seen by her PCP early last year for routine exam  Per patient, her UA was indicative of UTI at that time but she was not treated due to being asymptomatic  She denies experiencing any hematuria or dysuria  She does state that beginning last Monday her urine was darker than usual and had an odor  Denies CP or SOB        Inpatient consult to Urology  Consult performed by: Tammie Muir PA-C  Consult ordered by: Fernando Barker MD        Review of Systems   Constitutional: Positive for fatigue and fever  HENT: Positive for congestion  Gastrointestinal: Positive for constipation  Baseline 1 BM per week   Genitourinary: Positive for decreased urine volume  Dark malodorous urine x1 week  Musculoskeletal: Positive for back pain  All other systems reviewed and are negative  Historical Information   History reviewed  No pertinent past medical history  Past Surgical History:   Procedure Laterality Date    APPENDECTOMY       SECTION      HYSTERECTOMY           Social History   Social History     Substance and Sexual Activity   Alcohol Use Yes    Frequency: Monthly or less    Binge frequency: Never    Comment: occassional     Social History     Substance and Sexual Activity   Drug Use Never     Social History     Tobacco Use   Smoking Status Never Smoker   Smokeless Tobacco Never Used     Family History: aunt  with unknown form of cancer  No other pertinent family history  Meds/Allergies      current meds:   Current Facility-Administered Medications   Medication Dose Route Frequency    acetaminophen (TYLENOL) tablet 650 mg  650 mg Oral Q6H PRN    aztreonam (AZACTAM) 1,000 mg in sodium chloride 0 9 % 50 mL IVPB  1,000 mg Intravenous Q8H    enoxaparin (LOVENOX) subcutaneous injection 40 mg  40 mg Subcutaneous Daily    HYDROmorphone (DILAUDID) injection 0 5 mg  0 5 mg Intravenous Q4H PRN    levofloxacin (LEVAQUIN) IVPB (premix) 500 mg  500 mg Intravenous Q24H    ondansetron (ZOFRAN) injection 4 mg  4 mg Intravenous Q6H PRN    sodium chloride 0 9 % infusion  75 mL/hr Intravenous Continuous     Allergies   Allergen Reactions    Amoxicillin-Pot Clavulanate Hives, Itching, Wheezing and Facial Swelling    Bee Venom Anaphylaxis    Celecoxib Rash       Objective   Vitals: Blood pressure 116/65, pulse 84, temperature 99 8 °F (37 7 °C), resp   rate 20, height 5' 8" (1 727 m), weight 106 kg (234 lb), SpO2 93 %     I/O last 24 hours: In: 2693 8 [P O :360; I V :2333 8]  Out: 1655 [Urine:1655]    Invasive Devices     Peripheral Intravenous Line            Peripheral IV 09/07/19 Left Forearm 1 day    Peripheral IV 09/07/19 Right Forearm 1 day                Physical Exam   Constitutional: She is oriented to person, place, and time  She appears well-developed and well-nourished  No distress  HENT:   Head: Normocephalic and atraumatic  Right Ear: External ear normal    Left Ear: External ear normal    Mouth/Throat: Oropharynx is clear and moist    Eyes: Conjunctivae and EOM are normal    Neck: Normal range of motion  Neck supple  No tracheal deviation present  Cardiovascular: Normal rate, normal heart sounds and intact distal pulses  Pulmonary/Chest: Breath sounds normal  No respiratory distress  Abdominal: Soft  Bowel sounds are normal  She exhibits no distension and no mass  There is no tenderness  There is no rebound and no guarding  No hernia  No back pain, no CVA tenderness   Musculoskeletal: Normal range of motion  She exhibits no edema  Lymphadenopathy:     She has no cervical adenopathy  Neurological: She is alert and oriented to person, place, and time  Skin: Skin is warm and dry  Capillary refill takes less than 2 seconds  She is not diaphoretic  No erythema  Psychiatric: She has a normal mood and affect  Her behavior is normal  Thought content normal        Lab Results:   I have personally reviewed pertinent reports      CBC:   Lab Results   Component Value Date    WBC 4 10 (L) 09/09/2019    HGB 12 5 09/09/2019    HCT 38 9 09/09/2019    MCV 86 09/09/2019    PLT 79 (L) 09/09/2019    MCH 27 7 09/09/2019    MCHC 32 1 09/09/2019    RDW 15 1 09/09/2019    MPV 11 5 09/09/2019    NRBC 0 09/09/2019     CMP:   Lab Results   Component Value Date    SODIUM 131 (L) 09/09/2019    CL 97 (L) 09/09/2019    CO2 23 09/09/2019    BUN 9 09/09/2019    CREATININE 0 92 09/09/2019    CALCIUM 7 9 (L) 09/09/2019    EGFR 70 09/09/2019     Urinalysis: Results for Andressa Dave (MRN [id number]) as of 9/9/2019 12:32   Ref  Range 9/7/2019 17:11   Color, UA Unknown Didi   Clarity, UA Unknown Clear   SL AMB SPECIFIC GRAVITY_URINE Latest Ref Range: 1 003 - 1 030  >=1 030   Glucose, UA Latest Ref Range: Negative mg/dl Negative   Ketones, UA Latest Ref Range: Negative mg/dl 15 (1+) (A)   Blood, UA Latest Ref Range: Negative  Negative   Nitrite, UA Latest Ref Range: Negative  Positive (A)   Leukocytes, UA Latest Ref Range: Negative  Trace (A)   pH, UA Latest Ref Range: 4 5, 5 0, 5 5, 6 0, 6 5, 7 0, 7 5, 8 0  5 5   POCT URINE PROTEIN Latest Ref Range: Negative mg/dl 100 (2+) (A)   Bilirubin, UA Latest Ref Range: Negative  Interference- unable to analyze (A)   SL AMB POCT UROBILINOGEN Latest Ref Range: 0 2, 1 0 E U /dl E U /dl 4 0 (A)   RBC, UA Latest Ref Range: None Seen, 0-5 /hpf 1-2 (A)   WBC, UA Latest Ref Range: None Seen, 0-5, 5-55, 5-65 /hpf 2-4 (A)   Bacteria, UA Latest Ref Range: None Seen, Occasional /hpf Innumerable (A)      Urine Culture: pending    Imaging Studies: I have personally reviewed pertinent reports  CT Abd/Pelvis wo contrast: 9/7/2019  Impression:       1   No urinary tract calculi  2   Bilateral hydronephrosis, mild on the right and moderate on the left, developing since a sonogram from 10/7/2016  EKG, Pathology, and Other Studies: I have personally reviewed pertinent reports  VTE Prophylaxis: Enoxaparin (Lovenox)    Code Status: Level 1 - Full Code  Advance Directive and Living Will:      Power of :    POLST:      Counseling / Coordination of Care  Total floor / unit time spent today 40 minutes  Greater than 50% of total time was spent with the patient and / or family counseling and / or coordination of care  A description of the counseling / coordination of care: Etiology of condition and treatment options      Ting Street PA-C

## 2019-09-09 NOTE — SOCIAL WORK
Chart reviewed by case management, assessment completed at the bedside, pt drives and she is independent, , pt lives with  Her spouse in a bilevel home 7 steps up and 6 steps down , br on each level, pt has a rx plan at [employer], pt has [facility]hc in the past and she is unsure of the agency,  Pt is independent, drives and works in a [employer], pt has a cane, walker, commode ,shower chair and bp cuff, pt does wear glasses and she states that she is able to read the medication labels, pt denies any d/c needs, pt's  will transport the pt home when stable for d/c ,d/c plan was discussed at care coordination rounds today, no d/c today, cultures are pending, pt  Denies any d/c needs, cm will continue to follow and assess for any additional d/c needs,  Patient/caregiver received discharge checklist   Content reviewed  Patient/caregiver encouraged to participate in discharge plan of care prior to discharge home  CM reviewed d/c planning process including the following: identifying help at home, patient preference for d/c planning needs, availability of treatment team to discuss questions or concerns patient and/or family may have regarding understanding medications and recognizing signs and symptoms once discharged  CM also encouraged patient to follow up with all recommended appointments after discharge  Patient advised of importance for patient and family to participate in managing patients medical well being

## 2019-09-09 NOTE — ASSESSMENT & PLAN NOTE
CT abdomen and pelvis on admission :   IMPRESSION:  1  No urinary tract calculi  2   Bilateral hydronephrosis, mild on the right and moderate on the left, developing since a sonogram from 10/7/2016  Will follow urinary retention protocol  Check post void residuals  Strain all urine - no radiopaque stones noted on CT scan but new hydronephrosis with flank pain and UTI will need close monitoring for possible stone  Urology consultation appreciated- outpatient follow-up in office     Consider repeat renal ultrasound - inpatient vs  Outpatient - to ensure improvement

## 2019-09-09 NOTE — PROGRESS NOTES
Progress Note - Rosie Koo 1965, 52 y o  female MRN: [id number]    Unit/Bed#: 410-01 Encounter: [id number]    Primary Care Provider: Harinder Comer DO   Date and time admitted to hospital: 9/7/2019  4:45 PM        Hydronephrosis  Assessment & Plan  CT abdomen and pelvis on admission :   IMPRESSION:  1  No urinary tract calculi  2   Bilateral hydronephrosis, mild on the right and moderate on the left, developing since a sonogram from 10/7/2016  Will follow urinary retention protocol  Check post void residuals  Strain all urine - no radiopaque stones noted on CT scan but new hydronephrosis with flank pain and UTI will need close monitoring for possible stone  Urology consultation appreciated- outpatient follow-up in office  Consider repeat renal ultrasound - inpatient vs  Outpatient - to ensure improvement        Acute cystitis without hematuria  Assessment & Plan  UA results consistent with UTI  Hydronephrosis, persistent fever, and flank pain today  Probable early pyelonephritis  He is febrile upon arrival to the ER  Continue IV Levaquin and IV aztreonam which was added for broader coverage given persistent fevers yesterday  Of note, cefepime was considered but avoided due to significant allergy to penicillin  Blood and urine cultures pending      Hyponatremia  Assessment & Plan  A sodium level at 132 on admission , improved to 134 yesterday, now 131 today     Most likely hypovolemic hyponatremia, patient continues to have low PO intake  Continue IV normal saline, but will reduce rate to 75mL/hr  Elevated d-dimer  Assessment & Plan  Likely in the setting of acute infection / sepsis  D-dimer level at 5,386   CT pe study negative for acute PE  Venous duplex study of the BLE negative for DVT          * Sepsis secondary to UTI [facility])  Assessment & Plan  Present on admission with and Fever, tachycardia in the setting of abnormal urinalysis suggestive of UTI, and exam and CT findings suggestive of probable early pyelonephritis  Sepsis protocol initiated in the ER including IV fluid hydration and IV Levaquin  Will continue IV levaquin and IV aztreonam for broader coverage given persistent fevers yesterday  Note cefepime was considered but avoided due to significant allergy to penicillin  Blood cultures pending, urine cultures pending  Procalcitonin elevated at 0 47          VTE Pharmacologic Prophylaxis:   Pharmacologic: Enoxaparin (Lovenox)  Mechanical VTE Prophylaxis in Place: Yes    Patient Centered Rounds: I have performed bedside rounds with nursing staff today  Discussions with Specialists or Other Care Team Provider: nursing, CM, and attending      Time Spent for Care: 20 minutes  More than 50% of total time spent on counseling and coordination of care as described above  Current Length of Stay: 2 day(s)    Current Patient Status: Inpatient   Certification Statement: The patient will continue to require additional inpatient hospital stay due to continued need for IV fluids and IV antibiotics  Discharge Plan: TBD    Code Status: Level 1 - Full Code      Subjective: The patient was seen and examined  The patient states she's still feeling nauseous and has decreased po intake  Objective:     Vitals:   Temp (24hrs), Av 7 °F (37 6 °C), Min:98 2 °F (36 8 °C), Max:103 2 °F (39 6 °C)    Temp:  [98 2 °F (36 8 °C)-103 2 °F (39 6 °C)] 98 7 °F (37 1 °C)  HR:  [] 74  Resp:  [18-20] 20  BP: ()/(61-70) 124/70  SpO2:  [89 %-98 %] 97 %  Body mass index is 35 58 kg/m²  Input and Output Summary (last 24 hours): Intake/Output Summary (Last 24 hours) at 2019 1507  Last data filed at 2019 1246  Gross per 24 hour   Intake 1230 ml   Output 1255 ml   Net -25 ml       Physical Exam:     Physical Exam   Constitutional: She is oriented to person, place, and time  Vital signs are normal  She appears well-developed and well-nourished  She is active and cooperative  Cardiovascular: Normal rate and regular rhythm  Pulmonary/Chest: Effort normal and breath sounds normal  She has no wheezes  She has no rhonchi  She has no rales  Neurological: She is alert and oriented to person, place, and time  No cranial nerve deficit  Skin: Skin is warm, dry and intact  Nursing note and vitals reviewed  Additional Data:     Labs:    Results from last 7 days   Lab Units 09/09/19  0809   WBC Thousand/uL 4 10*   HEMOGLOBIN g/dL 12 5   HEMATOCRIT % 38 9   PLATELETS Thousands/uL 79*   NEUTROS PCT % 55   LYMPHS PCT % 37   MONOS PCT % 6   EOS PCT % 0     Results from last 7 days   Lab Units 09/09/19  0809 09/08/19  0501   SODIUM mmol/L 131* 134*   POTASSIUM mmol/L 3 8 3 6   CHLORIDE mmol/L 97* 100   CO2 mmol/L 23 27   BUN mg/dL 9 8   CREATININE mg/dL 0 92 0 94   ANION GAP mmol/L 11 7   CALCIUM mg/dL 7 9* 8 2*   ALBUMIN g/dL  --  2 8*   TOTAL BILIRUBIN mg/dL  --  2 60*   ALK PHOS U/L  --  195*   ALT U/L  --  49   AST U/L  --  58*   GLUCOSE RANDOM mg/dL 128 96     Results from last 7 days   Lab Units 09/07/19  1704   INR  1 02             Results from last 7 days   Lab Units 09/08/19  0501 09/07/19  2151 09/07/19  1922 09/07/19  1704   LACTIC ACID mmol/L  --   --  1 1 1 2   PROCALCITONIN ng/ml 0 67* 0 50*  --  0 47*           * I Have Reviewed All Lab Data Listed Above  * Additional Pertinent Lab Tests Reviewed: All Labs Within Last 24 Hours Reviewed    Imaging:    Imaging Reports Reviewed Today Include: none  Imaging Personally Reviewed by Myself Includes:  none    Recent Cultures (last 7 days):     Results from last 7 days   Lab Units 09/08/19  0742 09/07/19  1704   BLOOD CULTURE   --  No Growth at 24 hrs  No Growth at 24 hrs     URINE CULTURE  <10,000 cfu/ml   --        Last 24 Hours Medication List:     Current Facility-Administered Medications:  acetaminophen 650 mg Oral Q6H PRN Jd Mireles PA-C    aztreonam 1,000 mg Intravenous Q8H Juan Almaguer MD Last Rate: 1,000 mg (09/09/19 0750)   ciprofloxacin 1 drop Right Eye Q2H While awake Dago Mckeon PA-C    enoxaparin 40 mg Subcutaneous Daily Jd Mireles PA-C    HYDROmorphone 0 5 mg Intravenous Q4H PRN Emely Cespedes PA-C    levofloxacin 500 mg Intravenous Q24H Emely Cespedes PA-C Last Rate: 500 mg (09/08/19 1740)   ondansetron 4 mg Intravenous Q6H PRN Jd Mireles PA-C    sodium chloride 75 mL/hr Intravenous Continuous Erickson Arndt PA-C Last Rate: 75 mL/hr (09/09/19 1317)        Today, Patient Was Seen By: Dago Mckeon PA-C    ** Please Note: Dictation voice to text software may have been used in the creation of this document   **

## 2019-09-09 NOTE — ASSESSMENT & PLAN NOTE
UA results consistent with UTI  Hydronephrosis, persistent fever, and flank pain today  Probable early pyelonephritis  He is febrile upon arrival to the ER  Continue IV Levaquin and IV aztreonam which was added for broader coverage given persistent fevers yesterday  Of note, cefepime was considered but avoided due to significant allergy to penicillin    Blood and urine cultures pending

## 2019-09-10 ENCOUNTER — APPOINTMENT (INPATIENT)
Dept: CT IMAGING | Facility: HOSPITAL | Age: 54
DRG: 872 | End: 2019-09-10
Payer: COMMERCIAL

## 2019-09-10 ENCOUNTER — TELEPHONE (OUTPATIENT)
Dept: OTHER | Facility: HOSPITAL | Age: 54
End: 2019-09-10

## 2019-09-10 PROBLEM — R50.9 ACUTE FEBRILE ILLNESS: Status: ACTIVE | Noted: 2019-09-10

## 2019-09-10 LAB
ALBUMIN SERPL BCP-MCNC: 2.4 G/DL (ref 3.5–5)
ALP SERPL-CCNC: 258 U/L (ref 46–116)
ALT SERPL W P-5'-P-CCNC: 68 U/L (ref 12–78)
ANION GAP SERPL CALCULATED.3IONS-SCNC: 7 MMOL/L (ref 4–13)
AST SERPL W P-5'-P-CCNC: 118 U/L (ref 5–45)
BASOPHILS # BLD AUTO: 0.02 THOUSANDS/ΜL (ref 0–0.1)
BASOPHILS NFR BLD AUTO: 0 % (ref 0–1)
BILIRUB DIRECT SERPL-MCNC: 4.91 MG/DL (ref 0–0.2)
BILIRUB SERPL-MCNC: 6 MG/DL (ref 0.2–1)
BUN SERPL-MCNC: 7 MG/DL (ref 5–25)
CALCIUM SERPL-MCNC: 8 MG/DL (ref 8.3–10.1)
CHLORIDE SERPL-SCNC: 95 MMOL/L (ref 100–108)
CO2 SERPL-SCNC: 26 MMOL/L (ref 21–32)
CREAT SERPL-MCNC: 1.01 MG/DL (ref 0.6–1.3)
EOSINOPHIL # BLD AUTO: 0.03 THOUSAND/ΜL (ref 0–0.61)
EOSINOPHIL NFR BLD AUTO: 1 % (ref 0–6)
ERYTHROCYTE [DISTWIDTH] IN BLOOD BY AUTOMATED COUNT: 15.2 % (ref 11.6–15.1)
GFR SERPL CREATININE-BSD FRML MDRD: 63 ML/MIN/1.73SQ M
GLUCOSE SERPL-MCNC: 102 MG/DL (ref 65–140)
HCT VFR BLD AUTO: 37.9 % (ref 34.8–46.1)
HGB BLD-MCNC: 12 G/DL (ref 11.5–15.4)
IMM GRANULOCYTES # BLD AUTO: 0.05 THOUSAND/UL (ref 0–0.2)
IMM GRANULOCYTES NFR BLD AUTO: 1 % (ref 0–2)
LYMPHOCYTES # BLD AUTO: 1.84 THOUSANDS/ΜL (ref 0.6–4.47)
LYMPHOCYTES NFR BLD AUTO: 38 % (ref 14–44)
MCH RBC QN AUTO: 27.6 PG (ref 26.8–34.3)
MCHC RBC AUTO-ENTMCNC: 31.7 G/DL (ref 31.4–37.4)
MCV RBC AUTO: 87 FL (ref 82–98)
MONOCYTES # BLD AUTO: 0.31 THOUSAND/ΜL (ref 0.17–1.22)
MONOCYTES NFR BLD AUTO: 6 % (ref 4–12)
NEUTROPHILS # BLD AUTO: 2.59 THOUSANDS/ΜL (ref 1.85–7.62)
NEUTS SEG NFR BLD AUTO: 54 % (ref 43–75)
NRBC BLD AUTO-RTO: 0 /100 WBCS
PLATELET # BLD AUTO: 87 THOUSANDS/UL (ref 149–390)
PMV BLD AUTO: 12.6 FL (ref 8.9–12.7)
POTASSIUM SERPL-SCNC: 3.6 MMOL/L (ref 3.5–5.3)
PROCALCITONIN SERPL-MCNC: 1.82 NG/ML
PROT SERPL-MCNC: 6.4 G/DL (ref 6.4–8.2)
RBC # BLD AUTO: 4.34 MILLION/UL (ref 3.81–5.12)
SODIUM SERPL-SCNC: 128 MMOL/L (ref 136–145)
WBC # BLD AUTO: 4.84 THOUSAND/UL (ref 4.31–10.16)

## 2019-09-10 PROCEDURE — 80048 BASIC METABOLIC PNL TOTAL CA: CPT | Performed by: PHYSICIAN ASSISTANT

## 2019-09-10 PROCEDURE — G0427 INPT/ED TELECONSULT70: HCPCS | Performed by: INTERNAL MEDICINE

## 2019-09-10 PROCEDURE — 86663 EPSTEIN-BARR ANTIBODY: CPT | Performed by: PHYSICIAN ASSISTANT

## 2019-09-10 PROCEDURE — 99232 SBSQ HOSP IP/OBS MODERATE 35: CPT | Performed by: PHYSICIAN ASSISTANT

## 2019-09-10 PROCEDURE — 74177 CT ABD & PELVIS W/CONTRAST: CPT

## 2019-09-10 PROCEDURE — 85025 COMPLETE CBC W/AUTO DIFF WBC: CPT | Performed by: PHYSICIAN ASSISTANT

## 2019-09-10 PROCEDURE — 86665 EPSTEIN-BARR CAPSID VCA: CPT | Performed by: PHYSICIAN ASSISTANT

## 2019-09-10 PROCEDURE — 87086 URINE CULTURE/COLONY COUNT: CPT | Performed by: PHYSICIAN ASSISTANT

## 2019-09-10 PROCEDURE — 80076 HEPATIC FUNCTION PANEL: CPT | Performed by: PHYSICIAN ASSISTANT

## 2019-09-10 PROCEDURE — 87040 BLOOD CULTURE FOR BACTERIA: CPT | Performed by: PHYSICIAN ASSISTANT

## 2019-09-10 PROCEDURE — 84145 PROCALCITONIN (PCT): CPT | Performed by: PHYSICIAN ASSISTANT

## 2019-09-10 PROCEDURE — 86664 EPSTEIN-BARR NUCLEAR ANTIGEN: CPT | Performed by: PHYSICIAN ASSISTANT

## 2019-09-10 PROCEDURE — 86618 LYME DISEASE ANTIBODY: CPT | Performed by: PHYSICIAN ASSISTANT

## 2019-09-10 RX ORDER — CEFTRIAXONE 1 G/50ML
1000 INJECTION, SOLUTION INTRAVENOUS EVERY 24 HOURS
Status: DISCONTINUED | OUTPATIENT
Start: 2019-09-10 | End: 2019-09-10

## 2019-09-10 RX ORDER — CEFTRIAXONE 2 G/50ML
2000 INJECTION, SOLUTION INTRAVENOUS EVERY 24 HOURS
Status: DISCONTINUED | OUTPATIENT
Start: 2019-09-10 | End: 2019-09-16

## 2019-09-10 RX ORDER — HYDROMORPHONE HCL/PF 1 MG/ML
0.5 SYRINGE (ML) INJECTION ONCE
Status: COMPLETED | OUTPATIENT
Start: 2019-09-11 | End: 2019-09-11

## 2019-09-10 RX ADMIN — CIPROFLOXACIN 1 DROP: 3 SOLUTION OPHTHALMIC at 05:48

## 2019-09-10 RX ADMIN — CIPROFLOXACIN 1 DROP: 3 SOLUTION OPHTHALMIC at 13:22

## 2019-09-10 RX ADMIN — CEFTRIAXONE 2000 MG: 2 INJECTION, SOLUTION INTRAVENOUS at 14:33

## 2019-09-10 RX ADMIN — ACETAMINOPHEN 650 MG: 325 TABLET, FILM COATED ORAL at 18:43

## 2019-09-10 RX ADMIN — CIPROFLOXACIN 1 DROP: 3 SOLUTION OPHTHALMIC at 18:44

## 2019-09-10 RX ADMIN — CIPROFLOXACIN 1 DROP: 3 SOLUTION OPHTHALMIC at 07:57

## 2019-09-10 RX ADMIN — CIPROFLOXACIN 1 DROP: 3 SOLUTION OPHTHALMIC at 22:27

## 2019-09-10 RX ADMIN — HYDROMORPHONE HYDROCHLORIDE 0.5 MG: 1 INJECTION, SOLUTION INTRAMUSCULAR; INTRAVENOUS; SUBCUTANEOUS at 18:43

## 2019-09-10 RX ADMIN — HYDROMORPHONE HYDROCHLORIDE 0.5 MG: 1 INJECTION, SOLUTION INTRAMUSCULAR; INTRAVENOUS; SUBCUTANEOUS at 03:33

## 2019-09-10 RX ADMIN — IOHEXOL 100 ML: 350 INJECTION, SOLUTION INTRAVENOUS at 12:21

## 2019-09-10 RX ADMIN — SODIUM CHLORIDE 75 ML/HR: 0.9 INJECTION, SOLUTION INTRAVENOUS at 03:34

## 2019-09-10 RX ADMIN — CIPROFLOXACIN 1 DROP: 3 SOLUTION OPHTHALMIC at 11:08

## 2019-09-10 RX ADMIN — CIPROFLOXACIN 1 DROP: 3 SOLUTION OPHTHALMIC at 16:58

## 2019-09-10 RX ADMIN — CIPROFLOXACIN 1 DROP: 3 SOLUTION OPHTHALMIC at 20:19

## 2019-09-10 RX ADMIN — ACETAMINOPHEN 650 MG: 325 TABLET, FILM COATED ORAL at 07:56

## 2019-09-10 RX ADMIN — ENOXAPARIN SODIUM 40 MG: 40 INJECTION SUBCUTANEOUS at 08:00

## 2019-09-10 RX ADMIN — HYDROMORPHONE HYDROCHLORIDE 0.5 MG: 1 INJECTION, SOLUTION INTRAMUSCULAR; INTRAVENOUS; SUBCUTANEOUS at 13:49

## 2019-09-10 RX ADMIN — HYDROMORPHONE HYDROCHLORIDE 0.5 MG: 1 INJECTION, SOLUTION INTRAMUSCULAR; INTRAVENOUS; SUBCUTANEOUS at 22:40

## 2019-09-10 RX ADMIN — CIPROFLOXACIN 1 DROP: 3 SOLUTION OPHTHALMIC at 14:36

## 2019-09-10 NOTE — TELEPHONE ENCOUNTER
Fantasma Teran is a 68-year-old female seen in consultation at [facility] for UTI in suspected UPJ obstruction  Please contact patient with non urgent hospital follow-up in approximately 4 weeks for further workup  Thank you

## 2019-09-10 NOTE — ASSESSMENT & PLAN NOTE
Patient with fevers despite treatment with IV Levaquin and IV aztreonam   Infectious disease consult appreciated  Blood cultures (9/7) no growth at 24 hours- repeat blood cultures today  Urine culture (9/7) negative  Repeat urine culture today  Procalcitonin elevated at 0 47, repeat now  Discontinue IV Levaquin and IV aztreonam and start on IV rocephin today her ID recommendations  ID also recommending labs to check for: EBV, CMV, Lyme, Ehrlichia  CT abdomen pelvis with contrast given elevated LFT's on admission  Check LFT's now  If workup is negative and patient is afebrile 24-48 hours may consider empiric doxycycline per ID

## 2019-09-10 NOTE — ASSESSMENT & PLAN NOTE
A sodium level at 132 on admission , improved to 134 on 9/8, now 128 today     Most likely hypovolemic hyponatremia, patient continues to have decreased PO intake  Continue IV normal saline

## 2019-09-10 NOTE — PROGRESS NOTES
Progress Note - Na Zamorano 1965, 52 y o  female MRN: [id number]    Unit/Bed#: 410-01 Encounter: [id number]    Primary Care Provider: Alexus David DO   Date and time admitted to hospital: 9/7/2019  4:45 PM        * Sepsis secondary to UTI [facility])  Assessment & Plan  Present on admission with and Fever, tachycardia in the setting of abnormal urinalysis suggestive of UTI, and exam and CT findings suggestive of probable early pyelonephritis  Sepsis protocol initiated in the ER including IV fluid hydration and IV Levaquin  Patient continues to have fevers with a temperature of 103 overnight last night (9/9)  Infectious disease consulted  Will discontinue IV levaquin and IV aztreonam and switched to IV Rocephin per ID recommendations  Blood cultures (9/7) no growth at 24 hours  Urine culture(9/7) negative  Procalcitonin elevated at 0 47, repeat now  Repeat blood cultures and urine culture today per ID recommendations  Acute febrile illness  Assessment & Plan  Patient with fevers despite treatment with IV Levaquin and IV aztreonam   Infectious disease consult appreciated  Blood cultures (9/7) no growth at 24 hours- repeat blood cultures today  Urine culture (9/7) negative  Repeat urine culture today  Procalcitonin elevated at 0 47, repeat now  Discontinue IV Levaquin and IV aztreonam and start on IV rocephin today her ID recommendations  ID also recommending labs to check for: EBV, CMV, Lyme, Ehrlichia  CT abdomen pelvis with contrast given elevated LFT's on admission  Check LFT's now  If workup is negative and patient is afebrile 24-48 hours may consider empiric doxycycline per ID  Acute cystitis without hematuria  Assessment & Plan  UA results consistent with UTI  Hydronephrosis, persistent fever, and flank pain today  Probable early pyelonephritis    He is febrile upon arrival to the ER  Continue IV Levaquin and IV aztreonam which was added for broader coverage given persistent fevers yesterday  Of note, cefepime was considered but avoided due to significant allergy to penicillin  Blood and urine cultures pending      Elevated d-dimer  Assessment & Plan  Likely in the setting of acute infection / sepsis  D-dimer level at 5,386   CT pe study negative for acute PE  Venous duplex study of the BLE negative for DVT  Hyponatremia  Assessment & Plan  A sodium level at 132 on admission , improved to 134 on 9/8, now 128 today     Most likely hypovolemic hyponatremia, patient continues to have decreased PO intake  Continue IV normal saline      Hydronephrosis  Assessment & Plan  CT abdomen and pelvis on admission :   IMPRESSION:  1  No urinary tract calculi  2   Bilateral hydronephrosis, mild on the right and moderate on the left, developing since a sonogram from 10/7/2016  Will follow urinary retention protocol  Check post void residuals  Strain all urine - no radiopaque stones noted on CT scan but new hydronephrosis with flank pain and UTI will need close monitoring for possible stone  Urology consultation appreciated- outpatient follow-up in office  Consider repeat renal ultrasound - inpatient vs  Outpatient - to ensure improvement          VTE Pharmacologic Prophylaxis:   Pharmacologic: Enoxaparin (Lovenox)  Mechanical VTE Prophylaxis in Place: Yes    Patient Centered Rounds: I have performed bedside rounds with nursing staff today  Discussions with Specialists or Other Care Team Provider: Nursing, CM, and attending      Time Spent for Care: 30 minutes  More than 50% of total time spent on counseling and coordination of care as described above  Current Length of Stay: 3 day(s)    Current Patient Status: Inpatient   Certification Statement: The patient will continue to require additional inpatient hospital stay due to continued need for IV antibiotics, Continued workup for febrile illness with CT scan and additional labs       Discharge Plan: TBD    Code Status: Level 1 - Full Code      Subjective: The patient was seen and examined  The patient states she continues to have decreased po intake due to nausea  She was noted to have a fever of 103 overnight  Objective:     Vitals:   Temp (24hrs), Av 2 °F (37 9 °C), Min:98 6 °F (37 °C), Max:103 °F (39 4 °C)    Temp:  [98 6 °F (37 °C)-103 °F (39 4 °C)] 98 6 °F (37 °C)  HR:  [78-93] 92  Resp:  [18-20] 20  BP: (112-134)/(63-67) 121/66  SpO2:  [85 %-97 %] 97 %  Body mass index is 35 9 kg/m²  Input and Output Summary (last 24 hours): Intake/Output Summary (Last 24 hours) at 9/10/2019 1230  Last data filed at 9/10/2019 0837  Gross per 24 hour   Intake 240 ml   Output 1500 ml   Net -1260 ml       Physical Exam:     Physical Exam   Constitutional: She is oriented to person, place, and time  She appears well-developed and well-nourished  She is active and cooperative  Cardiovascular: Normal rate and regular rhythm  Pulmonary/Chest: Effort normal and breath sounds normal  She has no wheezes  She has no rhonchi  She has no rales  Abdominal: Soft  Normal appearance and bowel sounds are normal  There is no tenderness  Neurological: She is alert and oriented to person, place, and time  No cranial nerve deficit  Skin: Skin is warm, dry and intact  Nursing note and vitals reviewed      Additional Data:     Labs:    Results from last 7 days   Lab Units 09/10/19  0517   WBC Thousand/uL 4 84   HEMOGLOBIN g/dL 12 0   HEMATOCRIT % 37 9   PLATELETS Thousands/uL 87*   NEUTROS PCT % 54   LYMPHS PCT % 38   MONOS PCT % 6   EOS PCT % 1     Results from last 7 days   Lab Units 09/10/19  0517   SODIUM mmol/L 128*   POTASSIUM mmol/L 3 6   CHLORIDE mmol/L 95*   CO2 mmol/L 26   BUN mg/dL 7   CREATININE mg/dL 1 01   ANION GAP mmol/L 7   CALCIUM mg/dL 8 0*   ALBUMIN g/dL 2 4*   TOTAL BILIRUBIN mg/dL 6 00*   ALK PHOS U/L 258*   ALT U/L 68   AST U/L 118*   GLUCOSE RANDOM mg/dL 102     Results from last 7 days Lab Units 09/07/19  1704   INR  1 02             Results from last 7 days   Lab Units 09/08/19  0501 09/07/19  2151 09/07/19  1922 09/07/19  1704   LACTIC ACID mmol/L  --   --  1 1 1 2   PROCALCITONIN ng/ml 0 67* 0 50*  --  0 47*           * I Have Reviewed All Lab Data Listed Above  * Additional Pertinent Lab Tests Reviewed: All Labs Within Last 24 Hours Reviewed    Imaging:    Imaging Reports Reviewed Today Include: none  Imaging Personally Reviewed by Myself Includes:  none    Recent Cultures (last 7 days):     Results from last 7 days   Lab Units 09/08/19  0742 09/07/19  1704   BLOOD CULTURE   --  No Growth at 24 hrs  No Growth at 24 hrs  URINE CULTURE  <10,000 cfu/ml   --        Last 24 Hours Medication List:     Current Facility-Administered Medications:  acetaminophen 650 mg Oral Q6H PRN Jd Mireles PA-C    cefTRIAXone 2,000 mg Intravenous Q24H Zach Seay PA-C    ciprofloxacin 1 drop Right Eye Q2H While awake Zach Seay PA-C    enoxaparin 40 mg Subcutaneous Daily Jd Mireles PA-C    HYDROmorphone 0 5 mg Intravenous Q4H PRN Erickson Arndt PA-C    ondansetron 4 mg Intravenous Q6H PRN Jd Mireles PA-C    sodium chloride 75 mL/hr Intravenous Continuous Erickson Arndt PA-C Last Rate: 75 mL/hr (09/10/19 0334)        Today, Patient Was Seen By: Zach Seay PA-C    ** Please Note: Dictation voice to text software may have been used in the creation of this document   **

## 2019-09-10 NOTE — ASSESSMENT & PLAN NOTE
Present on admission with and Fever, tachycardia in the setting of abnormal urinalysis suggestive of UTI, and exam and CT findings suggestive of probable early pyelonephritis  Sepsis protocol initiated in the ER including IV fluid hydration and IV Levaquin  Patient continues to have fevers with a temperature of 103 overnight last night (9/9)  Infectious disease consulted  Will discontinue IV levaquin and IV aztreonam and switched to IV Rocephin per ID recommendations  Blood cultures (9/7) no growth at 24 hours  Urine culture(9/7) negative  Procalcitonin elevated at 0 47, repeat now  Repeat blood cultures and urine culture today per ID recommendations

## 2019-09-10 NOTE — TELEMEDICINE
Consultation - Infectious Disease   Juana Thompson 52 y o  female MRN: [id number]  Unit/Bed#: 410-01 Encounter: [id number]      Inpatient consult to Infectious Diseases  Consult performed by: Adore Perales MD  Consult ordered by: Nora Oconnor PA-C            REQUIRED DOCUMENTATION:     1  This service was provided via Telemedicine  2  Provider located at Home  3  TeleMed provider: Adore Perales MD   4  Identify all parties in room with patient during tele consult:  RN  5  After connecting through [employer], patient was identified by name and date of birth and assistant checked wristband  Patient was then informed that this was a Telemedicine visit and that the exam was being conducted confidentially over secure lines  My office door was closed  No one else was in the room  Patient acknowledged consent and understanding of privacy and security of the Telemedicine visit, and gave us permission to have the assistant stay in the room in order to assist with the history and to conduct the exam   I informed the patient that I have reviewed their record in Epic and presented the opportunity for them to ask any questions regarding the visit today  The patient agreed to participate  Assessment/Recommendations     69-year-old female presents with sepsis like syndrome, new onset nonobstructive hydronephrosis, elevated LFTs, suspected UTI    1  Sepsis, present on admission, suspected based on fever, tachycardia, tachypnea  - Source of sepsis is suspected to be acute pyelonephritis  - Clinically stable without leukocytosis but remains febrile    · Management as below    2  Acute febrile illness  - Patient has associated flank pain, nausea and hydronephrosis on imaging which suggests pyelonephritis as a cause of her fever  However, urinalysis only showed trace pyuria and urine cultures are negative, although they were obtained after antibiotics     - Patient also had elevated LFTs but limited CT abdomen showed no biliary pathology and patient has no localizing symptoms   - Other causes of fever such as pneumonia, bacteremia are unlikely with negative imaging/cultures  - No other clinical features suggestive of viral or tick borne illness  - Non infectious causes like autoimmune disease less likely given lack of associated symptoms, unremarkable medical history  - Persistently febrile but without leukocytosis    · Antibiotic management as below  · Recommend checking CT abdomen with contrast   · Repeat blood and urine cultures  · Please check Lyme, ehrlichia serology, EBV, CMV   · Repeat procalcitonin and LFTs to monitor trend  · If work up is unremarkable and patient remains febrile over the next 24-48 hours may consider empiric doxycycline    3  Complicated UTI/ new onset hydronephrosis  - Diagnosis suspected clinically based on fever, flank pain  - urinalysis with only trace pyuria and urine cultures are negative but were obtained after antibiotics  - Imaging shows non obstructive hydronephrosis  - Suspect pyelonephritis and/or vesicoureteral reflux  - Patient has no risk factors for multi drug resistant organisms  Fever should start to resolve after 72 hours but patient remains febrile today    · Discontinue Aztreonam and levofloxacin and switch to Ceftriaxone 2g iv q24h (different side chain from penicillin)  · Repeat imaging as above  · Monitor clinical course  · If above work up is unremarkable and patient clinically improves can discharge on PO cefpodoxime 200 mg twice daily to complete 14 days of therapy  · Will need outpatient urology follow up    4  Elevated LFTs  - Patient had hyperbilirubinemia, elevated liver enzymes but no upper quadrant abdominal pain  - Low dose CT imaging did not show any gallbladder or biliary tree pathology  - May be secondary to sepsis    · Please repeat LFTs and repeat CT abdomen with contrast    5   Thrombocytopenia  - Unclear cause, no baseline available, may be secondary to sepsis    · Continue to monitor    Thank you for involving me in the care of your patient  Please call with questions, change in clinical status or if tests recommended above are abnormal      Discussed with the primary service  History     Reason for Consult:Fever, pyelonephritis  HPI: Fantasma Teran is a 52y o  year old female with no significant medical history  She has no prior history of recurrent UTIs or urologic abnormalities  She was in her usual state of health for 4-5 days before presentation when she developed bilateral flank pain, worse on the left  Pain was dull, intermittently intense, did not radiate  She initially thought she had pulled a muscle  Pain persisted and few days later she also develped fever to 104 with chills and headache  She had no burning with urination, no urinary urgency or frequency but did have a new darker color to her urine  She had no rash, no respiratory symptoms, no abdominal pain or diarrhea  No preceding illness  No recent tick bites  No sick contacts  She presented to the ER on 09/07  In the ER, she was febrile, tachycardic, tachypneic  Labs were notable for normal lactate without leukocytosis and trace pyuria  She had hyperbilirubinemia and elevated ALP and AST  EKG noted no acute ST changes  CXR showed no infiltrate and CT abdomen showed bilateral nonobstructive hydronephrosis, mild on the right and moderate on the left  She was admitted and started on Levaquin and aztreonam   Blood and urine cultures are negative  Urine culture was obtained after antibiotics  She remains febrile with Tmax of 103 yesterday morning and temp of 100 6 today  Her white count remains normal   Urology was consulted, outpatient follow-up is recommended  She continues to feel run down and has persistent nausea and poor appetite  She has no other new symptoms  Denies nausea, vomiting, diarrhea or rash and is tolerating antibiotics well     Infectious disease is being consulted for diagnostic work up and antibiotic management  Review of Systems  A [id number] point system-based review of systems is otherwise negative  PAST MEDICAL HISTORY:  History reviewed  No pertinent past medical history  Past Surgical History:   Procedure Laterality Date    APPENDECTOMY       SECTION      HYSTERECTOMY             FAMILY HISTORY:  Non-contributory    SOCIAL HISTORY:  Social History   /Civil Union  Social History     Substance and Sexual Activity   Alcohol Use Yes    Frequency: Monthly or less    Binge frequency: Never    Comment: occassional     Social History     Substance and Sexual Activity   Drug Use Never     Social History     Tobacco Use   Smoking Status Never Smoker   Smokeless Tobacco Never Used       ALLERGIES:  Allergies   Allergen Reactions    Amoxicillin-Pot Clavulanate Hives, Itching, Wheezing and Facial Swelling    Bee Venom Anaphylaxis    Celecoxib Rash       MEDICATIONS:  All current active medications have been reviewed      Physical Exam     Temp:  [98 7 °F (37 1 °C)-103 °F (39 4 °C)] 100 6 °F (38 1 °C)  HR:  [74-93] 92  Resp:  [18-20] 20  BP: (112-134)/(63-70) 121/66  SpO2:  [85 %-97 %] 97 %  Temp (24hrs), Av 2 °F (37 9 °C), Min:98 7 °F (37 1 °C), Max:103 °F (39 4 °C)  Current: Temperature: (!) 100 6 °F (38 1 °C)    Intake/Output Summary (Last 24 hours) at 9/10/2019 0926  Last data filed at 9/10/2019 9896  Gross per 24 hour   Intake 240 ml   Output 1500 ml   Net -1260 ml       Physical exam findings reported by bedside and primary medical team staff    General Appearance:  Appearing tired but nontoxic, and in no distress, appears stated age   Head:  Normocephalic, without obvious abnormality, atraumatic   Eyes:  PERRL, conjunctiva pink and sclera anicteric, both eyes   Nose: Nares normal, mucosa normal, no drainage   Throat: Oropharynx moist without lesions; lips, mucosa, and tongue normal; teeth and gums normal   Neck: Supple, symmetrical, trachea midline, no adenopathy, no tenderness/mass/nodules   Back:   Symmetric, no curvature, ROM normal, no CVA tenderness   Lungs:   Decreased breath sounds both bases, no audible wheezes, rhonchi and rales, respirations unlabored   Chest Wall:  No tenderness or deformity   Heart:  Regular rate and rhythm, S1, S2 normal, no murmur, rub or gallop   Abdomen:   Soft, non-tender, non-distended, positive bowel sounds, no masses, no organomegaly    No CVA tenderness   Extremities: Extremities normal, atraumatic, no cyanosis, clubbing or edema   Skin: Skin color, texture, turgor normal, no rashes or lesions  No draining wounds noted  Lymph nodes: Cervical, supraclavicular, and axillary nodes normal   Neurologic: Alert and oriented times 3       Invasive Devices:   Peripheral IV 09/07/19 Right Forearm (Active)   Site Assessment Clean;Dry; Intact 9/9/2019  7:49 AM   Dressing Type Transparent;Securing device 9/9/2019  7:49 AM   Line Status Infusing 9/9/2019  7:49 AM   Dressing Status Clean;Dry; Intact 9/9/2019  7:49 AM       Peripheral IV 09/07/19 Left Forearm (Active)   Site Assessment Clean;Dry; Intact 9/9/2019  7:49 AM   Dressing Type Transparent;Securing device 9/9/2019  7:49 AM   Line Status Saline locked 9/9/2019  7:49 AM   Dressing Status Clean;Dry; Intact 9/9/2019  7:49 AM       Labs, Imaging, & Other Studies     Lab Results:    I have personally reviewed pertinent labs      Results from last 7 days   Lab Units 09/10/19  0517 09/09/19  0809 09/08/19  0501   WBC Thousand/uL 4 84 4 10* 3 61*   HEMOGLOBIN g/dL 12 0 12 5 13 3   PLATELETS Thousands/uL 87* 79* 90*     Results from last 7 days   Lab Units 09/10/19  0517  09/08/19  0501 09/07/19  1704   POTASSIUM mmol/L 3 6   < > 3 6 3 8   CHLORIDE mmol/L 95*   < > 100 96*   CO2 mmol/L 26   < > 27 24   BUN mg/dL 7   < > 8 10   CREATININE mg/dL 1 01   < > 0 94 1 12   EGFR ml/min/1 73sq m 63   < > 69 55   CALCIUM mg/dL 8 0*   < > 8 2* 8 7   AST U/L  --   --  58* 69*   ALT U/L  --   -- 49 63   ALK PHOS U/L  --   --  195* 222*    < > = values in this interval not displayed  Results from last 7 days   Lab Units 09/08/19  0742 09/07/19  1704   BLOOD CULTURE   --  No Growth at 24 hrs  No Growth at 24 hrs  URINE CULTURE  <10,000 cfu/ml   --        Imaging Studies:   I have personally reviewed pertinent imaging study reports and images in PACS  EKG, Pathology, and Other Studies:   I have personally reviewed pertinent reports  Counseling/Coordination of care: Total 70 minutes communication with the patient via telehealth  Labs, medical tests and imaging studies were independently reviewed by me as noted above in HPI and old records were obtained and summarized as noted above in HPI

## 2019-09-11 ENCOUNTER — APPOINTMENT (INPATIENT)
Dept: ULTRASOUND IMAGING | Facility: HOSPITAL | Age: 54
DRG: 872 | End: 2019-09-11
Payer: COMMERCIAL

## 2019-09-11 PROBLEM — E80.6 HYPERBILIRUBINEMIA: Status: ACTIVE | Noted: 2019-09-11

## 2019-09-11 LAB
ALBUMIN SERPL BCP-MCNC: 2.2 G/DL (ref 3.5–5)
ALP SERPL-CCNC: 260 U/L (ref 46–116)
ALT SERPL W P-5'-P-CCNC: 84 U/L (ref 12–78)
ANION GAP SERPL CALCULATED.3IONS-SCNC: 8 MMOL/L (ref 4–13)
AST SERPL W P-5'-P-CCNC: 151 U/L (ref 5–45)
BACTERIA UR CULT: NORMAL
BASOPHILS # BLD MANUAL: 0 THOUSAND/UL (ref 0–0.1)
BASOPHILS NFR MAR MANUAL: 0 % (ref 0–1)
BILIRUB SERPL-MCNC: 6.6 MG/DL (ref 0.2–1)
BUN SERPL-MCNC: 7 MG/DL (ref 5–25)
CALCIUM SERPL-MCNC: 8 MG/DL (ref 8.3–10.1)
CHLORIDE SERPL-SCNC: 94 MMOL/L (ref 100–108)
CO2 SERPL-SCNC: 27 MMOL/L (ref 21–32)
CREAT SERPL-MCNC: 0.93 MG/DL (ref 0.6–1.3)
EOSINOPHIL # BLD MANUAL: 0.07 THOUSAND/UL (ref 0–0.4)
EOSINOPHIL NFR BLD MANUAL: 1 % (ref 0–6)
ERYTHROCYTE [DISTWIDTH] IN BLOOD BY AUTOMATED COUNT: 15.2 % (ref 11.6–15.1)
GFR SERPL CREATININE-BSD FRML MDRD: 69 ML/MIN/1.73SQ M
GLUCOSE SERPL-MCNC: 101 MG/DL (ref 65–140)
HCT VFR BLD AUTO: 34.3 % (ref 34.8–46.1)
HGB BLD-MCNC: 10.9 G/DL (ref 11.5–15.4)
LG PLATELETS BLD QL SMEAR: PRESENT
LYMPHOCYTES # BLD AUTO: 1.53 THOUSAND/UL (ref 0.6–4.47)
LYMPHOCYTES # BLD AUTO: 21 % (ref 14–44)
MCH RBC QN AUTO: 27.5 PG (ref 26.8–34.3)
MCHC RBC AUTO-ENTMCNC: 31.8 G/DL (ref 31.4–37.4)
MCV RBC AUTO: 86 FL (ref 82–98)
MONOCYTES # BLD AUTO: 0.44 THOUSAND/UL (ref 0–1.22)
MONOCYTES NFR BLD: 6 % (ref 4–12)
NEUTROPHILS # BLD MANUAL: 4.59 THOUSAND/UL (ref 1.85–7.62)
NEUTS SEG NFR BLD AUTO: 63 % (ref 43–75)
NRBC BLD AUTO-RTO: 0 /100 WBCS
PLATELET # BLD AUTO: 97 THOUSANDS/UL (ref 149–390)
PLATELET BLD QL SMEAR: ABNORMAL
PMV BLD AUTO: 12.3 FL (ref 8.9–12.7)
POTASSIUM SERPL-SCNC: 3.4 MMOL/L (ref 3.5–5.3)
PROT SERPL-MCNC: 6 G/DL (ref 6.4–8.2)
RBC # BLD AUTO: 3.97 MILLION/UL (ref 3.81–5.12)
SODIUM SERPL-SCNC: 129 MMOL/L (ref 136–145)
TOTAL CELLS COUNTED SPEC: 100
VARIANT LYMPHS # BLD AUTO: 9 %
WBC # BLD AUTO: 7.28 THOUSAND/UL (ref 4.31–10.16)

## 2019-09-11 PROCEDURE — 86666 EHRLICHIA ANTIBODY: CPT | Performed by: PHYSICIAN ASSISTANT

## 2019-09-11 PROCEDURE — 85007 BL SMEAR W/DIFF WBC COUNT: CPT | Performed by: PHYSICIAN ASSISTANT

## 2019-09-11 PROCEDURE — 76705 ECHO EXAM OF ABDOMEN: CPT

## 2019-09-11 PROCEDURE — 99222 1ST HOSP IP/OBS MODERATE 55: CPT | Performed by: INTERNAL MEDICINE

## 2019-09-11 PROCEDURE — 80053 COMPREHEN METABOLIC PANEL: CPT | Performed by: PHYSICIAN ASSISTANT

## 2019-09-11 PROCEDURE — 85027 COMPLETE CBC AUTOMATED: CPT | Performed by: PHYSICIAN ASSISTANT

## 2019-09-11 PROCEDURE — 99232 SBSQ HOSP IP/OBS MODERATE 35: CPT | Performed by: PHYSICIAN ASSISTANT

## 2019-09-11 RX ADMIN — CIPROFLOXACIN 1 DROP: 3 SOLUTION OPHTHALMIC at 08:07

## 2019-09-11 RX ADMIN — CIPROFLOXACIN 1 DROP: 3 SOLUTION OPHTHALMIC at 12:25

## 2019-09-11 RX ADMIN — ENOXAPARIN SODIUM 40 MG: 40 INJECTION SUBCUTANEOUS at 08:07

## 2019-09-11 RX ADMIN — CEFTRIAXONE 2000 MG: 2 INJECTION, SOLUTION INTRAVENOUS at 13:48

## 2019-09-11 RX ADMIN — HYDROMORPHONE HYDROCHLORIDE 0.5 MG: 1 INJECTION, SOLUTION INTRAMUSCULAR; INTRAVENOUS; SUBCUTANEOUS at 17:31

## 2019-09-11 RX ADMIN — SODIUM CHLORIDE 100 ML/HR: 0.9 INJECTION, SOLUTION INTRAVENOUS at 10:57

## 2019-09-11 RX ADMIN — CIPROFLOXACIN 1 DROP: 3 SOLUTION OPHTHALMIC at 10:27

## 2019-09-11 RX ADMIN — CIPROFLOXACIN 1 DROP: 3 SOLUTION OPHTHALMIC at 06:02

## 2019-09-11 RX ADMIN — HYDROMORPHONE HYDROCHLORIDE 0.5 MG: 1 INJECTION, SOLUTION INTRAMUSCULAR; INTRAVENOUS; SUBCUTANEOUS at 10:57

## 2019-09-11 RX ADMIN — ACETAMINOPHEN 650 MG: 325 TABLET, FILM COATED ORAL at 22:33

## 2019-09-11 RX ADMIN — SODIUM CHLORIDE 100 ML/HR: 0.9 INJECTION, SOLUTION INTRAVENOUS at 19:15

## 2019-09-11 RX ADMIN — HYDROMORPHONE HYDROCHLORIDE 0.5 MG: 1 INJECTION, SOLUTION INTRAMUSCULAR; INTRAVENOUS; SUBCUTANEOUS at 00:09

## 2019-09-11 RX ADMIN — CIPROFLOXACIN 1 DROP: 3 SOLUTION OPHTHALMIC at 13:50

## 2019-09-11 RX ADMIN — HYDROMORPHONE HYDROCHLORIDE 0.5 MG: 1 INJECTION, SOLUTION INTRAMUSCULAR; INTRAVENOUS; SUBCUTANEOUS at 04:04

## 2019-09-11 NOTE — ASSESSMENT & PLAN NOTE
CT abdomen and pelvis on admission :   IMPRESSION:  1  No urinary tract calculi  2   Bilateral hydronephrosis, mild on the right and moderate on the left, developing since a sonogram from 10/7/2016  Will follow urinary retention protocol  Check post void residuals  Strain all urine - no radiopaque stones noted on CT scan but new hydronephrosis with flank pain and UTI will need close monitoring for possible stone     Urology consultation appreciated- outpatient follow-up in office

## 2019-09-11 NOTE — PROGRESS NOTES
Progress Note - Lawanna Curling 1965, 52 y o  female MRN: [id number]    Unit/Bed#: 410-01 Encounter: [id number]    Primary Care Provider: Kathia Perez DO   Date and time admitted to hospital: 9/7/2019  4:45 PM        Acute febrile illness  Assessment & Plan  Patient with fevers despite treatment with IV Levaquin and IV aztreonam   Infectious disease consult appreciated  Blood cultures (9/7) no growth at 24 hours- repeat blood cultures (9/10) pending  Urine culture (9/7) negative  Repeat urine culture (9/10) pending      Procalcitonin elevated at 0 47 and continues to trend up at 1 82  Continue to follow  IV Levaquin and IV aztreonam discontinued on 9/10/19 and start on IV rocephin her ID recommendations  ID also recommended to check for: EBV, CMV, Lyme, Ehrlichia- pending    CT abdomen pelvis with contrast:   Bilateral iliac chain and left inguinal lymphadenopathy, nonspecific, possibly infectious/inflammatory, although neoplastic etiologies cannot be excluded  GI consulted given bilirubin elevated from 2 6 to 6 0 now 6 6 today  Patient also now with RUQ/epigastric pain and continues to have nausea with decreased po intake  Question if patient has choledocholithiasis/cholangitis  Consider general surgery consult  * Sepsis ([address] )  Assessment & Plan  Present on admission with and Fever, tachycardia in the setting of abnormal urinalysis suggestive of UTI, and exam and CT findings suggestive of probable early pyelonephritis  Sepsis protocol initiated in the ER including IV fluid hydration and IV Levaquin  Patient continues to have fevers with a temperature of 103 overnight last night (9/9)  Infectious disease consultation appreciated  IV levaquin and IV aztreonam discontinued and switched to IV Rocephin on 9/10/19 per ID recommendations  Blood cultures (9/7) no growth at 24 hours  Repeat blood cultures (9/10/19) pending  Urine culture(9/7) negative   Repeat urine culture (9/10/19) pending  Procalcitonin elevated at 0 47, and trended up to 1 82 yesterday, continue to follow       CT abdomen pelvis with contrast: negative for gallstones or dilated ducts  GI consulted due to elevated bilirubin  Consider general surgery consult for concerns for possible choledocholithiasis/cholangitis with symptoms of RUQ/epigastric pain today and continued nausea, poor PO intake  Hyperbilirubinemia  Assessment & Plan  Bilirubin elevated at 2 0 on admission which was thought to be due to sepsis, however this has trended up to 6 6 today and the patient appears jaundice  The patient is now with RUQ/epigastric pain today with continued nausea/poor po intake  GI consulted  Consider general surgery consult  Acute cystitis without hematuria  Assessment & Plan  UA results consistent with UTI  Hydronephrosis, persistent fever, and flank pain today  Probable early pyelonephritis  He is febrile upon arrival to the ER  IV Levaquin and IV aztreonam discontinued and switched to IV rocephin on 9/10/19 per ID recommendations  Initial blood and urine cultures are negative  Repeat cultures drawn on 9/10- pending  Hyponatremia  Assessment & Plan  A sodium level at 132 on admission , improved to 134 on 9/8, now 129 today     Most likely hypovolemic hyponatremia, patient continues to have decreased PO intake  Will increased the patient's IV normal saline to 100 mL/hr  Elevated d-dimer  Assessment & Plan  Likely in the setting of acute infection / sepsis  D-dimer level at 5,386   CT pe study negative for acute PE  Venous duplex study of the BLE negative for DVT  Hydronephrosis  Assessment & Plan  CT abdomen and pelvis on admission :   IMPRESSION:  1  No urinary tract calculi  2   Bilateral hydronephrosis, mild on the right and moderate on the left, developing since a sonogram from 10/7/2016  Will follow urinary retention protocol  Check post void residuals     Strain all urine - no radiopaque stones noted on CT scan but new hydronephrosis with flank pain and UTI will need close monitoring for possible stone  Urology consultation appreciated- outpatient follow-up in office       VTE Pharmacologic Prophylaxis:   Pharmacologic: Enoxaparin (Lovenox)  Mechanical VTE Prophylaxis in Place: Yes    Patient Centered Rounds: I have performed bedside rounds with nursing staff today  Discussions with Specialists or Other Care Team Provider: nursing, CM, and attending      Time Spent for Care: 20 minutes  More than 50% of total time spent on counseling and coordination of care as described above  Current Length of Stay: 4 day(s)    Current Patient Status: Inpatient   Certification Statement: The patient will continue to require additional inpatient hospital stay due to continued workup for fevers, elevated bilirubin, decreased po intake with GI consult     Discharge Plan: TBD    Code Status: Level 1 - Full Code      Subjective: The patient was seen and examined  The patient continues to feel nauseated with decreased po intake  She is not having RUQ/epigastric abdominal pain  Objective:     Vitals:   Temp (24hrs), Av 7 °F (37 6 °C), Min:98 8 °F (37 1 °C), Max:100 7 °F (38 2 °C)    Temp:  [98 8 °F (37 1 °C)-100 7 °F (38 2 °C)] 100 3 °F (37 9 °C)  HR:  [77-89] 89  Resp:  [16-19] 19  BP: (114-124)/(68-71) 121/69  SpO2:  [96 %-99 %] 96 %  Body mass index is 35 77 kg/m²  Input and Output Summary (last 24 hours): Intake/Output Summary (Last 24 hours) at 2019 1130  Last data filed at 2019 1057  Gross per 24 hour   Intake 4782 92 ml   Output 2600 ml   Net 2182 92 ml       Physical Exam:     Physical Exam   Constitutional: She is oriented to person, place, and time  Vital signs are normal  She is active and cooperative  Cardiovascular: Normal rate and regular rhythm  Pulmonary/Chest: Effort normal and breath sounds normal  She has no wheezes  She has no rhonchi  She has no rales  Abdominal: Soft  Normal appearance  There is tenderness in the right upper quadrant and epigastric area  There is no rigidity and no guarding  Neurological: She is alert and oriented to person, place, and time  No cranial nerve deficit  Skin:   jaundice   Nursing note and vitals reviewed  Additional Data:     Labs:    Results from last 7 days   Lab Units 09/11/19  0517 09/10/19  0517   WBC Thousand/uL 7 28 4 84   HEMOGLOBIN g/dL 10 9* 12 0   HEMATOCRIT % 34 3* 37 9   PLATELETS Thousands/uL 97* 87*   NEUTROS PCT %  --  54   LYMPHS PCT %  --  38   LYMPHO PCT % 21  --    MONOS PCT %  --  6   MONO PCT % 6  --    EOS PCT % 1 1     Results from last 7 days   Lab Units 09/11/19  0517   SODIUM mmol/L 129*   POTASSIUM mmol/L 3 4*   CHLORIDE mmol/L 94*   CO2 mmol/L 27   BUN mg/dL 7   CREATININE mg/dL 0 93   ANION GAP mmol/L 8   CALCIUM mg/dL 8 0*   ALBUMIN g/dL 2 2*   TOTAL BILIRUBIN mg/dL 6 60*   ALK PHOS U/L 260*   ALT U/L 84*   AST U/L 151*   GLUCOSE RANDOM mg/dL 101     Results from last 7 days   Lab Units 09/07/19  1704   INR  1 02             Results from last 7 days   Lab Units 09/10/19  1344 09/08/19  0501 09/07/19  2151 09/07/19  1922 09/07/19  1704   LACTIC ACID mmol/L  --   --   --  1 1 1 2   PROCALCITONIN ng/ml 1 82* 0 67* 0 50*  --  0 47*           * I Have Reviewed All Lab Data Listed Above  * Additional Pertinent Lab Tests Reviewed: All Labs Within Last 24 Hours Reviewed    Imaging:    Imaging Reports Reviewed Today Include: CT abdomen pelvis  Imaging Personally Reviewed by Myself Includes:  none    Recent Cultures (last 7 days):     Results from last 7 days   Lab Units 09/08/19  0742 09/07/19  1704   BLOOD CULTURE   --  No Growth at 48 hrs  No Growth at 48 hrs     URINE CULTURE  <10,000 cfu/ml   --        Last 24 Hours Medication List:     Current Facility-Administered Medications:  acetaminophen 650 mg Oral Q6H PRN Jd Mireles PA-C    cefTRIAXone 2,000 mg Intravenous Q24H Rawleigh Patience STELLA Soares Last Rate: 2,000 mg (09/10/19 1433)   ciprofloxacin 1 drop Right Eye Q2H While awake Eusebio Soares PA-C    enoxaparin 40 mg Subcutaneous Daily Jd Mireles PA-C    HYDROmorphone 0 5 mg Intravenous Q4H PRN Erickson Arndt PA-C    ondansetron 4 mg Intravenous Q6H PRN Jd Mireles PA-C    sodium chloride 100 mL/hr Intravenous Continuous Alix Maria PA-C Last Rate: 100 mL/hr (09/11/19 1057)        Today, Patient Was Seen By: Alix Maria PA-C    ** Please Note: Dictation voice to text software may have been used in the creation of this document   **

## 2019-09-11 NOTE — CONSULTS
Consultation - [facility]  Romy Aguilar 52 y o  female MRN: [id number]  Unit/Bed#: 410-01 Encounter: [id number]        ASSESSMENT/PLAN:   Pyelonephritis  Elevated LFTs    Patient was admitted with nausea, vomiting, abdominal and back pain and found to have hydronephrosis bilaterally on imaging  She met sepsis criteria given fever and tachycardia and was started on antibiotics  Despite her antibiotics her fevers have persisted, T-max 103°, today 100 3  Her procalcitonin is increasing  She continues with poor appetite and nausea  On exam she does have epigastric and right upper quadrant tenderness  No abnormalities were seen on CT scan  Would recommend ultrasound for further evaluation  Her enzymes could be elevated due to cholestasis seen with sepsis however would need to rule out choledocholithiasis/cholangitis has also potential etiology   -continue antibiotics per ID  -follow LFTs  -check right upper quadrant ultrasound      Consults    Reason for Consult / Principal Problem: Sepsis secondary to UTI [facility])    HPI: Romy Aguilar is a 52y o  year old female who presents to the emergency room on 09/07 with fever, back pain, nausea, vomiting and abdominal pain  She met the criteria for sepsis based on tachycardia and fever, no leukocytosis  CT showed bilateral hydronephrosis  She was started on Levaquin  Unfortunately fever persisted and she was started on aztreonam    She did have an elevated D-dimer but Doppler and CT a chest were both negative for embolism  Her fever persisted and she was seen in consultation by Infectious Disease yesterday who felt that pyelonephritis was the source of her sepsis  Urine culture was negative however was obtained after starting antibiotics  Antibiotics were switched to ceftriaxone  LFTs were elevated on admission, AST 69, ALT 63, total bilirubin 2    They have increased since then and it was recommended that repeat CT scan be obtained  This showed the liver was unremarkable and there is no calcified gallstones  It did show enlarged lymph nodes in the pelvis  Currently AST is 151, ALT 84, alkaline phosphatase 260 and total bilirubin 6 6  She states she has a poor appetite  She continues with nausea but no vomiting  She does have some abdominal pain  She denies any prior GI problems before this recent episode  Review of Systems: as per HPI  Review of Systems   All other systems reviewed and are negative  Historical Information   History reviewed  No pertinent past medical history  Past Surgical History:   Procedure Laterality Date    APPENDECTOMY       SECTION      HYSTERECTOMY           Social History   Social History     Substance and Sexual Activity   Alcohol Use Yes    Frequency: Monthly or less    Binge frequency: Never    Comment: occassional     Social History     Substance and Sexual Activity   Drug Use Never     Social History     Tobacco Use   Smoking Status Never Smoker   Smokeless Tobacco Never Used     Family History   Problem Relation Age of Onset    No Known Problems Mother     No Known Problems Sister     No Known Problems Sister     No Known Problems Maternal Aunt     No Known Problems Maternal Aunt     No Known Problems Paternal Aunt     Cancer Paternal Aunt     No Known Problems Paternal Aunt        Meds/Allergies     No medications prior to admission       Current Facility-Administered Medications   Medication Dose Route Frequency    acetaminophen (TYLENOL) tablet 650 mg  650 mg Oral Q6H PRN    cefTRIAXone (ROCEPHIN) IVPB (premix) 2,000 mg  2,000 mg Intravenous Q24H    ciprofloxacin (CILOXAN) 0 3 % ophthalmic solution 1 drop  1 drop Right Eye Q2H While awake    enoxaparin (LOVENOX) subcutaneous injection 40 mg  40 mg Subcutaneous Daily    HYDROmorphone (DILAUDID) injection 0 5 mg  0 5 mg Intravenous Q4H PRN    ondansetron (ZOFRAN) injection 4 mg  4 mg Intravenous Q6H PRN  sodium chloride 0 9 % infusion  100 mL/hr Intravenous Continuous       Allergies   Allergen Reactions    Amoxicillin-Pot Clavulanate Hives, Itching, Wheezing and Facial Swelling    Bee Venom Anaphylaxis    Celecoxib Rash       Objective     Blood pressure 121/69, pulse 89, temperature 100 3 °F (37 9 °C), resp  rate 19, height 5' 8" (1 727 m), weight 107 kg (235 lb 3 7 oz), SpO2 96 %  Intake/Output Summary (Last 24 hours) at 9/11/2019 1054  Last data filed at 9/11/2019 0725  Gross per 24 hour   Intake 180 ml   Output 2600 ml   Net -2420 ml       PHYSICAL EXAM     Physical Exam   Constitutional: She is oriented to person, place, and time  She appears well-developed and well-nourished  Over weight   HENT:   Head: Normocephalic and atraumatic  Eyes: Conjunctivae and EOM are normal    Neck: Normal range of motion  Cardiovascular: Normal rate and regular rhythm  Pulmonary/Chest: Effort normal and breath sounds normal    Abdominal: Soft  Bowel sounds are normal  There is tenderness (Epigastrium and right upper quadrant)  Musculoskeletal: Normal range of motion  Neurological: She is alert and oriented to person, place, and time  Skin: Skin is warm and dry  Jaundice   Psychiatric: She has a normal mood and affect  Her behavior is normal        Lab Results:   CBC:   Lab Results   Component Value Date    WBC 7 28 09/11/2019    HGB 10 9 (L) 09/11/2019    HCT 34 3 (L) 09/11/2019    MCV 86 09/11/2019    PLT 97 (L) 09/11/2019    MCH 27 5 09/11/2019    MCHC 31 8 09/11/2019    RDW 15 2 (H) 09/11/2019    MPV 12 3 09/11/2019    NRBC 0 09/11/2019   ,   CMP:   Lab Results   Component Value Date    K 3 4 (L) 09/11/2019    CL 94 (L) 09/11/2019    CO2 27 09/11/2019    BUN 7 09/11/2019    CREATININE 0 93 09/11/2019    CALCIUM 8 0 (L) 09/11/2019     (H) 09/11/2019    ALT 84 (H) 09/11/2019    ALKPHOS 260 (H) 09/11/2019    EGFR 69 09/11/2019   ,   Imaging Studies: I have personally reviewed pertinent reports  CT renal:  1  No urinary tract calculi  2   Bilateral hydronephrosis, mild on the right and moderate on the left, developing since a sonogram from 10/7/2016  CTA chest:  1  No evidence of pulmonary embolus  2   Subsegmental atelectasis in the right middle lobe and lingula  3   Mild mediastinal and hilar lymphadenopathy  Duplex:  Impression:  RIGHT LOWER LIMB:  No evidence of acute or chronic deep vein thrombosis  No evidence of superficial thrombophlebitis noted  Doppler evaluation shows a normal response to augmentation maneuvers  Popliteal, posterior tibial and anterior tibial arterial Doppler waveforms are  triphasic  LEFT LOWER LIMB:  No evidence of acute or chronic deep vein thrombosis  No evidence of superficial thrombophlebitis noted  Doppler evaluation shows a normal response to augmentation maneuvers  Popliteal, posterior tibial and anterior tibial arterial Doppler waveforms are  triphasic  Tech note: Reactive lymph nodes noted in right and left groin      CT abd/pelvis:  Bilateral iliac chain and left inguinal lymphadenopathy, nonspecific, possibly infectious/inflammatory, although neoplastic etiologies cannot be excluded

## 2019-09-11 NOTE — ASSESSMENT & PLAN NOTE
UA results consistent with UTI  Hydronephrosis, persistent fever, and flank pain today  Probable early pyelonephritis  He is febrile upon arrival to the ER  IV Levaquin and IV aztreonam discontinued and switched to IV rocephin on 9/10/19 per ID recommendations  Initial blood and urine cultures are negative  Repeat cultures drawn on 9/10- pending

## 2019-09-11 NOTE — ASSESSMENT & PLAN NOTE
Patient with fevers despite treatment with IV Levaquin and IV aztreonam   Infectious disease consult appreciated  Blood cultures (9/7) no growth at 24 hours- repeat blood cultures (9/10) pending  Urine culture (9/7) negative  Repeat urine culture (9/10) pending      Procalcitonin elevated at 0 47 and continues to trend up at 1 82  Continue to follow  IV Levaquin and IV aztreonam discontinued on 9/10/19 and start on IV rocephin her ID recommendations  ID also recommended to check for: EBV, CMV, Lyme, Ehrlichia- pending    CT abdomen pelvis with contrast:   Bilateral iliac chain and left inguinal lymphadenopathy, nonspecific, possibly infectious/inflammatory, although neoplastic etiologies cannot be excluded  GI consulted given bilirubin elevated from 2 6 to 6 0 now 6 6 today  Patient also now with RUQ/epigastric pain and continues to have nausea with decreased po intake  Question if patient has choledocholithiasis/cholangitis  Consider general surgery consult

## 2019-09-11 NOTE — ASSESSMENT & PLAN NOTE
A sodium level at 132 on admission , improved to 134 on 9/8, now 129 today     Most likely hypovolemic hyponatremia, patient continues to have decreased PO intake  Will increased the patient's IV normal saline to 100 mL/hr

## 2019-09-11 NOTE — ASSESSMENT & PLAN NOTE
Present on admission with and Fever, tachycardia in the setting of abnormal urinalysis suggestive of UTI, and exam and CT findings suggestive of probable early pyelonephritis  Sepsis protocol initiated in the ER including IV fluid hydration and IV Levaquin  Patient continues to have fevers with a temperature of 103 overnight last night (9/9)  Infectious disease consultation appreciated  IV levaquin and IV aztreonam discontinued and switched to IV Rocephin on 9/10/19 per ID recommendations  Blood cultures (9/7) no growth at 24 hours  Repeat blood cultures (9/10/19) pending  Urine culture(9/7) negative  Repeat urine culture (9/10/19) pending  Procalcitonin elevated at 0 47, and trended up to 1 82 yesterday, continue to follow       CT abdomen pelvis with contrast: negative for gallstones or dilated ducts  GI consulted due to elevated bilirubin  Consider general surgery consult for concerns for possible choledocholithiasis/cholangitis with symptoms of RUQ/epigastric pain today and continued nausea, poor PO intake

## 2019-09-11 NOTE — ASSESSMENT & PLAN NOTE
Bilirubin elevated at 2 0 on admission which was thought to be due to sepsis, however this has trended up to 6 6 today and the patient appears jaundice  The patient is now with RUQ/epigastric pain today with continued nausea/poor po intake  GI consulted  Consider general surgery consult

## 2019-09-12 ENCOUNTER — APPOINTMENT (INPATIENT)
Dept: MRI IMAGING | Facility: HOSPITAL | Age: 54
DRG: 872 | End: 2019-09-12
Payer: COMMERCIAL

## 2019-09-12 LAB
ALBUMIN SERPL BCP-MCNC: 2.1 G/DL (ref 3.5–5)
ALP SERPL-CCNC: 292 U/L (ref 46–116)
ALT SERPL W P-5'-P-CCNC: 93 U/L (ref 12–78)
ANION GAP SERPL CALCULATED.3IONS-SCNC: 8 MMOL/L (ref 4–13)
AST SERPL W P-5'-P-CCNC: 169 U/L (ref 5–45)
B BURGDOR IGG+IGM SER-ACNC: <0.91 ISR (ref 0–0.9)
BASOPHILS # BLD AUTO: 0.06 THOUSANDS/ΜL (ref 0–0.1)
BASOPHILS NFR BLD AUTO: 1 % (ref 0–1)
BILIRUB SERPL-MCNC: 6.3 MG/DL (ref 0.2–1)
BUN SERPL-MCNC: 6 MG/DL (ref 5–25)
CALCIUM SERPL-MCNC: 8.1 MG/DL (ref 8.3–10.1)
CHLORIDE SERPL-SCNC: 97 MMOL/L (ref 100–108)
CO2 SERPL-SCNC: 26 MMOL/L (ref 21–32)
CREAT SERPL-MCNC: 0.86 MG/DL (ref 0.6–1.3)
EBV EA IGG SER-ACNC: <9 U/ML (ref 0–8.9)
EBV NA IGG SER IA-ACNC: <18 U/ML (ref 0–17.9)
EBV PATRN SPEC IB-IMP: ABNORMAL
EBV VCA IGG SER IA-ACNC: <18 U/ML (ref 0–17.9)
EBV VCA IGM SER IA-ACNC: 76 U/ML (ref 0–35.9)
EOSINOPHIL # BLD AUTO: 0.07 THOUSAND/ΜL (ref 0–0.61)
EOSINOPHIL NFR BLD AUTO: 1 % (ref 0–6)
ERYTHROCYTE [DISTWIDTH] IN BLOOD BY AUTOMATED COUNT: 15.3 % (ref 11.6–15.1)
GFR SERPL CREATININE-BSD FRML MDRD: 76 ML/MIN/1.73SQ M
GLUCOSE SERPL-MCNC: 101 MG/DL (ref 65–140)
HCT VFR BLD AUTO: 34.8 % (ref 34.8–46.1)
HGB BLD-MCNC: 11.2 G/DL (ref 11.5–15.4)
IMM GRANULOCYTES # BLD AUTO: 0.1 THOUSAND/UL (ref 0–0.2)
IMM GRANULOCYTES NFR BLD AUTO: 1 % (ref 0–2)
LYMPHOCYTES # BLD AUTO: 4.61 THOUSANDS/ΜL (ref 0.6–4.47)
LYMPHOCYTES NFR BLD AUTO: 55 % (ref 14–44)
MCH RBC QN AUTO: 27.4 PG (ref 26.8–34.3)
MCHC RBC AUTO-ENTMCNC: 32.2 G/DL (ref 31.4–37.4)
MCV RBC AUTO: 85 FL (ref 82–98)
MONOCYTES # BLD AUTO: 0.37 THOUSAND/ΜL (ref 0.17–1.22)
MONOCYTES NFR BLD AUTO: 4 % (ref 4–12)
NEUTROPHILS # BLD AUTO: 3.15 THOUSANDS/ΜL (ref 1.85–7.62)
NEUTS SEG NFR BLD AUTO: 38 % (ref 43–75)
NRBC BLD AUTO-RTO: 0 /100 WBCS
PLATELET # BLD AUTO: 119 THOUSANDS/UL (ref 149–390)
PMV BLD AUTO: 13 FL (ref 8.9–12.7)
POTASSIUM SERPL-SCNC: 3.3 MMOL/L (ref 3.5–5.3)
PROCALCITONIN SERPL-MCNC: 1.21 NG/ML
PROT SERPL-MCNC: 6.3 G/DL (ref 6.4–8.2)
RBC # BLD AUTO: 4.09 MILLION/UL (ref 3.81–5.12)
SODIUM SERPL-SCNC: 131 MMOL/L (ref 136–145)
WBC # BLD AUTO: 8.36 THOUSAND/UL (ref 4.31–10.16)

## 2019-09-12 PROCEDURE — 80053 COMPREHEN METABOLIC PANEL: CPT | Performed by: PHYSICIAN ASSISTANT

## 2019-09-12 PROCEDURE — 76376 3D RENDER W/INTRP POSTPROCES: CPT

## 2019-09-12 PROCEDURE — 84145 PROCALCITONIN (PCT): CPT | Performed by: PHYSICIAN ASSISTANT

## 2019-09-12 PROCEDURE — 74181 MRI ABDOMEN W/O CONTRAST: CPT

## 2019-09-12 PROCEDURE — 99232 SBSQ HOSP IP/OBS MODERATE 35: CPT | Performed by: PHYSICIAN ASSISTANT

## 2019-09-12 PROCEDURE — 99253 IP/OBS CNSLTJ NEW/EST LOW 45: CPT | Performed by: SURGERY

## 2019-09-12 PROCEDURE — 85025 COMPLETE CBC W/AUTO DIFF WBC: CPT | Performed by: PHYSICIAN ASSISTANT

## 2019-09-12 RX ADMIN — SODIUM CHLORIDE 100 ML/HR: 0.9 INJECTION, SOLUTION INTRAVENOUS at 05:14

## 2019-09-12 RX ADMIN — METRONIDAZOLE 500 MG: 500 INJECTION, SOLUTION INTRAVENOUS at 19:42

## 2019-09-12 RX ADMIN — METRONIDAZOLE 500 MG: 500 INJECTION, SOLUTION INTRAVENOUS at 12:27

## 2019-09-12 RX ADMIN — ACETAMINOPHEN 650 MG: 325 TABLET, FILM COATED ORAL at 15:49

## 2019-09-12 RX ADMIN — HYDROMORPHONE HYDROCHLORIDE 0.5 MG: 1 INJECTION, SOLUTION INTRAMUSCULAR; INTRAVENOUS; SUBCUTANEOUS at 05:14

## 2019-09-12 RX ADMIN — CEFTRIAXONE 2000 MG: 2 INJECTION, SOLUTION INTRAVENOUS at 13:41

## 2019-09-12 RX ADMIN — HYDROMORPHONE HYDROCHLORIDE 0.5 MG: 1 INJECTION, SOLUTION INTRAMUSCULAR; INTRAVENOUS; SUBCUTANEOUS at 10:23

## 2019-09-12 RX ADMIN — ENOXAPARIN SODIUM 40 MG: 40 INJECTION SUBCUTANEOUS at 08:17

## 2019-09-12 RX ADMIN — HYDROMORPHONE HYDROCHLORIDE 0.5 MG: 1 INJECTION, SOLUTION INTRAMUSCULAR; INTRAVENOUS; SUBCUTANEOUS at 20:59

## 2019-09-12 RX ADMIN — HYDROMORPHONE HYDROCHLORIDE 0.5 MG: 1 INJECTION, SOLUTION INTRAMUSCULAR; INTRAVENOUS; SUBCUTANEOUS at 00:19

## 2019-09-12 RX ADMIN — ONDANSETRON 4 MG: 2 INJECTION INTRAMUSCULAR; INTRAVENOUS at 10:23

## 2019-09-12 RX ADMIN — HYDROMORPHONE HYDROCHLORIDE 0.5 MG: 1 INJECTION, SOLUTION INTRAMUSCULAR; INTRAVENOUS; SUBCUTANEOUS at 15:49

## 2019-09-12 NOTE — ASSESSMENT & PLAN NOTE
CT abdomen and pelvis on admission :   IMPRESSION:  1  No urinary tract calculi  2   Bilateral hydronephrosis, mild on the right and moderate on the left, developing since a sonogram from 10/7/2016  Will follow urinary retention protocol     Check post void residuals - normal   Urology consultation appreciated- outpatient follow-up in office

## 2019-09-12 NOTE — ASSESSMENT & PLAN NOTE
UA results consistent with UTI  Hydronephrosis, persistent fever, and flank pain today  Probable early pyelonephritis  He is febrile upon arrival to the ER  IV Levaquin and IV aztreonam discontinued and switched to IV rocephin on 9/10/19 per ID, IV flagyl added today per ID recommendations  Initial blood and urine cultures are negative  Repeat cultures drawn on 9/10 - pending

## 2019-09-12 NOTE — PROGRESS NOTES
Progress Note - Raghu Kapoor 1965, 52 y o  female MRN: [id number]    Unit/Bed#: 410-01 Encounter: [id number]    Primary Care Provider: Marinus Eisenmenger, DO   Date and time admitted to hospital: 9/7/2019  4:45 PM        * Sepsis [facility])  Assessment & Plan  Present on admission with and Fever, tachycardia in the setting of abnormal urinalysis suggestive of UTI, however urine culture was negative  Now looking for new source of infection  Sepsis protocol initiated in the ER including IV fluid hydration and IV Levaquin  Patient continues to have fevers with a temperature of 103 overnight last night (9/9)  Infectious disease consultation appreciated  IV levaquin and IV aztreonam discontinued and switched to IV Rocephin on 9/10/19 per ID recommendations  IV flagyl added today  Blood cultures (9/7) no growth at 24 hours  Repeat blood cultures (9/10/19) pending  Urine culture (9/7) negative  Repeat urine culture (9/10/19) pending  Procalcitonin elevated at 0 47, and trended up to 1 82 yesterday, now 1 2 today  Continue to follow  CT abdomen pelvis with contrast: negative for gallstones or dilated ducts  RUQ Abdominal ultrasound: IMPRESSION:  1  Mild hepatomegaly  2  Gallbladder wall thickening in a decompressed gallbladder  No gallstones  Positive sonographic Tijerina sign  HIDA scan would be useful for cholecystitis diagnosis  General surgery consult for concerns for possible choledocholithiasis/cholangitis  MRCP is pending at this time  Hydronephrosis  Assessment & Plan  CT abdomen and pelvis on admission :   IMPRESSION:  1  No urinary tract calculi  2   Bilateral hydronephrosis, mild on the right and moderate on the left, developing since a sonogram from 10/7/2016  Will follow urinary retention protocol     Check post void residuals - normal   Urology consultation appreciated- outpatient follow-up in office       Hyperbilirubinemia  Assessment & Plan  Bilirubin elevated at 2 0 on admission which was thought to be due to sepsis, however this has trended up to 6 3 today and the patient appears jaundice  The patient is now with RUQ/epigastric pain today with continued nausea/poor po intake  GI consult appreciated  General surgery consult appreciated  MRCP pending  Acute febrile illness  Assessment & Plan  Patient with fevers despite treatment with IV Levaquin and IV aztreonam   Infectious disease consult appreciated  Blood cultures (9/7) no growth at 24 hours- repeat blood cultures (9/10) pending  Urine culture (9/7) negative  Repeat urine culture (9/10) pending  Procalcitonin elevated at 0 47 and continues to trend up at 1 82  Continue to follow  IV Levaquin and IV aztreonam discontinued on 9/10/19 and start on IV rocephin her ID recommendations  IV flagyl added today  ID also recommended to check for:   EBV - positive IgM- possible acute infection  CMV - pending  Lyme - negative  Ehrlichia - pending    CT abdomen pelvis with contrast:   Bilateral iliac chain and left inguinal lymphadenopathy, nonspecific, possibly infectious/inflammatory, although neoplastic etiologies cannot be excluded  GI consulted given bilirubin elevated from 2 6 now 6 3 today  Patient also now with RUQ/epigastric pain and continues to have nausea with decreased po intake  General surgery consult appreciated - MRCP is pending  Consider HIDA pending read  Acute cystitis without hematuria  Assessment & Plan  UA results consistent with UTI  Hydronephrosis, persistent fever, and flank pain today  Probable early pyelonephritis  He is febrile upon arrival to the ER  IV Levaquin and IV aztreonam discontinued and switched to IV rocephin on 9/10/19 per ID, IV flagyl added today per ID recommendations  Initial blood and urine cultures are negative  Repeat cultures drawn on 9/10 - pending         Hyponatremia  Assessment & Plan  A sodium level at 132 on admission , improved to 134 on 9/8, now 129 today  Most likely hypovolemic hyponatremia, patient continues to have decreased PO intake  Will increased the patient's IV normal saline to 100 mL/hr  Elevated d-dimer  Assessment & Plan  Likely in the setting of acute infection / sepsis  D-dimer level at 5,386   CT pe study negative for acute PE  Venous duplex study of the BLE negative for DVT  VTE Pharmacologic Prophylaxis:   Pharmacologic: Enoxaparin (Lovenox)  Mechanical VTE Prophylaxis in Place: Yes      Time Spent for Care: 30 minutes  More than 50% of total time spent on counseling and coordination of care as described above  Current Length of Stay: 5 day(s)    Current Patient Status: Inpatient   Certification Statement: The patient will continue to require additional inpatient hospital stay due to need for IV antibiotics, further inpatient imaging  Discharge Plan / Estimated Discharge Date: TBD      Code Status: Level 1 - Full Code      Subjective:   Patient is feeling weak and still having recurrent fevers  Noticed urine is a brown color  Still with intermittent nausea and abdominal discomfort  Notes fatigue and a sore throat today  Objective:   Vitals:   Temp (24hrs), Av 4 °F (38 °C), Min:98 2 °F (36 8 °C), Max:103 2 °F (39 6 °C)    Temp:  [98 2 °F (36 8 °C)-103 2 °F (39 6 °C)] 101 8 °F (38 8 °C)  HR:  [83-96] 88  Resp:  [12-18] 12  BP: (123-142)/(66-75) 135/66  SpO2:  [92 %-98 %] 95 %  Body mass index is 36 2 kg/m²  Input and Output Summary (last 24 hours): Intake/Output Summary (Last 24 hours) at 2019 1728  Last data filed at 2019 1119  Gross per 24 hour   Intake 300 ml   Output 1900 ml   Net -1600 ml       Physical Exam:     Physical Exam   Constitutional: She is oriented to person, place, and time  She appears well-developed and well-nourished  No distress  HENT:   Head: Normocephalic  Mouth/Throat: Oropharynx is clear and moist    Neck: Neck supple     Cardiovascular: Normal rate and normal heart sounds  No murmur heard  Pulmonary/Chest: Effort normal and breath sounds normal  No respiratory distress  Abdominal: Soft  Bowel sounds are normal  There is tenderness (generalized tenderness)  There is no guarding  Musculoskeletal: She exhibits no edema  Neurological: She is alert and oriented to person, place, and time  Skin: Skin is warm  She is not diaphoretic  Jaundice is present   Psychiatric: She has a normal mood and affect  Nursing note and vitals reviewed  Additional Data:   Labs:  Results from last 7 days   Lab Units 09/12/19  0524   WBC Thousand/uL 8 36   HEMOGLOBIN g/dL 11 2*   HEMATOCRIT % 34 8   PLATELETS Thousands/uL 119*   NEUTROS PCT % 38*   LYMPHS PCT % 55*   MONOS PCT % 4   EOS PCT % 1     Results from last 7 days   Lab Units 09/12/19  0524   POTASSIUM mmol/L 3 3*   CHLORIDE mmol/L 97*   CO2 mmol/L 26   BUN mg/dL 6   CREATININE mg/dL 0 86   CALCIUM mg/dL 8 1*   ALK PHOS U/L 292*   ALT U/L 93*   AST U/L 169*     Results from last 7 days   Lab Units 09/07/19  1704   INR  1 02       * I Have Reviewed All Lab Data Listed Above  * Additional Pertinent Lab Tests Reviewed: All Labs Within Last 24 Hours Reviewed    Imaging:  Imaging Reports Reviewed Today Include: RUQ ultrasound reviewed  Recent Cultures (last 7 days):     Results from last 7 days   Lab Units 09/10/19  1343 09/10/19  1210 09/08/19  0742 09/07/19  1704   BLOOD CULTURE  No Growth at 24 hrs  No Growth at 24 hrs   --   --  No Growth After 4 Days  No Growth After 4 Days     URINE CULTURE   --  No Growth <1000 cfu/mL <10,000 cfu/ml   --        Last 24 Hours Medication List:     Current Facility-Administered Medications:  acetaminophen 650 mg Oral Q6H PRN Jd Mireles PA-C    cefTRIAXone 2,000 mg Intravenous Q24H Lady Vega PA-C Last Rate: 2,000 mg (09/12/19 1341)   enoxaparin 40 mg Subcutaneous Daily Jd Mireles PA-C    HYDROmorphone 0 5 mg Intravenous Q4H PRN Ora Pichardo PA-C metroNIDAZOLE 500 mg Intravenous Q8H Erickson Arndt PA-C Last Rate: 500 mg (09/12/19 1227)   ondansetron 4 mg Intravenous Q6H PRN Jd Mireles PA-C    sodium chloride 100 mL/hr Intravenous Continuous Diana Petit PA-C Last Rate: 100 mL/hr (09/12/19 0612)        Today, Patient Was Seen By: Oren Ray PA-C    ** Please Note: Dragon 360 Dictation voice to text software may have been used in the creation of this document   **

## 2019-09-12 NOTE — PLAN OF CARE
Problem: PAIN - ADULT  Goal: Verbalizes/displays adequate comfort level or baseline comfort level  Description  Interventions:  - Encourage patient to monitor pain and request assistance  - Assess pain using appropriate pain scale; 0-10 pain scale  - Administer analgesics based on type and severity of pain and evaluate response  - Implement non-pharmacological measures as appropriate and evaluate response  - Consider cultural and social influences on pain and pain management  - Notify physician/advanced practitioner if interventions unsuccessful or patient reports new pain   Outcome: Progressing     Problem: INFECTION - ADULT  Goal: Absence or prevention of progression during hospitalization  Description  INTERVENTIONS:  - Assess and monitor for signs and symptoms of infection  - Monitor lab/diagnostic results  - Monitor all insertion sites, i e  indwelling lines, tubes, and drains  - [place] appropriate cooling/warming therapies per order  - Administer medications as ordered  - Instruct and encourage patient and family to use good hand hygiene technique   Outcome: Progressing     Problem: SAFETY ADULT  Goal: Patient will remain free of falls  Description  INTERVENTIONS:  - Assess patient frequently for physical needs  -  Identify cognitive and physical deficits and behaviors that affect risk of falls  -  [place] fall precautions as indicated by assessment   Low fall risk   - Educate patient/family on patient safety including physical limitations  - Instruct patient to call for assistance with activity based on assessment  - Modify environment to reduce risk of injury  - Consider OT/PT consult to assist with strengthening/mobility   Outcome: Progressing  Goal: Maintain or return mobility status to optimal level  Description  INTERVENTIONS:  - Assess patient's baseline mobility status -[facility] fall precautions as indicated by assessment -Low fall risk   - Record patient progress and toleration of activity level on Mobility SBAR; progress patient to next Phase/Stage  - Instruct patient to call for assistance with activity based on assessment  - Consider rehabilitation consult to assist with strengthening/weightbearing, etc     Outcome: Progressing  Goal: Maintain or return to baseline ADL function  Description  INTERVENTIONS:  -  Assess patient's ability to carry out ADLs; Independent  - Assess patient's mobility; Independent  - Encourage maximum independence but intervene and supervise when necessary  - Involve family in performance of ADLs  - Assess for home care needs following discharge   - Consider OT consult to assist with ADL evaluation and planning for discharge  - Provide patient education as appropriate   Outcome: Progressing     Problem: DISCHARGE PLANNING  Goal: Discharge to home or other facility with appropriate resources  Description  INTERVENTIONS:  - Identify barriers to discharge w/patient and caregiver  - Arrange for needed discharge resources and transportation as appropriate  - Identify discharge learning needs (meds, wound care, etc )  - Refer to Case Management Department for coordinating discharge planning if the patient needs post-hospital services based on physician/advanced practitioner order or complex needs related to functional status, cognitive ability, or social support system   Outcome: Progressing     Problem: Knowledge Deficit  Goal: Patient/family/caregiver demonstrates understanding of disease process, treatment plan, medications, and discharge instructions  Description  Complete learning assessment and assess knowledge base    Interventions:  - Provide teaching at level of understanding  - Provide teaching via preferred learning methods  Outcome: Progressing     Problem: GENITOURINARY - ADULT  Goal: Maintains or returns to baseline urinary function  Description  INTERVENTIONS:  - Assess urinary function  - Encourage oral fluids to ensure adequate hydration if ordered  - Administer IV fluids as ordered to ensure adequate hydration  - Administer ordered medications as needed  - Offer frequent toileting  - Follow urinary retention protocol - post void residuals every shift   Outcome: Progressing     Problem: METABOLIC, FLUID AND ELECTROLYTES - ADULT  Goal: Electrolytes maintained within normal limits  Description  INTERVENTIONS:  - Monitor labs and assess patient for signs and symptoms of electrolyte imbalances  - Administer electrolyte replacement as ordered  - Monitor response to electrolyte replacements, including repeat lab results as appropriate  - Instruct patient on fluid and nutrition as appropriate  Outcome: Progressing     Problem: DISCHARGE PLANNING - CARE MANAGEMENT  Goal: Discharge to post-acute care or home with appropriate resources  Description  INTERVENTIONS:  - Conduct assessment to determine patient/family and health care team treatment goals, and need for post-acute services based on payer coverage, community resources, and patient preferences, and barriers to discharge  - Address psychosocial, clinical, and financial barriers to discharge as identified in assessment in conjunction with the patient/family and health care team  - Arrange appropriate level of post-acute services according to patient's   needs and preference and payer coverage in collaboration with the physician and health care team  - Communicate with and update the patient/family, physician, and health care team regarding progress on the discharge plan  - Arrange appropriate transportation to post-acute venues    Pt's goal is to return with spouse   Outcome: Progressing     Problem: Potential for Falls  Goal: Patient will remain free of falls  Description  INTERVENTIONS:  - Assess patient frequently for physical needs  -  Identify cognitive and physical deficits and behaviors that affect risk of falls  -  [place] fall precautions as indicated by assessment   Low fall risk   - Educate patient/family on patient safety including physical limitations  - Instruct patient to call for assistance with activity based on assessment  - Modify environment to reduce risk of injury  - Consider OT/PT consult to assist with strengthening/mobility   Outcome: Progressing

## 2019-09-12 NOTE — ASSESSMENT & PLAN NOTE
Present on admission with and Fever, tachycardia in the setting of abnormal urinalysis suggestive of UTI, however urine culture was negative  Now looking for new source of infection  Sepsis protocol initiated in the ER including IV fluid hydration and IV Levaquin  Patient continues to have fevers with a temperature of 103 overnight last night (9/9)  Infectious disease consultation appreciated  IV levaquin and IV aztreonam discontinued and switched to IV Rocephin on 9/10/19 per ID recommendations  IV flagyl added today  Blood cultures (9/7) no growth at 24 hours  Repeat blood cultures (9/10/19) pending  Urine culture (9/7) negative  Repeat urine culture (9/10/19) pending  Procalcitonin elevated at 0 47, and trended up to 1 82 yesterday, now 1 2 today  Continue to follow  CT abdomen pelvis with contrast: negative for gallstones or dilated ducts  RUQ Abdominal ultrasound: IMPRESSION:  1  Mild hepatomegaly  2  Gallbladder wall thickening in a decompressed gallbladder  No gallstones  Positive sonographic Tijerina sign  HIDA scan would be useful for cholecystitis diagnosis  General surgery consult for concerns for possible choledocholithiasis/cholangitis  MRCP is pending at this time

## 2019-09-12 NOTE — ASSESSMENT & PLAN NOTE
Bilirubin elevated at 2 0 on admission which was thought to be due to sepsis, however this has trended up to 6 3 today and the patient appears jaundice  The patient is now with RUQ/epigastric pain today with continued nausea/poor po intake  GI consult appreciated  General surgery consult appreciated  MRCP pending

## 2019-09-12 NOTE — CONSULTS
Consultation - General Surgery   Azalea Thompson 52 y o  female MRN: [id number]  Unit/Bed#: 410-01 Encounter: [id number]    Assessment/Plan     Assessment:  RUQ abdominal pain, imaging of RUQ without evidence of dilated ducts or gallstones  Worsening LFTs  Hyperbilrubinemia  Admitted with sepsis d/t UTI  Bilateral hydronephrosis    Plan: Discussed with Dr Jamin Boggs via [employer]  NPO  MRCP today  Follow LFTs  Continue antibiotics per primary  Medicate PRN pain and nausea  Avoid hepatotoxic medications  IVF hydration  Monitor I/Os    History of Present Illness    HPI:  Raghu Kapoor is a 52 y o  female who general surgery has been consulted due to increasing right upper quadrant abdominal pain and nausea  She initially presented to the [facility] ED on Patient presented to the [facility] ED on 9/7/2019 with complaints of N/V, abdominal and back pain  She was found to have bilateral hydronephrosis and was septic secondary to a UTI  She has been admitted since that time  She is now experiencing more severe RUQ abdominal pain, anorexia and nausea  LFTs are elevated  A RUQ U/S on 9/11 revealed mild hepatomegaly, Gallbladder wall thickening in a decompressed gallbladder  No gallstones  Positive sonographic Tijerina sign  CT Abd/Pelvis on 9/10 revealed bilateral iliac chain and left inguinal lymphadenopathy, nonspecific, possibly infectious/inflammatory, although neoplastic etiologies cannot be excluded  Blood cultures and urine cultures have been negative  She is passing gas and moving her bowels  She denies urinary complaints at this time  Denies diffuse itching  Inpatient consult to Acute Care Surgery  Consult performed by: Xander Chand PA-C  Consult ordered by: Susi Sanchez PA-C        Review of Systems   Constitutional: Positive for appetite change and fatigue  Gastrointestinal: Positive for abdominal pain and nausea  Historical Information   History reviewed  No pertinent past medical history    Past Surgical History:   Procedure Laterality Date    APPENDECTOMY       SECTION      HYSTERECTOMY           Social History   Social History     Substance and Sexual Activity   Alcohol Use Yes    Frequency: Monthly or less    Binge frequency: Never    Comment: occassional     Social History     Substance and Sexual Activity   Drug Use Never     Social History     Tobacco Use   Smoking Status Never Smoker   Smokeless Tobacco Never Used     Family History: Aunt  with unknown form of cancer  No other pertinent family history      Meds/Allergies      current meds:   Current Facility-Administered Medications   Medication Dose Route Frequency    acetaminophen (TYLENOL) tablet 650 mg  650 mg Oral Q6H PRN    cefTRIAXone (ROCEPHIN) IVPB (premix) 2,000 mg  2,000 mg Intravenous Q24H    enoxaparin (LOVENOX) subcutaneous injection 40 mg  40 mg Subcutaneous Daily    HYDROmorphone (DILAUDID) injection 0 5 mg  0 5 mg Intravenous Q4H PRN    metroNIDAZOLE (FLAGYL) IVPB (premix) 500 mg  500 mg Intravenous Q8H    ondansetron (ZOFRAN) injection 4 mg  4 mg Intravenous Q6H PRN    sodium chloride 0 9 % infusion  100 mL/hr Intravenous Continuous     Allergies   Allergen Reactions    Amoxicillin-Pot Clavulanate Hives, Itching, Wheezing and Facial Swelling    Bee Venom Anaphylaxis    Celecoxib Rash       Objective   First Vitals:   Blood Pressure: 127/79 (19)  Pulse: (!) 108 (19)  Temperature: (!) 100 9 °F (38 3 °C) (19)  Temp Source: Oral (19)  Respirations: 20 (19)  Height: 5' 8" (172 7 cm) (19)  Weight - Scale: 99 7 kg (219 lb 12 8 oz) (19)  SpO2: 96 % (19)    Current Vitals:   Blood Pressure: 141/75 (19)  Pulse: 89 (19)  Temperature: 99 7 °F (37 6 °C) (19)  Temp Source: Temporal (09/10/19 1715)  Respirations: 17 (19)  Height: 5' 8" (172 7 cm) (19)  Weight - Scale: 108 kg (238 lb 1 6 oz) (09/12/19 0600)  SpO2: 98 % (09/12/19 1119)      Intake/Output Summary (Last 24 hours) at 9/12/2019 1140  Last data filed at 9/12/2019 0846  Gross per 24 hour   Intake 300 ml   Output 1850 ml   Net -1550 ml       Invasive Devices     Peripheral Intravenous Line            Peripheral IV 09/10/19 Proximal;Right;Ventral (anterior) Forearm 2 days                Physical Exam   Constitutional: She is oriented to person, place, and time  She appears well-developed and well-nourished  HENT:   Head: Normocephalic and atraumatic  Oropharynx dry   Eyes: Pupils are equal, round, and reactive to light  EOM are normal  Scleral icterus is present  Neck: Normal range of motion  Neck supple  No tracheal deviation present  No thyromegaly present  Cardiovascular: Normal rate, regular rhythm, normal heart sounds and intact distal pulses  Pulmonary/Chest: Effort normal and breath sounds normal  No respiratory distress  Abdominal: Soft  Bowel sounds are normal  She exhibits no distension and no mass  There is no rebound and no guarding  No hernia  RUQ tenderness, postitive Tijerina's sign   Musculoskeletal: Normal range of motion  She exhibits no edema  Neurological: She is alert and oriented to person, place, and time  Skin: Skin is warm and dry  Capillary refill takes less than 2 seconds  She is not diaphoretic  No erythema  Psychiatric: She has a normal mood and affect  Her behavior is normal  Judgment and thought content normal        Lab Results:    I have personally reviewed pertinent lab results    , CBC:   Lab Results   Component Value Date    WBC 8 36 09/12/2019    HGB 11 2 (L) 09/12/2019    HCT 34 8 09/12/2019    MCV 85 09/12/2019     (L) 09/12/2019    MCH 27 4 09/12/2019    MCHC 32 2 09/12/2019    RDW 15 3 (H) 09/12/2019    MPV 13 0 (H) 09/12/2019    NRBC 0 09/12/2019   , CMP:   Lab Results   Component Value Date    SODIUM 131 (L) 09/12/2019    K 3 3 (L) 09/12/2019    CL 97 (L) 09/12/2019    CO2 26 09/12/2019    BUN 6 09/12/2019    CREATININE 0 86 09/12/2019    CALCIUM 8 1 (L) 09/12/2019     (H) 09/12/2019    ALT 93 (H) 09/12/2019    ALKPHOS 292 (H) 09/12/2019    EGFR 76 09/12/2019     Imaging:  I have personally reviewed pertinent reports  EKG, Pathology, and Other Studies:  I have personally reviewed pertinent reports  Counseling / Coordination of Care  Total floor / unit time spent today 40 minutes  Greater than 50% of total time was spent with the patient and / or family counseling and / or coordination of care  A description of the counseling / coordination of care: lab/imaging findings, anticipated studies, treatment options      Ting Street PA-C

## 2019-09-12 NOTE — ASSESSMENT & PLAN NOTE
Patient with fevers despite treatment with IV Levaquin and IV aztreonam   Infectious disease consult appreciated  Blood cultures (9/7) no growth at 24 hours- repeat blood cultures (9/10) pending  Urine culture (9/7) negative  Repeat urine culture (9/10) pending  Procalcitonin elevated at 0 47 and continues to trend up at 1 82  Continue to follow  IV Levaquin and IV aztreonam discontinued on 9/10/19 and start on IV rocephin her ID recommendations  IV flagyl added today  ID also recommended to check for:   EBV - positive IgM- possible acute infection  CMV - pending  Lyme - negative  Ehrlichia - pending    CT abdomen pelvis with contrast:   Bilateral iliac chain and left inguinal lymphadenopathy, nonspecific, possibly infectious/inflammatory, although neoplastic etiologies cannot be excluded  GI consulted given bilirubin elevated from 2 6 now 6 3 today  Patient also now with RUQ/epigastric pain and continues to have nausea with decreased po intake  General surgery consult appreciated - MRCP is pending  Consider HIDA pending read

## 2019-09-13 ENCOUNTER — APPOINTMENT (INPATIENT)
Dept: NUCLEAR MEDICINE | Facility: HOSPITAL | Age: 54
DRG: 872 | End: 2019-09-13
Payer: COMMERCIAL

## 2019-09-13 ENCOUNTER — APPOINTMENT (INPATIENT)
Dept: RADIOLOGY | Facility: HOSPITAL | Age: 54
DRG: 872 | End: 2019-09-13
Payer: COMMERCIAL

## 2019-09-13 LAB
A PHAGOCYTOPH IGG TITR SER IF: NEGATIVE {TITER}
A PHAGOCYTOPH IGM TITR SER IF: NEGATIVE {TITER}
ALBUMIN SERPL BCP-MCNC: 2 G/DL (ref 3.5–5)
ALP SERPL-CCNC: 366 U/L (ref 46–116)
ALT SERPL W P-5'-P-CCNC: 95 U/L (ref 12–78)
ANION GAP SERPL CALCULATED.3IONS-SCNC: 9 MMOL/L (ref 4–13)
AST SERPL W P-5'-P-CCNC: 154 U/L (ref 5–45)
BACTERIA BLD CULT: NORMAL
BACTERIA BLD CULT: NORMAL
BASOPHILS # BLD AUTO: 0.06 THOUSANDS/ΜL (ref 0–0.1)
BASOPHILS NFR BLD AUTO: 1 % (ref 0–1)
BILIRUB SERPL-MCNC: 3.6 MG/DL (ref 0.2–1)
BUN SERPL-MCNC: 6 MG/DL (ref 5–25)
CALCIUM SERPL-MCNC: 7.8 MG/DL (ref 8.3–10.1)
CHLORIDE SERPL-SCNC: 98 MMOL/L (ref 100–108)
CO2 SERPL-SCNC: 25 MMOL/L (ref 21–32)
CREAT SERPL-MCNC: 0.83 MG/DL (ref 0.6–1.3)
E CHAFFEENSIS IGG TITR SER IF: NEGATIVE {TITER}
E CHAFFEENSIS IGM TITR SER IF: NEGATIVE {TITER}
EOSINOPHIL # BLD AUTO: 0.07 THOUSAND/ΜL (ref 0–0.61)
EOSINOPHIL NFR BLD AUTO: 1 % (ref 0–6)
ERYTHROCYTE [DISTWIDTH] IN BLOOD BY AUTOMATED COUNT: 15.6 % (ref 11.6–15.1)
GFR SERPL CREATININE-BSD FRML MDRD: 80 ML/MIN/1.73SQ M
GLUCOSE SERPL-MCNC: 89 MG/DL (ref 65–140)
HCT VFR BLD AUTO: 33.9 % (ref 34.8–46.1)
HGB BLD-MCNC: 10.9 G/DL (ref 11.5–15.4)
IMM GRANULOCYTES # BLD AUTO: 0.15 THOUSAND/UL (ref 0–0.2)
IMM GRANULOCYTES NFR BLD AUTO: 2 % (ref 0–2)
LYMPHOCYTES # BLD AUTO: 4.4 THOUSANDS/ΜL (ref 0.6–4.47)
LYMPHOCYTES NFR BLD AUTO: 41 % (ref 14–44)
MCH RBC QN AUTO: 27.2 PG (ref 26.8–34.3)
MCHC RBC AUTO-ENTMCNC: 32.2 G/DL (ref 31.4–37.4)
MCV RBC AUTO: 85 FL (ref 82–98)
MONOCYTES # BLD AUTO: 1.94 THOUSAND/ΜL (ref 0.17–1.22)
MONOCYTES NFR BLD AUTO: 19 % (ref 4–12)
NEUTROPHILS # BLD AUTO: 3.68 THOUSANDS/ΜL (ref 1.85–7.62)
NEUTS SEG NFR BLD AUTO: 36 % (ref 43–75)
NRBC BLD AUTO-RTO: 0 /100 WBCS
PLATELET # BLD AUTO: 179 THOUSANDS/UL (ref 149–390)
PMV BLD AUTO: 11.6 FL (ref 8.9–12.7)
POTASSIUM SERPL-SCNC: 3.3 MMOL/L (ref 3.5–5.3)
PROCALCITONIN SERPL-MCNC: 0.68 NG/ML
PROT SERPL-MCNC: 6.5 G/DL (ref 6.4–8.2)
RBC # BLD AUTO: 4.01 MILLION/UL (ref 3.81–5.12)
SODIUM SERPL-SCNC: 132 MMOL/L (ref 136–145)
WBC # BLD AUTO: 10.3 THOUSAND/UL (ref 4.31–10.16)

## 2019-09-13 PROCEDURE — 99232 SBSQ HOSP IP/OBS MODERATE 35: CPT | Performed by: INTERNAL MEDICINE

## 2019-09-13 PROCEDURE — A9537 TC99M MEBROFENIN: HCPCS

## 2019-09-13 PROCEDURE — 85025 COMPLETE CBC W/AUTO DIFF WBC: CPT | Performed by: PHYSICIAN ASSISTANT

## 2019-09-13 PROCEDURE — 80053 COMPREHEN METABOLIC PANEL: CPT | Performed by: PHYSICIAN ASSISTANT

## 2019-09-13 PROCEDURE — 99232 SBSQ HOSP IP/OBS MODERATE 35: CPT | Performed by: PHYSICIAN ASSISTANT

## 2019-09-13 PROCEDURE — 78227 HEPATOBIL SYST IMAGE W/DRUG: CPT

## 2019-09-13 PROCEDURE — 71045 X-RAY EXAM CHEST 1 VIEW: CPT

## 2019-09-13 PROCEDURE — 84145 PROCALCITONIN (PCT): CPT | Performed by: PHYSICIAN ASSISTANT

## 2019-09-13 PROCEDURE — 99232 SBSQ HOSP IP/OBS MODERATE 35: CPT | Performed by: SURGERY

## 2019-09-13 RX ORDER — MORPHINE SULFATE 4 MG/ML
4 INJECTION, SOLUTION INTRAMUSCULAR; INTRAVENOUS
Status: COMPLETED | OUTPATIENT
Start: 2019-09-13 | End: 2019-09-13

## 2019-09-13 RX ADMIN — CEFTRIAXONE 2000 MG: 2 INJECTION, SOLUTION INTRAVENOUS at 15:01

## 2019-09-13 RX ADMIN — METRONIDAZOLE 500 MG: 500 INJECTION, SOLUTION INTRAVENOUS at 04:01

## 2019-09-13 RX ADMIN — HYDROMORPHONE HYDROCHLORIDE 0.5 MG: 1 INJECTION, SOLUTION INTRAMUSCULAR; INTRAVENOUS; SUBCUTANEOUS at 22:22

## 2019-09-13 RX ADMIN — MORPHINE SULFATE 4 MG: 4 INJECTION INTRAVENOUS at 13:58

## 2019-09-13 RX ADMIN — METRONIDAZOLE 500 MG: 500 INJECTION, SOLUTION INTRAVENOUS at 23:42

## 2019-09-13 RX ADMIN — SODIUM CHLORIDE 100 ML/HR: 0.9 INJECTION, SOLUTION INTRAVENOUS at 04:01

## 2019-09-13 RX ADMIN — METRONIDAZOLE 500 MG: 500 INJECTION, SOLUTION INTRAVENOUS at 17:02

## 2019-09-13 RX ADMIN — HYDROMORPHONE HYDROCHLORIDE 0.5 MG: 1 INJECTION, SOLUTION INTRAMUSCULAR; INTRAVENOUS; SUBCUTANEOUS at 09:52

## 2019-09-13 RX ADMIN — ACETAMINOPHEN 650 MG: 325 TABLET, FILM COATED ORAL at 21:53

## 2019-09-13 RX ADMIN — ENOXAPARIN SODIUM 40 MG: 40 INJECTION SUBCUTANEOUS at 09:52

## 2019-09-13 NOTE — PROGRESS NOTES
Progress Note - Leida Sides 1965, 52 y o  female MRN: [id number]    Unit/Bed#: 410-01 Encounter: [id number]    Primary Care Provider: Anni Caicedo DO   Date and time admitted to hospital: 9/7/2019  4:45 PM        * Sepsis [facility])  Assessment & Plan  Present on admission with and Fever, tachycardia in the setting of abnormal urinalysis suggestive of UTI, however urine culture was negative  Now looking for new source of infection  Sepsis protocol initiated in the ER including IV fluid hydration and IV Levaquin  Patient continues to have fevers with a temperature of 103 overnight last night (9/9)  Infectious disease consultation appreciated  IV Levaquin and IV aztreonam discontinued and switched to IV Rocephin on 9/10/19 per ID recommendations  IV flagyl added 9/12/19  Blood cultures (9/7) no growth at 5 days    Repeat blood cultures (9/10/19) negative x 48 hours  Urine culture (9/7) negative  Repeat urine culture (9/10/19) showing no growth  Procalcitonin trended up initially, but now continues to trend down  CT abdomen pelvis with contrast: negative for gallstones or dilated ducts  RUQ Abdominal ultrasound: IMPRESSION:  1  Mild hepatomegaly  2  Gallbladder wall thickening in a decompressed gallbladder  No gallstones  Positive sonographic Tijerina sign  HIDA scan would be useful for cholecystitis diagnosis  General surgery consult for concerns for possible choledocholithiasis/cholangitis  MRCP: unremarkable  HIDA scan unremarkable  EBV testing positive  Possible atypical presentation of mononucelosis / viral hepatitis? Regardless, patient seems to be improving on antibiotic regimen  Dr aPola Jara is recommending complete 7 days of IV antibiotics, transition to 10 days oral    Outpatient ID follow up soon after discharge  Hydronephrosis  Assessment & Plan  CT abdomen and pelvis on admission :   IMPRESSION:  1  No urinary tract calculi    2   Bilateral hydronephrosis, mild on the right and moderate on the left, developing since a sonogram from 10/7/2016  Will follow urinary retention protocol  Check post void residuals - normal   Urology consultation appreciated- outpatient follow-up in office       Hyperbilirubinemia  Assessment & Plan  Bilirubin elevated at 2 0 on admission which was thought to be due to sepsis, however this has trended up to 6 3 at max, now trending down again  GI consult appreciated  General surgery consult appreciated  MRCP and HIDA unremarkable  Continue to follow CMP daily  Acute febrile illness  Assessment & Plan  Patient with fevers despite treatment with IV Levaquin and IV aztreonam   Infectious disease consult appreciated  Blood cultures (9/7) no growth at 5 days- repeat blood cultures (9/10) negative x 48 hours  Urine culture (9/7) negative  Repeat urine culture (9/10) negative  Procalcitonin elevated at 0 47 initially,  trended up at 1 82, now continues to trend down  IV Levaquin and IV aztreonam discontinued on 9/10/19 and start on IV rocephin her ID recommendations  IV flagyl added today  ID also recommended to check for:   EBV - positive IgM- possible acute infection  CMV - pending  Lyme - negative  Ehrlichia - pending    CT abdomen pelvis with contrast:   Bilateral iliac chain and left inguinal lymphadenopathy, nonspecific, possibly infectious/inflammatory, although neoplastic etiologies cannot be excluded  GI consulted given bilirubin elevated from 2 6 , yesterday 6 3 today now 3 2  Patient also now with RUQ/epigastric pain and continues to have nausea with decreased po intake  General surgery consult appreciated - MRCP is unremarkable,  HIDA scan is negative  Acute cystitis without hematuria  Assessment & Plan  UA results consistent with UTI, however urine cultures are negative x 2  Hydronephrosis, persistent fever, and flank pain thought to be early pyelonephritis      IV Levaquin and IV aztreonam discontinued and switched to IV rocephin on 9/10/19 per ID, IV flagyl added today per ID recommendations  Hyponatremia  Assessment & Plan  A sodium level at 132 on admission  Initially thought most likely hypovolemic hyponatremia, as the patient continues to have decreased PO intake  Remains consistent at 132 today  Will discontinue IV fluids in the setting of possible volume overload  - patient notes weight has increased  SOB and oxygen requiring  Elevated d-dimer  Assessment & Plan  Likely in the setting of acute infection / sepsis  D-dimer level at 5,386   CT pe study negative for acute PE  Venous duplex study of the BLE negative for DVT  VTE Pharmacologic Prophylaxis:   Pharmacologic: Enoxaparin (Lovenox)  Mechanical VTE Prophylaxis in Place: Yes      Time Spent for Care: 30 minutes  More than 50% of total time spent on counseling and coordination of care as described above  Current Length of Stay: 6 day(s)    Current Patient Status: Inpatient   Certification Statement: The patient will continue to require additional inpatient hospital stay due to need for IV antibiotics  Discharge Plan / Estimated Discharge Date: TBD      Code Status: Level 1 - Full Code      Subjective:   Patient is feeling better today  Less abdominal pain, has an appetite  Objective:   Vitals:   Temp (24hrs), Av 2 °F (37 3 °C), Min:98 8 °F (37 1 °C), Max:99 6 °F (37 6 °C)    Temp:  [98 8 °F (37 1 °C)-99 6 °F (37 6 °C)] 99 °F (37 2 °C)  HR:  [79-90] 79  Resp:  [17-18] 18  BP: (127-129)/(71) 127/71  SpO2:  [86 %-98 %] 93 %  Body mass index is 36 31 kg/m²  Input and Output Summary (last 24 hours): Intake/Output Summary (Last 24 hours) at 2019 1908  Last data filed at 2019 1858  Gross per 24 hour   Intake 950 ml   Output 550 ml   Net 400 ml       Physical Exam:   Physical Exam   Constitutional: She is oriented to person, place, and time  She appears well-developed and well-nourished  HENT:   Head: Normocephalic  Mouth/Throat: Oropharynx is clear and moist    Neck: Neck supple  Cardiovascular: Normal rate  Pulmonary/Chest: Effort normal  No respiratory distress  She has no wheezes  She has rales (fine rales at the bilateral bases  )  Abdominal: Soft  Bowel sounds are normal  She exhibits no distension  There is no tenderness  Musculoskeletal: She exhibits no edema  Neurological: She is alert and oriented to person, place, and time  Skin:   Jaundice is improved compared to yesterday  Psychiatric: She has a normal mood and affect  Nursing note and vitals reviewed  Additional Data:     Labs:    Results from last 7 days   Lab Units 09/13/19  0935   WBC Thousand/uL 10 30*   HEMOGLOBIN g/dL 10 9*   HEMATOCRIT % 33 9*   PLATELETS Thousands/uL 179   NEUTROS PCT % 36*   LYMPHS PCT % 41   MONOS PCT % 19*   EOS PCT % 1     Results from last 7 days   Lab Units 09/13/19  0935   POTASSIUM mmol/L 3 3*   CHLORIDE mmol/L 98*   CO2 mmol/L 25   BUN mg/dL 6   CREATININE mg/dL 0 83   CALCIUM mg/dL 7 8*   ALK PHOS U/L 366*   ALT U/L 95*   AST U/L 154*     Results from last 7 days   Lab Units 09/07/19  1704   INR  1 02       * I Have Reviewed All Lab Data Listed Above  * Additional Pertinent Lab Tests Reviewed: All Labs Within Last 24 Hours Reviewed    Imaging:      Imaging Personally Reviewed by Myself Includes:  No new imaging to review  Recent Cultures (last 7 days):     Results from last 7 days   Lab Units 09/10/19  1343 09/10/19  1210 09/08/19  0742 09/07/19  1704   BLOOD CULTURE  No Growth at 48 hrs  No Growth at 48 hrs   --   --  No Growth After 5 Days  No Growth After 5 Days     URINE CULTURE   --  No Growth <1000 cfu/mL <10,000 cfu/ml   --        Last 24 Hours Medication List:     Current Facility-Administered Medications:  acetaminophen 650 mg Oral Q6H PRN Jd Mireles PA-C    cefTRIAXone 2,000 mg Intravenous Q24H Bernabe Smith PA-C Last Rate: Stopped (09/13/19 1534) enoxaparin 40 mg Subcutaneous Daily Jd Mireles PA-C    HYDROmorphone 0 5 mg Intravenous Q4H PRN Rukhsana Pardo PA-C    metroNIDAZOLE 500 mg Intravenous Q8H Erickson Arndt PA-C Last Rate: 500 mg (09/13/19 1702)   ondansetron 4 mg Intravenous Q6H PRN Jd Mireles PA-C         Today, Patient Was Seen By: Rukhsana Pardo PA-C    ** Please Note: Dragon 360 Dictation voice to text software may have been used in the creation of this document   ** 14-Mar-2018

## 2019-09-13 NOTE — ASSESSMENT & PLAN NOTE
Bilirubin elevated at 2 0 on admission which was thought to be due to sepsis, however this has trended up to 6 3 at max, now trending down again  GI consult appreciated  General surgery consult appreciated  MRCP and HIDA unremarkable  Continue to follow CMP daily

## 2019-09-13 NOTE — ASSESSMENT & PLAN NOTE
A sodium level at 132 on admission  Initially thought most likely hypovolemic hyponatremia, as the patient continues to have decreased PO intake  Remains consistent at 132 today  Will discontinue IV fluids in the setting of possible volume overload  - patient notes weight has increased  SOB and oxygen requiring

## 2019-09-13 NOTE — PROGRESS NOTES
Progress Note - Tigist Franco 52 y o  female MRN: [id number]    Unit/Bed#: 410-01 Encounter: [id number]         Assessment/ Plan:  Pyelonephritis  Elevated LFTs     Patient was admitted with nausea, vomiting, abdominal and back pain and found to have hydronephrosis bilaterally on imaging  She met sepsis criteria given fever and tachycardia and was started on antibiotics  Despite her antibiotics her fevers have persisted, T-max 103°, today 99 6  Her procalcitonin is elevated  On admission LFT's elevated, TB max 6 6 -> 3 6  Ultrasound showed mild hepatomegaly, gallbladder wall thickening in a decompressed gallbladder, no stones, positive Tijerina sign  She then underwent MRCP which did not show any ductal dilation or choledocholithiasis  The kings[place] hepatic lymph node was enlarged  She does continue with some abdominal pain however today seems to be more right lower quadrant  HIDA scan is pending  Her enzymes could be elevated due to cholestasis seen with sepsis  EBV IgM was positive however would expect higher elevations in her AST and ALT  No need for endoscopic intervention   -continue antibiotics per ID  -follow LFTs  -follow up on HIDA scan      Subjective:   Pt complaining of mild abdominal discomfort diffusely  Objective:     Vitals: Blood pressure 129/71, pulse 83, temperature 99 6 °F (37 6 °C), resp  rate 18, height 5' 8" (1 727 m), weight 108 kg (238 lb 12 8 oz), SpO2 98 %  ,Body mass index is 36 31 kg/m²  Physical Exam: General appearance: alert and oriented, in no acute distress  Lungs: clear to auscultation bilaterally  Heart: regular rate and rhythm  Abdomen: +BS, soft, mildly TTP in RLQ  Skin: Jaundice     Invasive Devices     Peripheral Intravenous Line            Peripheral IV 09/10/19 Proximal;Right;Ventral (anterior) Forearm 3 days                Lab, Imaging and other studies: I have personally reviewed pertinent reports       CBC:   Lab Results   Component Value Date    WBC 10 30 (H) 09/13/2019    HGB 10 9 (L) 09/13/2019    HCT 33 9 (L) 09/13/2019    MCV 85 09/13/2019     09/13/2019    MCH 27 2 09/13/2019    MCHC 32 2 09/13/2019    RDW 15 6 (H) 09/13/2019    MPV 11 6 09/13/2019    NRBC 0 09/13/2019   ,   CMP:   Lab Results   Component Value Date    K 3 3 (L) 09/13/2019    CL 98 (L) 09/13/2019    CO2 25 09/13/2019    BUN 6 09/13/2019    CREATININE 0 83 09/13/2019    CALCIUM 7 8 (L) 09/13/2019     (H) 09/13/2019    ALT 95 (H) 09/13/2019    ALKPHOS 366 (H) 09/13/2019    EGFR 80 09/13/2019   ,

## 2019-09-13 NOTE — ASSESSMENT & PLAN NOTE
UA results consistent with UTI, however urine cultures are negative x 2  Hydronephrosis, persistent fever, and flank pain thought to be early pyelonephritis  IV Levaquin and IV aztreonam discontinued and switched to IV rocephin on 9/10/19 per ID, IV flagyl added today per ID recommendations

## 2019-09-13 NOTE — ASSESSMENT & PLAN NOTE
Present on admission with and Fever, tachycardia in the setting of abnormal urinalysis suggestive of UTI, however urine culture was negative  Now looking for new source of infection  Sepsis protocol initiated in the ER including IV fluid hydration and IV Levaquin  Patient continues to have fevers with a temperature of 103 overnight last night (9/9)  Infectious disease consultation appreciated  IV Levaquin and IV aztreonam discontinued and switched to IV Rocephin on 9/10/19 per ID recommendations  IV flagyl added 9/12/19  Blood cultures (9/7) no growth at 5 days    Repeat blood cultures (9/10/19) negative x 48 hours  Urine culture (9/7) negative  Repeat urine culture (9/10/19) showing no growth  Procalcitonin trended up initially, but now continues to trend down  CT abdomen pelvis with contrast: negative for gallstones or dilated ducts  RUQ Abdominal ultrasound: IMPRESSION:  1  Mild hepatomegaly  2  Gallbladder wall thickening in a decompressed gallbladder  No gallstones  Positive sonographic Tijerina sign  HIDA scan would be useful for cholecystitis diagnosis  General surgery consult for concerns for possible choledocholithiasis/cholangitis  MRCP: unremarkable  HIDA scan unremarkable  EBV testing positive  Possible atypical presentation of mononucelosis / viral hepatitis? Regardless, patient seems to be improving on antibiotic regimen  Dr Hillis Felty is recommending complete 7 days of IV antibiotics, transition to 10 days oral    Outpatient ID follow up soon after discharge

## 2019-09-13 NOTE — ASSESSMENT & PLAN NOTE
Patient with fevers despite treatment with IV Levaquin and IV aztreonam   Infectious disease consult appreciated  Blood cultures (9/7) no growth at 5 days- repeat blood cultures (9/10) negative x 48 hours  Urine culture (9/7) negative  Repeat urine culture (9/10) negative  Procalcitonin elevated at 0 47 initially,  trended up at 1 82, now continues to trend down  IV Levaquin and IV aztreonam discontinued on 9/10/19 and start on IV rocephin her ID recommendations  IV flagyl added today  ID also recommended to check for:   EBV - positive IgM- possible acute infection  CMV - pending  Lyme - negative  Ehrlichia - pending    CT abdomen pelvis with contrast:   Bilateral iliac chain and left inguinal lymphadenopathy, nonspecific, possibly infectious/inflammatory, although neoplastic etiologies cannot be excluded  GI consulted given bilirubin elevated from 2 6 , yesterday 6 3 today now 3 2  Patient also now with RUQ/epigastric pain and continues to have nausea with decreased po intake  General surgery consult appreciated - MRCP is unremarkable,  HIDA scan is negative

## 2019-09-13 NOTE — PROGRESS NOTES
Progress Note - General Surgery   Adelina Thompson 52 y o  female MRN: [id number]  Unit/Bed#: 410-01 Encounter: [id number]    Assessment:  RUQ pain  Elevated LFT's - possibly d/t biliary stasis secondary to sepsis or cholangitis  MRCP revealed Mild hepatomegaly  16 mm kings[place] hepatic lymph node is a nonspecific finding but could      indicate underlying hepatitis  Tiny quantity of pericholecystic fluid no gallbladder wall thickening      cholelithiasis or gallbladder distention to indicate cholecystitis  Mild retroperitoneal adenopathy better      characterized on the recent CT scan from 2 days earlier  No biliary dilation  No choledocholithiasis  Mild Leukocytosis (wbc 10 3 this AM)  CMP pending    Plan:  HIDA scan today (ordered)  Follow AM labs to include CBC and CMP  Continue antibiotics per primary  OOB ad jude  Medicate PRN pain and nausea  Avoid hepatotoxic medications  IVF hydration  Monitor I/Os    Subjective/Objective   Chief Complaint: RUQ pain    Subjective: States that she is having less nausea than yesterday but attributes this to being NPO  Abdominal discomfort remains the same  She is passing gas and has had a BM  No urinary complaints  Denies fevers/chills  Objective:     Blood pressure 129/71, pulse 83, temperature 99 6 °F (37 6 °C), resp  rate 18, height 5' 8" (1 727 m), weight 108 kg (238 lb 12 8 oz), SpO2 98 %  ,Body mass index is 36 31 kg/m²  Intake/Output Summary (Last 24 hours) at 9/13/2019 0955  Last data filed at 9/13/2019 0548  Gross per 24 hour   Intake 950 ml   Output 401 ml   Net 549 ml       Invasive Devices     Peripheral Intravenous Line            Peripheral IV 09/10/19 Proximal;Right;Ventral (anterior) Forearm 3 days                Physical Exam:  Constitutional: She is oriented to person, place, and time      Eyes: Less  Scleral icterus than yesterday   Cardiovascular: Normal rate, regular rhythm  Pulmonary/Chest: Effort normal and breath sounds normal  No respiratory distress  Abdominal: Soft  Bowel sounds are normal  She exhibits no distension and no mass  There is no rebound and no guarding  No hernia  RUQ tenderness slightly improved from yesterday  Skin: Skin is warm and dry       Lab, Imaging and other studies:   I have personally reviewed pertinent lab results        CBC:   Lab Results   Component Value Date    WBC 10 30 (H) 09/13/2019    HGB 10 9 (L) 09/13/2019    HCT 33 9 (L) 09/13/2019    MCV 85 09/13/2019     09/13/2019    MCH 27 2 09/13/2019    MCHC 32 2 09/13/2019    RDW 15 6 (H) 09/13/2019    MPV 11 6 09/13/2019     CMP: PENDING     VTE Pharmacologic Prophylaxis: Enoxaparin (Lovenox)  VTE Mechanical Prophylaxis: sequential compression device     Albin Nieves PA-C

## 2019-09-14 LAB
ALBUMIN SERPL BCP-MCNC: 1.9 G/DL (ref 3.5–5)
ALP SERPL-CCNC: 395 U/L (ref 46–116)
ALT SERPL W P-5'-P-CCNC: 75 U/L (ref 12–78)
ANION GAP SERPL CALCULATED.3IONS-SCNC: 4 MMOL/L (ref 4–13)
AST SERPL W P-5'-P-CCNC: 105 U/L (ref 5–45)
BASOPHILS # BLD AUTO: 0.05 THOUSANDS/ΜL (ref 0–0.1)
BASOPHILS NFR BLD AUTO: 1 % (ref 0–1)
BILIRUB SERPL-MCNC: 2.5 MG/DL (ref 0.2–1)
BUN SERPL-MCNC: 6 MG/DL (ref 5–25)
CALCIUM SERPL-MCNC: 7.7 MG/DL (ref 8.3–10.1)
CHLORIDE SERPL-SCNC: 98 MMOL/L (ref 100–108)
CMV DNA SERPL NAA+PROBE-ACNC: NEGATIVE IU/ML
CMV DNA SERPL NAA+PROBE-LOG IU: NORMAL LOG10 IU/ML
CO2 SERPL-SCNC: 29 MMOL/L (ref 21–32)
CREAT SERPL-MCNC: 0.86 MG/DL (ref 0.6–1.3)
EOSINOPHIL # BLD AUTO: 0.12 THOUSAND/ΜL (ref 0–0.61)
EOSINOPHIL NFR BLD AUTO: 1 % (ref 0–6)
ERYTHROCYTE [DISTWIDTH] IN BLOOD BY AUTOMATED COUNT: 15.8 % (ref 11.6–15.1)
GFR SERPL CREATININE-BSD FRML MDRD: 76 ML/MIN/1.73SQ M
GLUCOSE SERPL-MCNC: 97 MG/DL (ref 65–140)
HCT VFR BLD AUTO: 33.7 % (ref 34.8–46.1)
HGB BLD-MCNC: 11 G/DL (ref 11.5–15.4)
IMM GRANULOCYTES # BLD AUTO: 0.23 THOUSAND/UL (ref 0–0.2)
IMM GRANULOCYTES NFR BLD AUTO: 3 % (ref 0–2)
LACTATE SERPL-SCNC: 1.1 MMOL/L (ref 0.5–2)
LYMPHOCYTES # BLD AUTO: 4.25 THOUSANDS/ΜL (ref 0.6–4.47)
LYMPHOCYTES NFR BLD AUTO: 46 % (ref 14–44)
MAGNESIUM SERPL-MCNC: 2.5 MG/DL (ref 1.6–2.6)
MCH RBC QN AUTO: 27.6 PG (ref 26.8–34.3)
MCHC RBC AUTO-ENTMCNC: 32.6 G/DL (ref 31.4–37.4)
MCV RBC AUTO: 85 FL (ref 82–98)
MONOCYTES # BLD AUTO: 1.26 THOUSAND/ΜL (ref 0.17–1.22)
MONOCYTES NFR BLD AUTO: 14 % (ref 4–12)
NEUTROPHILS # BLD AUTO: 3.21 THOUSANDS/ΜL (ref 1.85–7.62)
NEUTS SEG NFR BLD AUTO: 35 % (ref 43–75)
NRBC BLD AUTO-RTO: 0 /100 WBCS
PHOSPHATE SERPL-MCNC: 3.2 MG/DL (ref 2.7–4.5)
PLATELET # BLD AUTO: 202 THOUSANDS/UL (ref 149–390)
PMV BLD AUTO: 11.1 FL (ref 8.9–12.7)
POTASSIUM SERPL-SCNC: 3.2 MMOL/L (ref 3.5–5.3)
PROCALCITONIN SERPL-MCNC: 0.57 NG/ML
PROT SERPL-MCNC: 6.3 G/DL (ref 6.4–8.2)
RBC # BLD AUTO: 3.99 MILLION/UL (ref 3.81–5.12)
SODIUM SERPL-SCNC: 131 MMOL/L (ref 136–145)
WBC # BLD AUTO: 9.12 THOUSAND/UL (ref 4.31–10.16)

## 2019-09-14 PROCEDURE — 85025 COMPLETE CBC W/AUTO DIFF WBC: CPT | Performed by: PHYSICIAN ASSISTANT

## 2019-09-14 PROCEDURE — 83605 ASSAY OF LACTIC ACID: CPT | Performed by: INTERNAL MEDICINE

## 2019-09-14 PROCEDURE — 80053 COMPREHEN METABOLIC PANEL: CPT | Performed by: INTERNAL MEDICINE

## 2019-09-14 PROCEDURE — 99232 SBSQ HOSP IP/OBS MODERATE 35: CPT | Performed by: PHYSICIAN ASSISTANT

## 2019-09-14 PROCEDURE — 83735 ASSAY OF MAGNESIUM: CPT | Performed by: INTERNAL MEDICINE

## 2019-09-14 PROCEDURE — 84145 PROCALCITONIN (PCT): CPT | Performed by: INTERNAL MEDICINE

## 2019-09-14 PROCEDURE — 84100 ASSAY OF PHOSPHORUS: CPT | Performed by: INTERNAL MEDICINE

## 2019-09-14 PROCEDURE — 99232 SBSQ HOSP IP/OBS MODERATE 35: CPT | Performed by: SURGERY

## 2019-09-14 RX ORDER — SACCHAROMYCES BOULARDII 250 MG
250 CAPSULE ORAL 2 TIMES DAILY
Status: DISCONTINUED | OUTPATIENT
Start: 2019-09-14 | End: 2019-09-16 | Stop reason: HOSPADM

## 2019-09-14 RX ORDER — POTASSIUM CHLORIDE 20 MEQ/1
40 TABLET, EXTENDED RELEASE ORAL ONCE
Status: COMPLETED | OUTPATIENT
Start: 2019-09-14 | End: 2019-09-14

## 2019-09-14 RX ADMIN — METRONIDAZOLE 500 MG: 500 INJECTION, SOLUTION INTRAVENOUS at 08:08

## 2019-09-14 RX ADMIN — ENOXAPARIN SODIUM 40 MG: 40 INJECTION SUBCUTANEOUS at 08:13

## 2019-09-14 RX ADMIN — METRONIDAZOLE 500 MG: 500 INJECTION, SOLUTION INTRAVENOUS at 23:25

## 2019-09-14 RX ADMIN — CEFTRIAXONE 2000 MG: 2 INJECTION, SOLUTION INTRAVENOUS at 16:14

## 2019-09-14 RX ADMIN — ACETAMINOPHEN 650 MG: 325 TABLET, FILM COATED ORAL at 19:14

## 2019-09-14 RX ADMIN — POTASSIUM CHLORIDE 40 MEQ: 1500 TABLET, EXTENDED RELEASE ORAL at 16:12

## 2019-09-14 RX ADMIN — METRONIDAZOLE 500 MG: 500 INJECTION, SOLUTION INTRAVENOUS at 17:13

## 2019-09-14 RX ADMIN — Medication 250 MG: at 17:57

## 2019-09-14 NOTE — PROGRESS NOTES
Progress Note - General Surgery   Susan Thompson 52 y o  female MRN: [id number]  Unit/Bed#: 410-01 Encounter: [id number]    Assessment:  Pyelonephritis  Abdominal pain - MRCP and HIDA both unremarkable  EBV testing positive  Elevated LFT's - improving, Tbili now 2 5, down from 3 6 and  6 3    Plan:  No surgical intervention at this time  Will sign off at this time  Continue antibiotics per ID    Subjective/Objective   Chief Complaint: I feel much better    Subjective: No events overnight  Patient feeling better this AM, requesting a regular diet as she has tolerated clear liquids  Pain improving, nausea improving    Objective:     Blood pressure 114/68, pulse 80, temperature (!) 100 8 °F (38 2 °C), resp  rate 20, height 5' 8" (1 727 m), weight 109 kg (239 lb 13 8 oz), SpO2 98 %  ,Body mass index is 36 47 kg/m²  Intake/Output Summary (Last 24 hours) at 9/14/2019 0843  Last data filed at 9/14/2019 0324  Gross per 24 hour   Intake 1405 ml   Output 1550 ml   Net -145 ml       Invasive Devices     Peripheral Intravenous Line            Peripheral IV 09/10/19 Proximal;Right;Ventral (anterior) Forearm 4 days    Peripheral IV 09/13/19 Right Forearm less than 1 day                Physical Exam:   Eyes: Icterus resolved  Cardiovascular: Normal rate, regular rhythm  Pulmonary/Chest: Effort normal and breath sounds normal  No respiratory distress  Abdominal: Soft  Bowel sounds are normal  She exhibits no distension and no mass  There is no rebound and no guarding  No hernia    Skin: Skin is warm and dry  Lab, Imaging and other studies:   I have personally reviewed pertinent lab results        CBC:   Lab Results   Component Value Date    WBC 9 12 09/14/2019    HGB 11 0 (L) 09/14/2019    HCT 33 7 (L) 09/14/2019    MCV 85 09/14/2019     09/14/2019    MCH 27 6 09/14/2019    MCHC 32 6 09/14/2019    RDW 15 8 (H) 09/14/2019    MPV 11 1 09/14/2019    NRBC 0 09/14/2019     CMP:   Lab Results   Component Value Date SODIUM 131 (L) 09/14/2019    K 3 2 (L) 09/14/2019    CL 98 (L) 09/14/2019    CO2 29 09/14/2019    BUN 6 09/14/2019    CREATININE 0 86 09/14/2019    CALCIUM 7 7 (L) 09/14/2019     (H) 09/14/2019    ALT 75 09/14/2019    ALKPHOS 395 (H) 09/14/2019    EGFR 76 09/14/2019      Total Bilirubin 2 5    VTE Pharmacologic Prophylaxis:  Enoxaparin (Lovenox)  VTE Mechanical Prophylaxis: sequential compression device       Elgin Chaves PA-C

## 2019-09-14 NOTE — PROGRESS NOTES
Progress Note - Vladimir Cedeno 1965, 52 y o  female MRN: [id number]    Unit/Bed#: 410-01 Encounter: [id number]    Primary Care Provider: Kimberly Cisneros DO   Date and time admitted to hospital: 9/7/2019  4:45 PM        Acute febrile illness  Assessment & Plan  Patient with fevers despite treatment with IV Levaquin and IV aztreonam   Infectious disease consult appreciated  Blood cultures (9/7) no growth at 5 days- repeat blood cultures (9/10) negative x 72 hours  Urine culture (9/7) negative  Repeat urine culture (9/10) negative  Procalcitonin elevated at 0 47 initially,  trended up at 1 82, now continues to trend down  IV Levaquin and IV aztreonam discontinued on 9/10/19 and start on IV rocephin her ID recommendations  IV flagyl added on 9/12  ID also recommended to check for:   EBV - positive IgM- possible acute infection  CMV - negative  Lyme - negative  Ehrlichia - pending    CT abdomen pelvis with contrast:   Bilateral iliac chain and left inguinal lymphadenopathy, nonspecific, possibly infectious/inflammatory, although neoplastic etiologies cannot be excluded  GI consulted given bilirubin elevated from 2 6 , peaked at 6 6 on 9/11 now 2 5 today  RUQ/epigastric pain, nausea with decreased po intake--resolved  General surgery consult appreciated - MRCP is unremarkable,  HIDA scan is negative  Patient with low grade fever of 100 8 this morning 9/14, continue to monitor  * Sepsis [facility])  Assessment & Plan  Present on admission with and Fever, tachycardia in the setting of abnormal urinalysis suggestive of UTI, however urine culture was negative  Now looking for new source of infection  Sepsis protocol initiated in the ER including IV fluid hydration and IV Levaquin  Patient continues to have fevers with a temperature of 103 overnight last night (9/9)  Infectious disease consultation appreciated       IV Levaquin and IV aztreonam discontinued and switched to IV Rocephin on 9/10/19 per ID recommendations  IV flagyl added 9/12/19  Blood cultures (9/7) no growth at 5 days    Repeat blood cultures (9/10/19) negative x 72 hours  Urine culture (9/7) negative  Repeat urine culture (9/10/19) showing no growth  Procalcitonin trended up initially, but now continues to trend down  CT abdomen pelvis with contrast: negative for gallstones or dilated ducts  RUQ Abdominal ultrasound: IMPRESSION:  1  Mild hepatomegaly  2  Gallbladder wall thickening in a decompressed gallbladder  No gallstones  Positive sonographic Tijerina sign  HIDA scan would be useful for cholecystitis diagnosis  General surgery consult for concerns for possible choledocholithiasis/cholangitis  MRCP: unremarkable  HIDA scan unremarkable  EBV testing positive  Possible atypical presentation of mononucelosis / viral hepatitis? Regardless, patient seems to be improving on antibiotic regimen  Dr Beka Miranda is recommending complete 7 days of IV antibiotics, transition to 10 days oral    Outpatient ID follow up soon after discharge  Hyperbilirubinemia  Assessment & Plan  Bilirubin elevated at 2 0 on admission which was thought to be due to sepsis, however this has trended up to 6 6 at max, now trending down again  GI consult appreciated  General surgery consult appreciated  MRCP and HIDA unremarkable  Continue to follow CMP daily  Acute cystitis without hematuria  Assessment & Plan  UA results consistent with UTI, however urine cultures are negative x 2  Hydronephrosis, persistent fever, and flank pain thought to be early pyelonephritis  IV Levaquin and IV aztreonam discontinued and switched to IV rocephin on 9/10/19 per ID, IV flagyl added on 9/12 per ID recommendations  Hyponatremia  Assessment & Plan  A sodium level at 132 on admission  Initially thought most likely hypovolemic hyponatremia, as the patient continues to have decreased PO intake  Remains consistent at 131 today    IV fluids discontinued  in the setting of possible volume overload  - patient notes weight has increased  SOB and oxygen requiring on - patient now on room air  Chest x-ray: No definite evidence of acute cardiopulmonary disease         Elevated d-dimer  Assessment & Plan  Likely in the setting of acute infection / sepsis  D-dimer level at 5,386   CT pe study negative for acute PE  Venous duplex study of the BLE negative for DVT  Hydronephrosis  Assessment & Plan  CT abdomen and pelvis on admission :   IMPRESSION:  1  No urinary tract calculi  2   Bilateral hydronephrosis, mild on the right and moderate on the left, developing since a sonogram from 10/7/2016  Will follow urinary retention protocol  Check post void residuals - normal   Urology consultation appreciated- outpatient follow-up in office         VTE Pharmacologic Prophylaxis:   Pharmacologic: Enoxaparin (Lovenox)  Mechanical VTE Prophylaxis in Place: Yes    Patient Centered Rounds: I have performed bedside rounds with nursing staff today  Discussions with Specialists or Other Care Team Provider: nursing and attending    Education and Discussions with Family / Patient:  updated at bedside    Time Spent for Care: 30 minutes  More than 50% of total time spent on counseling and coordination of care as described above  Current Length of Stay: 7 day(s)    Current Patient Status: Inpatient   Certification Statement: The patient will continue to require additional inpatient hospital stay due to continued need for IV antibiotics  Discharge Plan: Possible discharge tomorrow if afebrile for 24 hours    Code Status: Level 1 - Full Code      Subjective: The patient was seen and examined  The patient states her abdominal pain has resolved  She is tolerating a regular diet today  She was noted to have a fever of 100 8 this morning      Objective:     Vitals:   Temp (24hrs), Av 7 °F (37 6 °C), Min:98 4 °F (36 9 °C), Max:100 8 °F (38 2 °C)    Temp:  [98 4 °F (36 9 °C)-100 8 °F (38 2 °C)] 98 6 °F (37 °C)  HR:  [75-95] 95  Resp:  [16-20] 18  BP: (114-130)/(63-80) 120/63  SpO2:  [91 %-99 %] 91 %  Body mass index is 36 47 kg/m²  Input and Output Summary (last 24 hours): Intake/Output Summary (Last 24 hours) at 9/14/2019 1548  Last data filed at 9/14/2019 1453  Gross per 24 hour   Intake 1855 ml   Output 2000 ml   Net -145 ml       Physical Exam:     Physical Exam   Constitutional: She is oriented to person, place, and time  Vital signs are normal  She appears well-developed and well-nourished  She is active and cooperative  Cardiovascular: Normal rate, regular rhythm and normal heart sounds  Pulmonary/Chest: Effort normal and breath sounds normal  She has no wheezes  She has no rhonchi  She has no rales  Abdominal: Soft  Normal appearance and bowel sounds are normal  She exhibits no distension  There is no tenderness  Neurological: She is alert and oriented to person, place, and time  No cranial nerve deficit  Skin: Skin is warm and intact  Nursing note and vitals reviewed        Additional Data:     Labs:    Results from last 7 days   Lab Units 09/14/19  0433   WBC Thousand/uL 9 12   HEMOGLOBIN g/dL 11 0*   HEMATOCRIT % 33 7*   PLATELETS Thousands/uL 202   NEUTROS PCT % 35*   LYMPHS PCT % 46*   MONOS PCT % 14*   EOS PCT % 1     Results from last 7 days   Lab Units 09/14/19  0433   SODIUM mmol/L 131*   POTASSIUM mmol/L 3 2*   CHLORIDE mmol/L 98*   CO2 mmol/L 29   BUN mg/dL 6   CREATININE mg/dL 0 86   ANION GAP mmol/L 4   CALCIUM mg/dL 7 7*   ALBUMIN g/dL 1 9*   TOTAL BILIRUBIN mg/dL 2 50*   ALK PHOS U/L 395*   ALT U/L 75   AST U/L 105*   GLUCOSE RANDOM mg/dL 97     Results from last 7 days   Lab Units 09/07/19  1704   INR  1 02             Results from last 7 days   Lab Units 09/14/19  0433 09/13/19  1159 09/12/19  0524 09/10/19  1344 09/08/19  0501  09/07/19  1922 09/07/19  1704   LACTIC ACID mmol/L 1 1  --   --   --   -- --  1 1 1 2   PROCALCITONIN ng/ml 0 57* 0 68* 1 21* 1 82* 0 67*   < >  --  0 47*    < > = values in this interval not displayed  * I Have Reviewed All Lab Data Listed Above  * Additional Pertinent Lab Tests Reviewed: All Labs Within Last 24 Hours Reviewed    Imaging:    Imaging Reports Reviewed Today Include: chest x-ray  Imaging Personally Reviewed by Myself Includes:  none    Recent Cultures (last 7 days):     Results from last 7 days   Lab Units 09/10/19  1343 09/10/19  1210 09/08/19  0742 09/07/19  1704   BLOOD CULTURE  No Growth at 72 hrs  No Growth at 72 hrs   --   --  No Growth After 5 Days  No Growth After 5 Days  URINE CULTURE   --  No Growth <1000 cfu/mL <10,000 cfu/ml   --        Last 24 Hours Medication List:     Current Facility-Administered Medications:  acetaminophen 650 mg Oral Q6H PRN Jd Mireles PA-C    cefTRIAXone 2,000 mg Intravenous Q24H Millicent Rojas PA-C Last Rate: Stopped (09/13/19 1534)   enoxaparin 40 mg Subcutaneous Daily Jd Mireles PA-C    HYDROmorphone 0 5 mg Intravenous Q4H PRN Erickson Arndt PA-C    metroNIDAZOLE 500 mg Intravenous Q8H Erickson Arndt PA-C Last Rate: 500 mg (09/14/19 0808)   ondansetron 4 mg Intravenous Q6H PRN Jd Mireles PA-C         Today, Patient Was Seen By: Millicent Rojas PA-C    ** Please Note: Dictation voice to text software may have been used in the creation of this document   **

## 2019-09-14 NOTE — ASSESSMENT & PLAN NOTE
Present on admission with and Fever, tachycardia in the setting of abnormal urinalysis suggestive of UTI, however urine culture was negative  Now looking for new source of infection  Sepsis protocol initiated in the ER including IV fluid hydration and IV Levaquin  Patient continues to have fevers with a temperature of 103 overnight last night (9/9)  Infectious disease consultation appreciated  IV Levaquin and IV aztreonam discontinued and switched to IV Rocephin on 9/10/19 per ID recommendations  IV flagyl added 9/12/19  Blood cultures (9/7) no growth at 5 days    Repeat blood cultures (9/10/19) negative x 72 hours  Urine culture (9/7) negative  Repeat urine culture (9/10/19) showing no growth  Procalcitonin trended up initially, but now continues to trend down  CT abdomen pelvis with contrast: negative for gallstones or dilated ducts  RUQ Abdominal ultrasound: IMPRESSION:  1  Mild hepatomegaly  2  Gallbladder wall thickening in a decompressed gallbladder  No gallstones  Positive sonographic Tijerina sign  HIDA scan would be useful for cholecystitis diagnosis  General surgery consult for concerns for possible choledocholithiasis/cholangitis  MRCP: unremarkable  HIDA scan unremarkable  EBV testing positive  Possible atypical presentation of mononucelosis / viral hepatitis? Regardless, patient seems to be improving on antibiotic regimen  Dr Andre Crowley is recommending complete 7 days of IV antibiotics, transition to 10 days oral    Outpatient ID follow up soon after discharge

## 2019-09-14 NOTE — ASSESSMENT & PLAN NOTE
A sodium level at 132 on admission  Initially thought most likely hypovolemic hyponatremia, as the patient continues to have decreased PO intake  Remains consistent at 131 today  IV fluids discontinued 9/13 in the setting of possible volume overload  - patient notes weight has increased  SOB and oxygen requiring on 9/13- patient now on room air  Chest x-ray: No definite evidence of acute cardiopulmonary disease  Ana María Chacko

## 2019-09-14 NOTE — ASSESSMENT & PLAN NOTE
UA results consistent with UTI, however urine cultures are negative x 2  Hydronephrosis, persistent fever, and flank pain thought to be early pyelonephritis  IV Levaquin and IV aztreonam discontinued and switched to IV rocephin on 9/10/19 per ID, IV flagyl added on 9/12 per ID recommendations

## 2019-09-14 NOTE — ASSESSMENT & PLAN NOTE
Patient with fevers despite treatment with IV Levaquin and IV aztreonam   Infectious disease consult appreciated  Blood cultures (9/7) no growth at 5 days- repeat blood cultures (9/10) negative x 72 hours  Urine culture (9/7) negative  Repeat urine culture (9/10) negative  Procalcitonin elevated at 0 47 initially,  trended up at 1 82, now continues to trend down  IV Levaquin and IV aztreonam discontinued on 9/10/19 and start on IV rocephin her ID recommendations  IV flagyl added on 9/12  ID also recommended to check for:   EBV - positive IgM- possible acute infection  CMV - negative  Lyme - negative  Ehrlichia - pending    CT abdomen pelvis with contrast:   Bilateral iliac chain and left inguinal lymphadenopathy, nonspecific, possibly infectious/inflammatory, although neoplastic etiologies cannot be excluded  GI consulted given bilirubin elevated from 2 6 , peaked at 6 6 on 9/11 now 2 5 today  RUQ/epigastric pain, nausea with decreased po intake--resolved  General surgery consult appreciated - MRCP is unremarkable,  HIDA scan is negative  Patient with low grade fever of 100 8 this morning 9/14, continue to monitor

## 2019-09-14 NOTE — ASSESSMENT & PLAN NOTE
Bilirubin elevated at 2 0 on admission which was thought to be due to sepsis, however this has trended up to 6 6 at max, now trending down again  GI consult appreciated  General surgery consult appreciated  MRCP and HIDA unremarkable  Continue to follow CMP daily

## 2019-09-15 ENCOUNTER — APPOINTMENT (INPATIENT)
Dept: CT IMAGING | Facility: HOSPITAL | Age: 54
DRG: 872 | End: 2019-09-15
Payer: COMMERCIAL

## 2019-09-15 LAB
ALBUMIN SERPL BCP-MCNC: 1.9 G/DL (ref 3.5–5)
ALP SERPL-CCNC: 457 U/L (ref 46–116)
ALT SERPL W P-5'-P-CCNC: 73 U/L (ref 12–78)
ANION GAP SERPL CALCULATED.3IONS-SCNC: 6 MMOL/L (ref 4–13)
AST SERPL W P-5'-P-CCNC: 116 U/L (ref 5–45)
BACTERIA BLD CULT: NORMAL
BACTERIA BLD CULT: NORMAL
BILIRUB SERPL-MCNC: 2 MG/DL (ref 0.2–1)
BUN SERPL-MCNC: 4 MG/DL (ref 5–25)
CALCIUM SERPL-MCNC: 7.8 MG/DL (ref 8.3–10.1)
CHLORIDE SERPL-SCNC: 98 MMOL/L (ref 100–108)
CO2 SERPL-SCNC: 29 MMOL/L (ref 21–32)
CREAT SERPL-MCNC: 0.73 MG/DL (ref 0.6–1.3)
GFR SERPL CREATININE-BSD FRML MDRD: 93 ML/MIN/1.73SQ M
GLUCOSE SERPL-MCNC: 111 MG/DL (ref 65–140)
POTASSIUM SERPL-SCNC: 3.4 MMOL/L (ref 3.5–5.3)
PROT SERPL-MCNC: 6.2 G/DL (ref 6.4–8.2)
SODIUM SERPL-SCNC: 133 MMOL/L (ref 136–145)

## 2019-09-15 PROCEDURE — 80053 COMPREHEN METABOLIC PANEL: CPT | Performed by: PHYSICIAN ASSISTANT

## 2019-09-15 PROCEDURE — 71260 CT THORAX DX C+: CPT

## 2019-09-15 PROCEDURE — 99232 SBSQ HOSP IP/OBS MODERATE 35: CPT | Performed by: PHYSICIAN ASSISTANT

## 2019-09-15 PROCEDURE — 87505 NFCT AGENT DETECTION GI: CPT | Performed by: FAMILY MEDICINE

## 2019-09-15 PROCEDURE — 74177 CT ABD & PELVIS W/CONTRAST: CPT

## 2019-09-15 RX ORDER — METOCLOPRAMIDE HYDROCHLORIDE 5 MG/ML
5 INJECTION INTRAMUSCULAR; INTRAVENOUS ONCE
Status: COMPLETED | OUTPATIENT
Start: 2019-09-15 | End: 2019-09-15

## 2019-09-15 RX ORDER — LORATADINE 10 MG/1
10 TABLET ORAL DAILY
Status: DISCONTINUED | OUTPATIENT
Start: 2019-09-15 | End: 2019-09-16 | Stop reason: HOSPADM

## 2019-09-15 RX ORDER — POTASSIUM CHLORIDE 20 MEQ/1
40 TABLET, EXTENDED RELEASE ORAL ONCE
Status: COMPLETED | OUTPATIENT
Start: 2019-09-15 | End: 2019-09-15

## 2019-09-15 RX ORDER — ONDANSETRON 4 MG/1
4 TABLET, ORALLY DISINTEGRATING ORAL ONCE
Status: COMPLETED | OUTPATIENT
Start: 2019-09-15 | End: 2019-09-15

## 2019-09-15 RX ADMIN — IOHEXOL 50 ML: 240 INJECTION, SOLUTION INTRATHECAL; INTRAVASCULAR; INTRAVENOUS; ORAL at 12:28

## 2019-09-15 RX ADMIN — ONDANSETRON 4 MG: 2 INJECTION INTRAMUSCULAR; INTRAVENOUS at 17:52

## 2019-09-15 RX ADMIN — CEFTRIAXONE 2000 MG: 2 INJECTION, SOLUTION INTRAVENOUS at 16:07

## 2019-09-15 RX ADMIN — Medication 250 MG: at 17:51

## 2019-09-15 RX ADMIN — IOHEXOL 100 ML: 350 INJECTION, SOLUTION INTRAVENOUS at 12:29

## 2019-09-15 RX ADMIN — POTASSIUM CHLORIDE 40 MEQ: 1500 TABLET, EXTENDED RELEASE ORAL at 09:37

## 2019-09-15 RX ADMIN — VANCOMYCIN HYDROCHLORIDE 250 MG: 5 INJECTION, POWDER, LYOPHILIZED, FOR SOLUTION INTRAVENOUS at 17:51

## 2019-09-15 RX ADMIN — ENOXAPARIN SODIUM 40 MG: 40 INJECTION SUBCUTANEOUS at 09:37

## 2019-09-15 RX ADMIN — ACETAMINOPHEN 650 MG: 325 TABLET, FILM COATED ORAL at 23:45

## 2019-09-15 RX ADMIN — METOCLOPRAMIDE 5 MG: 5 INJECTION, SOLUTION INTRAMUSCULAR; INTRAVENOUS at 20:01

## 2019-09-15 RX ADMIN — METRONIDAZOLE 500 MG: 500 INJECTION, SOLUTION INTRAVENOUS at 17:52

## 2019-09-15 RX ADMIN — Medication 250 MG: at 09:37

## 2019-09-15 RX ADMIN — METRONIDAZOLE 500 MG: 500 INJECTION, SOLUTION INTRAVENOUS at 23:36

## 2019-09-15 RX ADMIN — VANCOMYCIN HYDROCHLORIDE 250 MG: 5 INJECTION, POWDER, LYOPHILIZED, FOR SOLUTION INTRAVENOUS at 12:33

## 2019-09-15 RX ADMIN — VANCOMYCIN HYDROCHLORIDE 250 MG: 5 INJECTION, POWDER, LYOPHILIZED, FOR SOLUTION INTRAVENOUS at 23:37

## 2019-09-15 RX ADMIN — ONDANSETRON 4 MG: 4 TABLET, ORALLY DISINTEGRATING ORAL at 12:50

## 2019-09-15 RX ADMIN — LORATADINE 10 MG: 10 TABLET ORAL at 10:31

## 2019-09-15 RX ADMIN — METRONIDAZOLE 500 MG: 500 INJECTION, SOLUTION INTRAVENOUS at 09:37

## 2019-09-15 NOTE — ASSESSMENT & PLAN NOTE
Present on admission with and Fever, tachycardia in the setting of abnormal urinalysis suggestive of UTI, however urine culture was negative  Now looking for new source of infection  Sepsis protocol initiated in the ER including IV fluid hydration and IV Levaquin  Patient continues to have fevers with a temperature of 102 5 overnight last night (9/14)  Infectious disease consultation appreciated  IV Levaquin and IV aztreonam discontinued and switched to IV Rocephin on 9/10/19 per ID recommendations  IV flagyl added 9/12/19  Blood cultures (9/7) no growth at 5 days  Repeat blood cultures (9/10/19) negative x 4 days  Urine culture (9/7) negative  Repeat urine culture (9/10/19) showing no growth  Procalcitonin trended up initially, but now continues to trend down  CT abdomen pelvis with contrast: negative for gallstones or dilated ducts  RUQ Abdominal ultrasound:   IMPRESSION:  1  Mild hepatomegaly  2  Gallbladder wall thickening in a decompressed gallbladder  No gallstones  Positive sonographic Tijerina sign  HIDA scan would be useful for cholecystitis diagnosis  General surgery consult for concerns for possible choledocholithiasis/cholangitis  MRCP: unremarkable  HIDA scan unremarkable  EBV testing positive  Possible atypical presentation of mononucelosis / viral hepatitis? Dr Dany Balderas is recommended to complete 7 days of IV antibiotics, transition to 10 days oral  However, the patient continues to spike fevers  Will need to follow-up with Dr Dany Balderas tomorrow

## 2019-09-15 NOTE — ASSESSMENT & PLAN NOTE
A sodium level at 132 on admission  Initially thought most likely hypovolemic hyponatremia, as the patient continues to have decreased PO intake  Remains consistent at 133 today  IV fluids discontinued 9/13 in the setting of possible volume overload  - patient notes weight has increased  SOB and oxygen requiring on 9/13- patient now on room air  Chest x-ray: No definite evidence of acute cardiopulmonary disease  Consuelo Phoenix

## 2019-09-15 NOTE — PROGRESS NOTES
Progress Note - Tigist Franco 1965, 52 y o  female MRN: [id number]    Unit/Bed#: 410-01 Encounter: [id number]    Primary Care Provider: Chandni Vazquez DO   Date and time admitted to hospital: 9/7/2019  4:45 PM        Acute febrile illness  Assessment & Plan  Patient continues to have fevers despite treatment with IV antibiotics  Fever noted to be 102 5 last night (9/14)  Infectious disease consult appreciated  Blood cultures (9/7) no growth at 5 days- repeat blood cultures (9/10) negative at 4 days  Urine culture (9/7) negative  Repeat urine culture (9/10) negative  Procalcitonin elevated at 0 47 initially,  trended up at 1 82, now continues to trend down  IV Levaquin and IV aztreonam discontinued on 9/10/19 and start on IV rocephin her ID recommendations  IV flagyl added on 9/12  ID also recommended to check for:   EBV - positive IgM- possible acute infection  CMV - negative  Lyme - negative  Ehrlichia - negative  CT abdomen pelvis with contrast:   Bilateral iliac chain and left inguinal lymphadenopathy, nonspecific, possibly infectious/inflammatory, although neoplastic etiologies cannot be excluded  GI consulted given bilirubin elevated from 2 6 , peaked at 6 6 on 9/11 now 2 0 today  RUQ/epigastric pain, nausea with decreased po intake--resolved  General surgery consult appreciated - MRCP is unremarkable,  HIDA scan is negative  Will check CT chest/abdomen/pelvis with PO and IV contrast given persistent fevers with no source  Patient with fever of 102 5 last night 9/14, continue to monitor  Will need to follow-up with Dr Mike Steven (ID) tomorrow  * Sepsis [facility])  Assessment & Plan  Present on admission with and Fever, tachycardia in the setting of abnormal urinalysis suggestive of UTI, however urine culture was negative  Now looking for new source of infection  Sepsis protocol initiated in the ER including IV fluid hydration and IV Levaquin    Patient continues to have fevers with a temperature of 102 5 overnight last night (9/14)  Infectious disease consultation appreciated  IV Levaquin and IV aztreonam discontinued and switched to IV Rocephin on 9/10/19 per ID recommendations  IV flagyl added 9/12/19  Blood cultures (9/7) no growth at 5 days  Repeat blood cultures (9/10/19) negative x 4 days  Urine culture (9/7) negative  Repeat urine culture (9/10/19) showing no growth  Procalcitonin trended up initially, but now continues to trend down  CT abdomen pelvis with contrast: negative for gallstones or dilated ducts  RUQ Abdominal ultrasound:   IMPRESSION:  1  Mild hepatomegaly  2  Gallbladder wall thickening in a decompressed gallbladder  No gallstones  Positive sonographic Tijerina sign  HIDA scan would be useful for cholecystitis diagnosis  General surgery consult for concerns for possible choledocholithiasis/cholangitis  MRCP: unremarkable  HIDA scan unremarkable  EBV testing positive  Possible atypical presentation of mononucelosis / viral hepatitis? Dr Dany Balderas is recommended to complete 7 days of IV antibiotics, transition to 10 days oral  However, the patient continues to spike fevers  Will need to follow-up with Dr Dany Balderas tomorrow  Hyperbilirubinemia  Assessment & Plan  Bilirubin elevated at 2 0 on admission which was thought to be due to sepsis, however this has trended up to 6 6 at max, now trending down again  GI consult appreciated  General surgery consult appreciated  MRCP and HIDA unremarkable  Continue to follow CMP daily  Acute cystitis without hematuria  Assessment & Plan  UA results consistent with UTI, however urine cultures are negative x 2  Hydronephrosis, persistent fever, and flank pain thought to be early pyelonephritis  IV Levaquin and IV aztreonam discontinued and switched to IV rocephin on 9/10/19 per ID, IV flagyl added on 9/12 per ID recommendations          Hyponatremia  Assessment & Plan  A sodium level at 132 on admission  Initially thought most likely hypovolemic hyponatremia, as the patient continues to have decreased PO intake  Remains consistent at 133 today  IV fluids discontinued 9/13 in the setting of possible volume overload  - patient notes weight has increased  SOB and oxygen requiring on 9/13- patient now on room air  Chest x-ray: No definite evidence of acute cardiopulmonary disease         Elevated d-dimer  Assessment & Plan  Likely in the setting of acute infection / sepsis  D-dimer level at 5,386   CT pe study negative for acute PE  Venous duplex study of the BLE negative for DVT  Hydronephrosis  Assessment & Plan  CT abdomen and pelvis on admission :   IMPRESSION:  1  No urinary tract calculi  2   Bilateral hydronephrosis, mild on the right and moderate on the left, developing since a sonogram from 10/7/2016  Will follow urinary retention protocol  Check post void residuals - normal   Urology consultation appreciated- outpatient follow-up in office       VTE Pharmacologic Prophylaxis:   Pharmacologic: Enoxaparin (Lovenox)  Mechanical VTE Prophylaxis in Place: Yes    Patient Centered Rounds: I have performed bedside rounds with nursing staff today  Discussions with Specialists or Other Care Team Provider: nursing and attending    Education and Discussions with Family / Patient:  updated at bedside    Time Spent for Care: 30 minutes  More than 50% of total time spent on counseling and coordination of care as described above  Current Length of Stay: 8 day(s)    Current Patient Status: Inpatient   Certification Statement: The patient will continue to require additional inpatient hospital stay due to continued workup for persistent fevers    Discharge Plan: TBD    Code Status: Level 1 - Full Code      Subjective: The patient was seen and examined  The patient states she's been feeling very tired and sleeping most of the day       Objective:     Vitals:   Temp (24hrs), Av 3 °F (37 4 °C), Min:98 °F (36 7 °C), Max:102 5 °F (39 2 °C)    Temp:  [98 °F (36 7 °C)-102 5 °F (39 2 °C)] 98 9 °F (37 2 °C)  HR:  [82-96] 94  Resp:  [18] 18  BP: (120-141)/(63-80) 124/72  SpO2:  [91 %-97 %] 93 %  Body mass index is 36 07 kg/m²  Input and Output Summary (last 24 hours): Intake/Output Summary (Last 24 hours) at 9/15/2019 1019  Last data filed at 9/15/2019 0700  Gross per 24 hour   Intake 300 ml   Output 4575 ml   Net -4275 ml       Physical Exam:     Physical Exam   Constitutional: She is oriented to person, place, and time  She appears well-developed and well-nourished  She is sleeping  She is easily aroused  Cardiovascular: Normal rate, regular rhythm and normal heart sounds  Pulmonary/Chest: Effort normal and breath sounds normal  She has no wheezes  She has no rhonchi  She has no rales  Abdominal: Soft  Normal appearance and bowel sounds are normal  There is no tenderness  Neurological: She is alert, oriented to person, place, and time and easily aroused  No cranial nerve deficit  Skin: Skin is warm, dry and intact  Nursing note and vitals reviewed      Additional Data:     Labs:    Results from last 7 days   Lab Units 19  0433   WBC Thousand/uL 9 12   HEMOGLOBIN g/dL 11 0*   HEMATOCRIT % 33 7*   PLATELETS Thousands/uL 202   NEUTROS PCT % 35*   LYMPHS PCT % 46*   MONOS PCT % 14*   EOS PCT % 1     Results from last 7 days   Lab Units 09/15/19  0524   SODIUM mmol/L 133*   POTASSIUM mmol/L 3 4*   CHLORIDE mmol/L 98*   CO2 mmol/L 29   BUN mg/dL 4*   CREATININE mg/dL 0 73   ANION GAP mmol/L 6   CALCIUM mg/dL 7 8*   ALBUMIN g/dL 1 9*   TOTAL BILIRUBIN mg/dL 2 00*   ALK PHOS U/L 457*   ALT U/L 73   AST U/L 116*   GLUCOSE RANDOM mg/dL 111                 Results from last 7 days   Lab Units 19  0433 19  1159 19  0524 09/10/19  1344   LACTIC ACID mmol/L 1 1  --   --   --    PROCALCITONIN ng/ml 0 57* 0 68* 1 21* 1 82*           * I Have Reviewed All Lab Data Listed Above  * Additional Pertinent Lab Tests Reviewed: All Labs Within Last 24 Hours Reviewed    Imaging:    Imaging Reports Reviewed Today Include: none  Imaging Personally Reviewed by Myself Includes:  none    Recent Cultures (last 7 days):     Results from last 7 days   Lab Units 09/10/19  1343 09/10/19  1210   BLOOD CULTURE  No Growth After 4 Days  No Growth After 4 Days  --    URINE CULTURE   --  No Growth <1000 cfu/mL       Last 24 Hours Medication List:     Current Facility-Administered Medications:  acetaminophen 650 mg Oral Q6H PRN Jd Mireles PA-C    cefTRIAXone 2,000 mg Intravenous Q24H Gabriela Bauer PA-C Last Rate: 2,000 mg (09/14/19 1614)   enoxaparin 40 mg Subcutaneous Daily Jd Mireles PA-C    HYDROmorphone 0 5 mg Intravenous Q4H PRN Erickson Arndt PA-C    loratadine 10 mg Oral Daily Gabriela Bauer PA-C    metroNIDAZOLE 500 mg Intravenous Q8H Erickson Arndt PA-C Last Rate: 500 mg (09/15/19 0937)   ondansetron 4 mg Intravenous Q6H PRN Jd Mireles PA-C    saccharomyces boulardii 250 mg Oral BID Gabriela Bauer PA-C    vancomycin 250 mg Oral Q6H Jessica Martel MD         Today, Patient Was Seen By: Gabriela Bauer PA-C    ** Please Note: Dictation voice to text software may have been used in the creation of this document   **

## 2019-09-15 NOTE — NURSING NOTE
C/o nausea after oral contrast  IV site firm to touch   Attempted restick without success   Nursing supervisor made aware to try for IV access  Pt did start with the dry heaves and then vomited approx 50ml yellow emesis  Bj Soares PA-C made aware   ODT zofran ordered and given  Will monitor   Cat scan completed as ordered

## 2019-09-16 VITALS
SYSTOLIC BLOOD PRESSURE: 120 MMHG | BODY MASS INDEX: 35.16 KG/M2 | DIASTOLIC BLOOD PRESSURE: 73 MMHG | HEIGHT: 68 IN | HEART RATE: 90 BPM | RESPIRATION RATE: 18 BRPM | WEIGHT: 232 LBS | TEMPERATURE: 99 F | OXYGEN SATURATION: 96 %

## 2019-09-16 PROBLEM — N30.00 ACUTE CYSTITIS WITHOUT HEMATURIA: Status: RESOLVED | Noted: 2019-09-07 | Resolved: 2019-09-16

## 2019-09-16 LAB
ALBUMIN SERPL BCP-MCNC: 2 G/DL (ref 3.5–5)
ALP SERPL-CCNC: 466 U/L (ref 46–116)
ALT SERPL W P-5'-P-CCNC: 62 U/L (ref 12–78)
ANION GAP SERPL CALCULATED.3IONS-SCNC: 7 MMOL/L (ref 4–13)
ANISOCYTOSIS BLD QL SMEAR: PRESENT
AST SERPL W P-5'-P-CCNC: 99 U/L (ref 5–45)
BASO STIPL BLD QL SMEAR: PRESENT
BASOPHILS # BLD MANUAL: 0 THOUSAND/UL (ref 0–0.1)
BASOPHILS NFR MAR MANUAL: 0 % (ref 0–1)
BILIRUB SERPL-MCNC: 1.4 MG/DL (ref 0.2–1)
BUN SERPL-MCNC: 5 MG/DL (ref 5–25)
CALCIUM SERPL-MCNC: 7.8 MG/DL (ref 8.3–10.1)
CHLORIDE SERPL-SCNC: 100 MMOL/L (ref 100–108)
CO2 SERPL-SCNC: 28 MMOL/L (ref 21–32)
CREAT SERPL-MCNC: 0.84 MG/DL (ref 0.6–1.3)
EOSINOPHIL # BLD MANUAL: 0.28 THOUSAND/UL (ref 0–0.4)
EOSINOPHIL NFR BLD MANUAL: 3 % (ref 0–6)
ERYTHROCYTE [DISTWIDTH] IN BLOOD BY AUTOMATED COUNT: 16.1 % (ref 11.6–15.1)
GFR SERPL CREATININE-BSD FRML MDRD: 78 ML/MIN/1.73SQ M
GLUCOSE SERPL-MCNC: 111 MG/DL (ref 65–140)
HCT VFR BLD AUTO: 32.1 % (ref 34.8–46.1)
HGB BLD-MCNC: 10.6 G/DL (ref 11.5–15.4)
LYMPHOCYTES # BLD AUTO: 2.76 THOUSAND/UL (ref 0.6–4.47)
LYMPHOCYTES # BLD AUTO: 30 % (ref 14–44)
MAGNESIUM SERPL-MCNC: 2.2 MG/DL (ref 1.6–2.6)
MCH RBC QN AUTO: 27.2 PG (ref 26.8–34.3)
MCHC RBC AUTO-ENTMCNC: 33 G/DL (ref 31.4–37.4)
MCV RBC AUTO: 82 FL (ref 82–98)
METAMYELOCYTES NFR BLD MANUAL: 1 % (ref 0–1)
MONOCYTES # BLD AUTO: 0.74 THOUSAND/UL (ref 0–1.22)
MONOCYTES NFR BLD: 8 % (ref 4–12)
NEUTROPHILS # BLD MANUAL: 4.78 THOUSAND/UL (ref 1.85–7.62)
NEUTS BAND NFR BLD MANUAL: 1 % (ref 0–8)
NEUTS SEG NFR BLD AUTO: 51 % (ref 43–75)
NRBC BLD AUTO-RTO: 0 /100 WBCS
PLATELET # BLD AUTO: 291 THOUSANDS/UL (ref 149–390)
PLATELET BLD QL SMEAR: ADEQUATE
PMV BLD AUTO: 10.4 FL (ref 8.9–12.7)
POLYCHROMASIA BLD QL SMEAR: PRESENT
POTASSIUM SERPL-SCNC: 3.5 MMOL/L (ref 3.5–5.3)
PROT SERPL-MCNC: 6.5 G/DL (ref 6.4–8.2)
RBC # BLD AUTO: 3.9 MILLION/UL (ref 3.81–5.12)
SODIUM SERPL-SCNC: 135 MMOL/L (ref 136–145)
TOTAL CELLS COUNTED SPEC: 100
VARIANT LYMPHS # BLD AUTO: 6 %
WBC # BLD AUTO: 9.19 THOUSAND/UL (ref 4.31–10.16)

## 2019-09-16 PROCEDURE — G0427 INPT/ED TELECONSULT70: HCPCS | Performed by: INTERNAL MEDICINE

## 2019-09-16 PROCEDURE — 83735 ASSAY OF MAGNESIUM: CPT | Performed by: PHYSICIAN ASSISTANT

## 2019-09-16 PROCEDURE — 80053 COMPREHEN METABOLIC PANEL: CPT | Performed by: PHYSICIAN ASSISTANT

## 2019-09-16 PROCEDURE — 99239 HOSP IP/OBS DSCHRG MGMT >30: CPT | Performed by: PHYSICIAN ASSISTANT

## 2019-09-16 PROCEDURE — 85007 BL SMEAR W/DIFF WBC COUNT: CPT | Performed by: PHYSICIAN ASSISTANT

## 2019-09-16 PROCEDURE — 85027 COMPLETE CBC AUTOMATED: CPT | Performed by: PHYSICIAN ASSISTANT

## 2019-09-16 RX ORDER — POTASSIUM CHLORIDE 20 MEQ/1
40 TABLET, EXTENDED RELEASE ORAL ONCE
Status: COMPLETED | OUTPATIENT
Start: 2019-09-16 | End: 2019-09-16

## 2019-09-16 RX ORDER — ACETAMINOPHEN 325 MG/1
650 TABLET ORAL EVERY 6 HOURS PRN
Qty: 30 TABLET | Refills: 0
Start: 2019-09-16

## 2019-09-16 RX ADMIN — LORATADINE 10 MG: 10 TABLET ORAL at 09:09

## 2019-09-16 RX ADMIN — ENOXAPARIN SODIUM 40 MG: 40 INJECTION SUBCUTANEOUS at 09:10

## 2019-09-16 RX ADMIN — POTASSIUM CHLORIDE 40 MEQ: 1500 TABLET, EXTENDED RELEASE ORAL at 09:09

## 2019-09-16 RX ADMIN — VANCOMYCIN HYDROCHLORIDE 250 MG: 5 INJECTION, POWDER, LYOPHILIZED, FOR SOLUTION INTRAVENOUS at 11:50

## 2019-09-16 RX ADMIN — Medication 250 MG: at 09:08

## 2019-09-16 RX ADMIN — VANCOMYCIN HYDROCHLORIDE 250 MG: 5 INJECTION, POWDER, LYOPHILIZED, FOR SOLUTION INTRAVENOUS at 05:25

## 2019-09-16 RX ADMIN — METRONIDAZOLE 500 MG: 500 INJECTION, SOLUTION INTRAVENOUS at 09:08

## 2019-09-16 NOTE — ASSESSMENT & PLAN NOTE
Present on admission with and Fever, tachycardia in the setting of abnormal urinalysis suggestive of UTI, however urine culture was negative, therefore new source of infection was pursued  Sepsis protocol initiated in the ER including IV fluid hydration and IV Levaquin  IV aztreonam also added on admission for broader coverage  IV Levaquin and IV aztreonam discontinued and switched to IV Rocephin on 9/10/19 per ID recommendations  IV flagyl added 9/12/19  Blood cultures (9/7) no growth at 5 days  Repeat blood cultures (9/10/19) negative x 4 days  Urine culture (9/7) negative  Repeat urine culture (9/10/19) showing no growth  Procalcitonin trended up initially, but now continues to trend down  CT abdomen pelvis with contrast: negative for gallstones or dilated ducts  RUQ Abdominal ultrasound:   IMPRESSION:  1  Mild hepatomegaly  2  Gallbladder wall thickening in a decompressed gallbladder  No gallstones  Positive sonographic Tijerina sign  HIDA scan would be useful for cholecystitis diagnosis  General surgery consult for concerns for possible choledocholithiasis/cholangitis  MRCP: unremarkable  HIDA scan unremarkable  EBV testing positive  Possible atypical presentation of mononucelosis / viral hepatitis? Will need outpatient follow up with oncology given lymphadenopathy and persistent fevers  Patient has been fever free for >24 hours at this point  Dr Ifeanyi Brand evaluated the patient and is ok with discontinuation of antibiotics at this time  She completed a full 10 day course of empiric antibiotic therapy as noted above  Will need outpatient follow up with PCP, Infectious disease, as well as oncology

## 2019-09-16 NOTE — ASSESSMENT & PLAN NOTE
Patient had a prolonged hospital stay as she continued to have fevers despite treatment with IV antibiotics  Infectious disease consult appreciated  Blood cultures (9/7) no growth at 5 days- repeat blood cultures (9/10) negative at 4 days  Urine culture (9/7) negative  Repeat urine culture (9/10) negative  Procalcitonin elevated at 0 47 initially,  trended up at 1 82, now continues to trend down  IV Levaquin and IV aztreonam discontinued on 9/10/19 and start on IV rocephin her ID recommendations  IV flagyl added on 9/12  ID also recommended to check for:   EBV - positive IgM- possible acute infection  CMV - negative  Lyme - negative  Ehrlichia - negative  CT abdomen pelvis with contrast:   Bilateral iliac chain and left inguinal lymphadenopathy, nonspecific, possibly infectious/inflammatory, although neoplastic etiologies cannot be excluded  GI consulted given bilirubin elevated from 2 6 , peaked at 6 6 on 9/11 now 2 0 today  RUQ/epigastric pain, nausea with decreased po intake--resolved  General surgery consult appreciated - MRCP is unremarkable,  HIDA scan is negative  CT chest/abdomen/pelvis with PO and IV contrast 9/15/19 given persistent fevers with no source: Multiple non-specific findings noted:  IMPRESSION:  1  Patchy opacities in the right middle lobe, lingula and bilateral lung bases likely representing atelectasis  Multifocal pneumonia is less likely given the appearance/distribution  Clinical and laboratory correlation is recommended  2  Prominent lymph nodes throughout the chest abdomen pelvis  Differential considerations include infectious/inflammatory etiologies as well as neoplastic  3  Small bilateral pleural effusions  4  Small pericardial effusion  5  Hepatosplenomegaly  6  Mild pericholecystic fluid without other findings to suggest acute cholecystitis   If there is right upper quadrant pain for consider further evaluation with right upper quadrant ultrasound      Overall, likely cause of persistent fevers, elevated LFTs and lymphadenopathy is mononucleosis  Will need outpatient workup by oncology, however due to slightly atypical presentation  Would also benefit from close outpatient follow up with infectious disease

## 2019-09-16 NOTE — ASSESSMENT & PLAN NOTE
A sodium level at 132 on admission  Initially thought most likely hypovolemic hyponatremia, as the patient continues to have decreased PO intake  IV fluids initiated with minimal improvement  Discontinued 9/13 in the setting of possible volume overload  - patient notes weight has increased  IV fluids discontinued 9/13  Today, sodium is 135  Recommend outpatient follow up with repeat BMP after discharge

## 2019-09-16 NOTE — ASSESSMENT & PLAN NOTE
Bilirubin elevated at 2 0 on admission which was thought to be due to sepsis, however this has trended up to 6 6 at max, now trending down again  GI consult appreciated  General surgery consult appreciated  MRCP and HIDA unremarkable

## 2019-09-16 NOTE — DISCHARGE SUMMARY
Discharge- Gina Mandujano 1965, 52 y o  female MRN: [id number]    Unit/Bed#: 410-01 Encounter: [id number]    Primary Care Provider: Ant Pro DO   Date and time admitted to hospital: 9/7/2019  4:45 PM        * Sepsis [facility])  Assessment & Plan  Present on admission with and Fever, tachycardia in the setting of abnormal urinalysis suggestive of UTI, however urine culture was negative, therefore new source of infection was pursued  Sepsis protocol initiated in the ER including IV fluid hydration and IV Levaquin  IV aztreonam also added on admission for broader coverage  IV Levaquin and IV aztreonam discontinued and switched to IV Rocephin on 9/10/19 per ID recommendations  IV flagyl added 9/12/19  Blood cultures (9/7) no growth at 5 days  Repeat blood cultures (9/10/19) negative x 4 days  Urine culture (9/7) negative  Repeat urine culture (9/10/19) showing no growth  Procalcitonin trended up initially, but now continues to trend down  CT abdomen pelvis with contrast: negative for gallstones or dilated ducts  RUQ Abdominal ultrasound:   IMPRESSION:  1  Mild hepatomegaly  2  Gallbladder wall thickening in a decompressed gallbladder  No gallstones  Positive sonographic Tijerina sign  HIDA scan would be useful for cholecystitis diagnosis  General surgery consult for concerns for possible choledocholithiasis/cholangitis  MRCP: unremarkable  HIDA scan unremarkable  EBV testing positive  Possible atypical presentation of mononucelosis / viral hepatitis? Will need outpatient follow up with oncology given lymphadenopathy and persistent fevers  Patient has been fever free for >24 hours at this point  Dr Johnny Palafox evaluated the patient and is ok with discontinuation of antibiotics at this time  She completed a full 10 day course of empiric antibiotic therapy as noted above  Will need outpatient follow up with PCP, Infectious disease, as well as oncology        Hydronephrosis  Assessment & Plan  CT abdomen and pelvis on admission :   IMPRESSION:  1  No urinary tract calculi  2   Bilateral hydronephrosis, mild on the right and moderate on the left, developing since a sonogram from 10/7/2016  Will follow urinary retention protocol  Check post void residuals - normal   Urology consultation appreciated- outpatient follow-up in office       Hyperbilirubinemia  Assessment & Plan  Bilirubin elevated at 2 0 on admission which was thought to be due to sepsis, however this has trended up to 6 6 at max, now trending down again  GI consult appreciated  General surgery consult appreciated  MRCP and HIDA unremarkable  Acute febrile illness  Assessment & Plan  Patient had a prolonged hospital stay as she continued to have fevers despite treatment with IV antibiotics  Infectious disease consult appreciated  Blood cultures (9/7) no growth at 5 days- repeat blood cultures (9/10) negative at 4 days  Urine culture (9/7) negative  Repeat urine culture (9/10) negative  Procalcitonin elevated at 0 47 initially,  trended up at 1 82, now continues to trend down  IV Levaquin and IV aztreonam discontinued on 9/10/19 and start on IV rocephin her ID recommendations  IV flagyl added on 9/12  ID also recommended to check for:   EBV - positive IgM- possible acute infection  CMV - negative  Lyme - negative  Ehrlichia - negative  CT abdomen pelvis with contrast:   Bilateral iliac chain and left inguinal lymphadenopathy, nonspecific, possibly infectious/inflammatory, although neoplastic etiologies cannot be excluded  GI consulted given bilirubin elevated from 2 6 , peaked at 6 6 on 9/11 now 2 0 today  RUQ/epigastric pain, nausea with decreased po intake--resolved  General surgery consult appreciated - MRCP is unremarkable,  HIDA scan is negative      CT chest/abdomen/pelvis with PO and IV contrast 9/15/19 given persistent fevers with no source: Multiple non-specific findings noted: IMPRESSION:  1  Patchy opacities in the right middle lobe, lingula and bilateral lung bases likely representing atelectasis  Multifocal pneumonia is less likely given the appearance/distribution  Clinical and laboratory correlation is recommended  2  Prominent lymph nodes throughout the chest abdomen pelvis  Differential considerations include infectious/inflammatory etiologies as well as neoplastic  3  Small bilateral pleural effusions  4  Small pericardial effusion  5  Hepatosplenomegaly  6  Mild pericholecystic fluid without other findings to suggest acute cholecystitis  If there is right upper quadrant pain for consider further evaluation with right upper quadrant ultrasound      Overall, likely cause of persistent fevers, elevated LFTs and lymphadenopathy is mononucleosis  Will need outpatient workup by oncology, however due to slightly atypical presentation  Would also benefit from close outpatient follow up with infectious disease  Hyponatremia  Assessment & Plan  A sodium level at 132 on admission  Initially thought most likely hypovolemic hyponatremia, as the patient continues to have decreased PO intake  IV fluids initiated with minimal improvement  Discontinued 9/13 in the setting of possible volume overload  - patient notes weight has increased  IV fluids discontinued 9/13  Today, sodium is 135  Recommend outpatient follow up with repeat BMP after discharge  Elevated d-dimer  Assessment & Plan  Likely in the setting of acute infection / sepsis  D-dimer level at 5,386   CT pe study negative for acute PE  Venous duplex study of the BLE negative for DVT  Acute cystitis without hematuriaresolved as of 9/16/2019  Assessment & Plan  UA results consistent with UTI, however urine cultures are negative x 2  Hydronephrosis, persistent fever, and flank pain thought to be early pyelonephritis      IV Levaquin and IV aztreonam discontinued and switched to IV rocephin on 9/10/19 per ID, IV flagyl added on 9/12 per ID recommendations  Discharging Physician / Practitioner: Víctor Fraga PA-C  PCP: Jennifer Diaz DO  Admission Date:   Admission Orders (From admission, onward)     Ordered        09/07/19 2052  Inpatient Admission (expected length of stay for this patient Order details is greater than two midnights)  Once                   Discharge Date: 09/16/19    Resolved Problems  Date Reviewed: 9/16/2019          Resolved    Acute cystitis without hematuria 9/16/2019     Resolved by  Víctor Frgaa PA-C          Consultations During Hospital Stay:  · Infectious disease  · Urology  · General surgery    Procedures Performed:   · none    Significant Findings / Test Results:   Xr Chest Pa & Lateral  Result Date: 9/7/2019  Impression: No active pulmonary disease  Workstation performed: [id number]     Mri Abdomen Wo Contrast And Mrcp  Result Date: 9/12/2019  Impression: Mild hepatomegaly  16 mm [place] hepatic lymph node is a nonspecific finding but could indicate underlying hepatitis  Tiny quantity of pericholecystic fluid no gallbladder wall thickening cholelithiasis or gallbladder distention to indicate cholecystitis  Mild retroperitoneal adenopathy better characterized on the recent CT scan from 2 days earlier  No biliary dilation  No choledocholithiasis  Workstation performed: [id number]     Ct Renal Stone Study Abdomen Pelvis Wo Contrast  Result Date: 9/7/2019  Impression: 1  No urinary tract calculi  2   Bilateral hydronephrosis, mild on the right and moderate on the left, developing since a sonogram from 10/7/2016  Workstation performed: [id number]     Us Right Upper Quadrant  Result Date: 9/11/2019  Impression: 1  Mild hepatomegaly  2  Gallbladder wall thickening in a decompressed gallbladder  No gallstones  Positive sonographic Tijerina sign  HIDA scan would be useful for cholecystitis diagnosis   Workstation performed: [id number]     Cta Ed Chest Pe Study  Result Date: 9/7/2019  Impression: 1  No evidence of pulmonary embolus  2   Subsegmental atelectasis in the right middle lobe and lingula  3   Mild mediastinal and hilar lymphadenopathy  Workstation performed: [id number]     Ct Abdomen Pelvis W Contrast  Result Date: 9/10/2019  Impression: Bilateral iliac chain and left inguinal lymphadenopathy, nonspecific, possibly infectious/inflammatory, although neoplastic etiologies cannot be excluded  Workstation performed: [id number]     Incidental Findings:   · none     Test Results Pending at Discharge (will require follow up):   · none     Outpatient Tests Requested:  · none    Complications:  none    Reason for Admission: sepsis    Hospital Course:     Althea Elise is a 52 y o  female patient who originally presented to the hospital on 9/7/2019 due to Fever - 9 weeks to 74 years (fever for 4 days and urinary frequency)    Initially, the patient was felt to have a UTI / pyelonephritis and started on IV Levaquin and IV aztreonam (due to concerns over penicillin allergy)  However, urine cultures came back negative, which prompted further infectious workup  Given the patient's elevated LFTs and bilirubin, cholecystitis or biliary obstruction was pursued, however, CT abdomen, RUQ ultrasound, MRCP and HIDA scan were all negative  Infectious disease evaluated the patient and performed workup for tick borne illness as well as Ebstein Judyann Sarah - which did come back positive IgM  She continued to have recurrent fevers despite broad spectrum antibiotic use, and right now, mononucleosis is our working diagnosis  However, given bilateral iliac and left inguinal lymphadenopathy noted on CT abdomen recommend outpatient oncology follow up for possible lymphoma  Patient understands and agrees to follow up with PCP soon after discharge, as well as infectious disease follow up  She should continue supportive care for mononucleosis       Please see above list of diagnoses and related plan for additional information  Condition at Discharge: good     Discharge Day Visit / Exam:   Subjective:  Patient is feeling better today  Less nausea  Loose stools are improving  Vitals: Blood Pressure: 115/73 (09/16/19 0731)  Pulse: 88 (09/16/19 0731)  Temperature: 98 3 °F (36 8 °C) (09/16/19 0432)  Temp Source: Oral (09/15/19 2300)  Respirations: 18 (09/16/19 0731)  Height: 5' 8" (172 7 cm) (09/07/19 2125)  Weight - Scale: 105 kg (232 lb) (09/16/19 0538)  SpO2: 94 % (09/16/19 0731)  Exam:   Physical Exam   Constitutional: She is oriented to person, place, and time  She appears well-developed and well-nourished  No distress  HENT:   Head: Normocephalic  Mouth/Throat: Oropharynx is clear and moist    Neck: Neck supple  Cervical lymphadenopathy noted  Cardiovascular: Normal rate, regular rhythm, normal heart sounds and intact distal pulses  No murmur heard  Pulmonary/Chest: Effort normal and breath sounds normal  No stridor  No respiratory distress  She has no wheezes  Abdominal: Soft  Bowel sounds are normal  She exhibits no distension  There is no tenderness  There is no guarding  Musculoskeletal: She exhibits no edema  Neurological: She is alert and oriented to person, place, and time  Skin: Skin is warm and dry  She is not diaphoretic  Psychiatric: She has a normal mood and affect  Her behavior is normal    Nursing note and vitals reviewed  Discharge instructions/Information to patient and family:   See after visit summary for information provided to patient and family  Provisions for Follow-Up Care:  See after visit summary for information related to follow-up care and any pertinent home health orders  Disposition:   Home        Planned Readmission: no     Discharge Statement:  I spent 50 minutes discharging the patient  This time was spent on the day of discharge  I had direct contact with the patient on the day of discharge   Greater than 50% of the total time was spent examining patient, answering all patient questions, arranging and discussing plan of care with patient as well as directly providing post-discharge instructions  Additional time then spent on discharge activities  Discharge Medications:  See after visit summary for reconciled discharge medications provided to patient and family        ** Please Note: This note has been constructed using a voice recognition system **

## 2019-09-16 NOTE — NURSING NOTE
D/c instructions given to patient  Learning verbally expressed  Patient in no apparent distress upon d/c

## 2019-09-16 NOTE — TELEMEDICINE
Consultation - Infectious Disease   Colby Thompson 52 y o  female MRN: [id number]  Unit/Bed#: 410-01 Encounter: [id number]      Inpatient consult to Infectious Diseases  Consult performed by: Jesse Perez MD  Consult ordered by: Zaina Carney MD            REQUIRED DOCUMENTATION:     1  This service was provided via Telemedicine  2  Provider located at Home  3  TeleMed provider: Jesse Perez MD   4  Identify all parties in room with patient during tele consult:  RN  5  After connecting through [employer], patient was identified by name and date of birth and assistant checked wristband  Patient was then informed that this was a Telemedicine visit and that the exam was being conducted confidentially over secure lines  My office door was closed  No one else was in the room  Patient acknowledged consent and understanding of privacy and security of the Telemedicine visit, and gave us permission to have the assistant stay in the room in order to assist with the history and to conduct the exam   I informed the patient that I have reviewed their record in Epic and presented the opportunity for them to ask any questions regarding the visit today  The patient agreed to participate  Assessment/Recommendations     78-year-old female presented with sepsis, possible UTI in the setting of nonobstructive hydronephrosis  Course complicated by persistent fever and elevated LFTs, now clinically improving  Suspect viral syndrome with EBV hepatitis and prolonged fever      1  Sepsis, present on admission, suspected based on fever, tachycardia, tachypnea  - Source of sepsis was suspected to be acute pyelonephritis however patient had no leukocytosis, bland urinalysis, negative cultures and persistent fever despite antibiotics  - Other sources on infection including acute cholecystitis ruled out  - Most likely cause was EBV infection  - Clinically improved, now afebrile     · Management as below     2   Prolonged fever  - Extensive work up with CT imaging and cultures was negative  Testing for tick borne infections negative  EBV VCA IgM was positive which is the likely cause of fever  - Non infectious causes include lymphoma given generalized lymphadenopathy however patient is now afebrile and clinically improved   - Non infectious causes like autoimmune disease less likely given lack of associated symptoms, unremarkable medical history  - Now afebrile     · Recommend discontinuing ceftriaxone and Flagyl and oral vancomycin  · Continue supportive care  · If patient has recurrent fever with worsening diarrhea then would check stool for C diff  Would also consult Oncology to rule out lymphoproliferative disease  · Recommend outpatient follow-up with ID  · Patient will need repeat abdominal imaging to follow up on lymphadenopathy     3  Complicated UTI/ new onset hydronephrosis  - Diagnosis suspected on initial presentation given fever, flank pain  - urinalysis with only trace pyuria and urine cultures are negative but were obtained after antibiotics  - Imaging shows non obstructive hydronephrosis that has since resolved  - Now asymptomatic     · Patient has completed 10 day course of empiric antibiotic therapy  Recommend discontinuing antibiotics  · Will need outpatient urology follow up     4  Elevated LFTs  - Patient had hyperbilirubinemia, elevated liver enzymes but no upper quadrant abdominal pain  - MRI abdomen and HIDA scan were negative for cholecystitis  - LFTs now improving  - Likely were secondary to sepsis and/or EBV hepatitis     · Continue supportive care     5  Thrombocytopenia  - Unclear cause, no baseline available, may be secondary to sepsis  - Now improving     · Continue to monitor     6   Acute diarrhea  - Developed following contrast use, now improving without leukocytosis or abdominal pain  - Low suspicion for c diff, stool for c diff not sent    · Recommend discontinuing oral vancomycin  · If diarrhea worsens, fever develops with abdominal pain then would check stool for C diff and start empiric oral vancomycin    Thank you for involving me in the care of your patient  Please call with questions, change in clinical status or if tests recommended above are abnormal      Discussed with the primary service  History     Reason for Consult: Persistent fever  HPI: Carla Arriaga is a 52y o  year old female with no significant medical history  She presented to the ER on 09/07 with onset of bilateral flank pain, left greater than right for 4-5 days and 2 days of fever with chills, nausea and discolored urine  She was noted to be in sepsis with fever, tachycardia and tachypnea on initial evaluation  A CT abdomen noted non obstructive hydronephrosis  Urinalysis was bland  She was treated empirically for pyelonephritis with iv aztreonam and levaquin which was subsequently switched to ceftriaxone  She remained febrile despite negative blood and urine cultures  Her LFTs also continued to worsen  A CT abdomen was repeated that noted intra abdominal lymphadenopathy  Ultrasound could not rule out acute cholecystitis however an MRCP and HIDA scan were unremarkable  Further work up revealed a positive EBV VCA IgM  Her jaundice and GI symptoms improved but on 9/14 and fever seemed to be resolving but she had another febrile spike to 102 5 on 9/14  A CT abdomen was repeated with contrast and showed no infectious focus  She states she had nausea and diarrhea after taking contrast but this is slowly improving  She has been afebrile since yesterday and she feels very fatigued but overall improved  Denies nausea, vomiting or rash and is tolerating antibiotics well  Infectious disease is being consulted for diagnostic work up and antibiotic management  Review of Systems  A [id number] point system-based review of systems is otherwise negative  PAST MEDICAL HISTORY:  History reviewed   No pertinent past medical history  Past Surgical History:   Procedure Laterality Date    APPENDECTOMY       SECTION      HYSTERECTOMY             FAMILY HISTORY:  Non-contributory    SOCIAL HISTORY:  Social History   /Civil Union  Social History     Substance and Sexual Activity   Alcohol Use Yes    Frequency: Monthly or less    Binge frequency: Never    Comment: occassional     Social History     Substance and Sexual Activity   Drug Use Never     Social History     Tobacco Use   Smoking Status Never Smoker   Smokeless Tobacco Never Used       ALLERGIES:  Allergies   Allergen Reactions    Amoxicillin-Pot Clavulanate Hives, Itching, Wheezing and Facial Swelling    Bee Venom Anaphylaxis    Celecoxib Rash       MEDICATIONS:  All current active medications have been reviewed      Physical Exam     Temp:  [98 3 °F (36 8 °C)-100 °F (37 8 °C)] 98 3 °F (36 8 °C)  HR:  [84-96] 88  Resp:  [18-24] 18  BP: (110-145)/(66-78) 115/73  SpO2:  [90 %-94 %] 94 %  Temp (24hrs), Av 3 °F (37 4 °C), Min:98 3 °F (36 8 °C), Max:100 °F (37 8 °C)  Current: Temperature: 98 3 °F (36 8 °C)    Intake/Output Summary (Last 24 hours) at 2019 1112  Last data filed at 2019 1047  Gross per 24 hour   Intake 470 ml   Output 2250 ml   Net -1780 ml         Physical exam findings reported by bedside and primary medical team staff    General Appearance:  Appearing tired but nontoxic, and in no distress, appears stated age   Head:  Normocephalic, without obvious abnormality, atraumatic   Eyes:  PERRL, conjunctiva pink and sclera anicteric, both eyes   Nose: Nares normal, mucosa normal, no drainage   Throat: Oropharynx moist without lesions; lips, mucosa, and tongue normal; teeth and gums normal   Neck: Supple, symmetrical, trachea midline, no adenopathy, no tenderness/mass/nodules   Back:   Symmetric, no curvature, ROM normal, no CVA tenderness   Lungs:   Clear to auscultation bilaterally, no audible wheezes, rhonchi and rales, respirations unlabored   Chest Wall:  No tenderness or deformity   Heart:  Regular rate and rhythm, S1, S2 normal, no murmur, rub or gallop   Abdomen:   Soft, non-tender, non-distended, positive bowel sounds, no masses, no organomegaly    No CVA tenderness   Extremities: Extremities normal, atraumatic, no cyanosis, clubbing or edema   Skin: Skin color, texture, turgor normal, no rashes or lesions  No draining wounds noted  Lymph nodes: Cervical, supraclavicular, and axillary nodes normal   Neurologic: Alert and oriented times 3, extremity strength 5/5 and symmetric       Invasive Devices:   Peripheral IV 09/15/19 Left;Ventral (anterior) Forearm (Active)   Site Assessment Clean;Dry; Intact 9/16/2019  9:26 AM   Dressing Type Securing device;Transparent 9/16/2019  9:26 AM   Line Status Flushed; Infusing 9/16/2019  9:26 AM   Dressing Status Clean;Dry; Intact 9/16/2019  9:26 AM       Labs, Imaging, & Other Studies     Lab Results:    I have personally reviewed pertinent labs  Results from last 7 days   Lab Units 09/16/19  0431 09/14/19  0433 09/13/19  0935   WBC Thousand/uL 9 19 9 12 10 30*   HEMOGLOBIN g/dL 10 6* 11 0* 10 9*   PLATELETS Thousands/uL 291 202 179     Results from last 7 days   Lab Units 09/16/19  0431 09/15/19  0524 09/14/19  0433   POTASSIUM mmol/L 3 5 3 4* 3 2*   CHLORIDE mmol/L 100 98* 98*   CO2 mmol/L 28 29 29   BUN mg/dL 5 4* 6   CREATININE mg/dL 0 84 0 73 0 86   EGFR ml/min/1 73sq m 78 93 76   CALCIUM mg/dL 7 8* 7 8* 7 7*   AST U/L 99* 116* 105*   ALT U/L 62 73 75   ALK PHOS U/L 466* 457* 395*     Results from last 7 days   Lab Units 09/10/19  1343 09/10/19  1210   BLOOD CULTURE  No Growth After 5 Days  No Growth After 5 Days  --    URINE CULTURE   --  No Growth <1000 cfu/mL       Imaging Studies:   I have personally reviewed pertinent imaging study reports and images in PACS  EKG, Pathology, and Other Studies:   I have personally reviewed pertinent reports      Counseling/Coordination of care: Total 70 minutes communication with the patient via telehealth  Labs, medical tests and imaging studies were independently reviewed by me as noted above in HPI and old records were obtained and summarized as noted above in HPI

## 2019-09-16 NOTE — UTILIZATION REVIEW
Continued Stay Review    Date: 9/12/19                    Current Patient Class: IP  Current Level of Care:  MED/SURG    HPI:53 y o  female initially admitted on 9/7 WITH SEPSIS    Assessment/Plan: PT FEELING WEAK AND STILL HAVING RECURRENT FEVERS  NOTICED URINE IS BROWN IN COLOR, STILL WITH NAUSEA AND ABDOMINAL DISCOMFORT  NOTES FATIGUE AND A SORE THROAT TODAY  JAUNDICE IS PRESENT  UA (+) BUT UCX NEG, LOOKING FOR NEW SOURCE OF INFECTION  ID CONSULTED AND ABX SWITCHED TO IV ROCEPHIN  IV FLAGYL ADDED TODAY  BCXS FROM 9/7 NEG TO DATE, REPEAT BCX 9/10 PENDING  REPEAT UCX 9/10 PENDING  PROCALCITONIN ELEVATED ON ADMISSION AND TRENDED UP, CONTINUE TO F/U  GI CONSULTED FOR POSSIBLE CHOLEDOCHOLITHIASIS/CHOLANGITIS  BILI ELEVATED ON ADMISSION, NOW TRENDED UP TO 6 3 AND JAUNDICED  MRCP DONE AND PENDING   UROLOGY CONSULTED FOR B/L HYDRONEPHROSIS, PVR'S DONE  EBV, CMV, LYME, EHRLICHIA PENDING       Intake/Output Summary (Last 24 hours) at 9/12/2019 1728  Last data filed at 9/12/2019 1119      Gross per 24 hour   Intake 300 ml   Output 1900 ml   Net -1600 ml        Pertinent Labs/Diagnostic Results:   Results from last 7 days   Lab Units 09/12/19  0524 09/11/19  0517   WBC Thousand/uL 8 36 7 28   HEMOGLOBIN g/dL 11 2* 10 9*   HEMATOCRIT % 34 8 34 3*   PLATELETS Thousands/uL 119* 97*   NEUTROS ABS Thousands/µL 3 15  --    BANDS PCT %  --   --      Results from last 7 days   Lab Units 09/12/19  0524   SODIUM mmol/L 131*   POTASSIUM mmol/L 3 3*   CHLORIDE mmol/L 97*   CO2 mmol/L 26   ANION GAP mmol/L 8   BUN mg/dL 6   CREATININE mg/dL 0 86   EGFR ml/min/1 73sq m 76   CALCIUM mg/dL 8 1*   MAGNESIUM mg/dL  --    PHOSPHORUS mg/dL  --      Results from last 7 days   Lab Units 09/12/19  0524 09/10/19  0517   AST U/L 169* 118*   ALT U/L 93* 68   ALK PHOS U/L 292* 258*   TOTAL PROTEIN g/dL 6 3* 6 4   ALBUMIN g/dL 2 1* 2 4*   TOTAL BILIRUBIN mg/dL 6 30* 6 00*   BILIRUBIN DIRECT mg/dL  --  4 91*     Results from last 7 days   Lab Units 19  0524 19  0517 09/10/19  0517   GLUCOSE RANDOM mg/dL 101 101 102     Results from last 7 days   Lab Units 19  0524 09/10/19  1344   PROCALCITONIN ng/ml 1 21* 1 82*     Results from last 7 days   Lab Units 09/10/19  1343 09/10/19  1210   BLOOD CULTURE  No Growth After 5 Days  No Growth After 5 Days  --    URINE CULTURE   --  No Growth <1000 cfu/mL     Results from last 7 days   Lab Units 19  0517   TOTAL COUNTED  100       MRCP  --- Mild hepatomegaly  16 mm [place] hepatic lymph node is a nonspecific finding but could indicate underlying hepatitis  Tiny quantity of pericholecystic fluid no gallbladder wall thickening cholelithiasis or gallbladder distention to indicate cholecystitis  Mild retroperitoneal adenopathy better characterized on the recent CT scan from 2 days earlier  No biliary dilation  No choledocholithiasis  Vital Signs:   Temp (24hrs), Av 4 °F (38 °C), Min:98 2 °F (36 8 °C), Max:103 2 °F (39 6 °C)     Temp:  [98 2 °F (36 8 °C)-103 2 °F (39 6 °C)] 101 8 °F (38 8 °C)  HR:  [83-96] 88  Resp:  [12-18] 12  BP: (123-142)/(66-75) 135/66  SpO2:  [92 %-98 %] 95 %  Body mass index is 36 2 kg/m²  Medications:   Scheduled Meds:   Current Facility-Administered Medications:  acetaminophen 650 mg Oral Q6H PRN   cefTRIAXone 2,000 mg Intravenous Q24H   enoxaparin 40 mg Subcutaneous Daily   HYDROmorphone 0 5 mg Intravenous Q4H PRN   loratadine 10 mg Oral Daily   metroNIDAZOLE 500 mg Intravenous Q8H   ondansetron 4 mg Intravenous Q6H PRN   saccharomyces boulardii 250 mg Oral BID   vancomycin 250 mg Oral Q6H [address]       Discharge Plan: TBD    Network Utilization Review Department  Phone: [phone]; Fax [phone]  [email or web address]  ATTENTION: Please call with any questions or concerns to [phone]  and carefully listen to the prompts so that you are directed to the right person     Send all requests for admission clinical reviews, approved or denied determinations and any other requests to fax [phone]   All voicemails are confidential

## 2019-09-17 LAB
CAMPYLOBACTER DNA SPEC NAA+PROBE: NORMAL
SALMONELLA DNA SPEC QL NAA+PROBE: NORMAL
SHIGA TOXIN STX GENE SPEC NAA+PROBE: NORMAL
SHIGELLA DNA SPEC QL NAA+PROBE: NORMAL

## 2019-09-17 NOTE — UTILIZATION REVIEW
Notification of Discharge  This is a Notification of Discharge from our facility [address]  Please be advised that this patient has been discharge from our facility  Below you will find the admission and discharge date and time including the patients disposition  PRESENTATION DATE: 9/7/2019  4:45 PM  OBS ADMISSION DATE:   IP ADMISSION DATE: 9/7/19 2052   DISCHARGE DATE: 9/16/2019  4:10 PM  DISPOSITION: Home/Self Care Home/Self Care   Admission Orders listed below:  Admission Orders (From admission, onward)     Ordered        09/07/19 2052  Inpatient Admission (expected length of stay for this patient Order details is greater than two midnights)  Once                   Please contact the UR Department if additional information is required to close this patient's authorization/case  [address]  Utilization Review Department  Phone: [phone]; Fax [phone]  [email or web address]  ATTENTION: Please call with any questions or concerns to [phone]  and carefully listen to the prompts so that you are directed to the right person  Send all requests for admission clinical reviews, approved or denied determinations and any other requests to fax [phone]   All voicemails are confidential

## 2019-09-18 LAB
ATRIAL RATE: 99 BPM
P AXIS: 30 DEGREES
PR INTERVAL: 130 MS
QRS AXIS: 83 DEGREES
QRSD INTERVAL: 84 MS
QT INTERVAL: 326 MS
QTC INTERVAL: 418 MS
T WAVE AXIS: 28 DEGREES
VENTRICULAR RATE: 99 BPM

## 2019-09-18 PROCEDURE — 93010 ELECTROCARDIOGRAM REPORT: CPT | Performed by: INTERNAL MEDICINE

## 2019-09-18 NOTE — UTILIZATION REVIEW
Continued Stay Review    Date: 09/15/2019                          Current Patient Class: Inpatient   Current Level of Care: Med/Surg    HPI:53 y o  female initially admitted on 09/07/2019    Assessment/Plan: Sepsis:  Now looking for new source of infection  Sepsis protocol initiated in the ER including IV fluid hydration and IV Levaquin  Patient continues to have fevers with a temperature of 102 5 overnight last night (9/14)  Infectious disease consultation  IV Levaquin and IV aztreonam discontinued and switched to IV Rocephin on 9/10/19 per ID recommendations  IV flagyl added 9/12/19  General surgery consult for concerns for possible  choledocholithiasis/cholangitis  MRCP: unremarkable  HIDA scan unremarkable  EBV testing positive  Possible atypical presentation of mononucelosis / viral hepatitis? Dr Juana Jhaveri is recommended to complete 7 days of IV antibiotics, transition to 10 days oral  However, the patient continues to spike fevers  Will need to follow-up with Dr Juana Jhaveri (ID) tomorrow  09/16/2019  DC order entered    09/16/2019 Progress Note ID:  Sepsis, present on admission, suspected based on fever, tachycardia, tachypnea  - Source of sepsis was suspected to be acute pyelonephritis however patient had no leukocytosis, bland urinalysis, negative cultures and persistent fever despite antibiotics  Other sources on infection including acute cholecystitis ruled out  Most likely cause was EBV infection  Prolonged fever:  Extensive work up with CT imaging and cultures was negative  Testing for tick borne infections negative  EBV VCA IgM was positive which is the likely cause of fever  Non infectious causes include lymphoma given generalized lymphadenopathy however patient is now afebrile and clinically improved  Non infectious causes like autoimmune disease less likely given lack of associated symptoms, unremarkable medical history    Recommend discontinuing ceftriaxone and Flagyl and oral vancomycin  Continue supportive care  If patient has recurrent fever with worsening diarrhea then would check stool for C diff  Would also consult Oncology to rule out lymphoproliferative disease  Recommend outpatient follow-up with ID  Patient will need repeat abdominal imaging to follow up on lymphadenopathy  Complicated UTI/ new onset hydronephrosis:   Diagnosis suspected on initial presentation given fever, flank pain    urinalysis with only trace pyuria and urine cultures are negative but were obtained after antibiotics  Imaging shows non obstructive hydronephrosis that has since resolved  Patient has completed 10 day course of empiric antibiotic therapy  Recommend discontinuing antibiotics  Will need outpatient urology follow up        Pertinent Labs/Diagnostic Results:   Results from last 7 days   Lab Units 09/16/19  0431 09/14/19 0433 09/13/19  0935 09/12/19  0524   WBC Thousand/uL 9 19 9 12 10 30* 8 36   HEMOGLOBIN g/dL 10 6* 11 0* 10 9* 11 2*   HEMATOCRIT % 32 1* 33 7* 33 9* 34 8   PLATELETS Thousands/uL 291 202 179 119*   NEUTROS ABS Thousands/µL  --  3 21 3 68 3 15   BANDS PCT % 1  --   --   --      Results from last 7 days   Lab Units 09/16/19  0431 09/15/19  0524 09/14/19  0433 09/13/19  0935 09/12/19  0524   SODIUM mmol/L 135* 133* 131* 132* 131*   POTASSIUM mmol/L 3 5 3 4* 3 2* 3 3* 3 3*   CHLORIDE mmol/L 100 98* 98* 98* 97*   CO2 mmol/L 28 29 29 25 26   ANION GAP mmol/L 7 6 4 9 8   BUN mg/dL 5 4* 6 6 6   CREATININE mg/dL 0 84 0 73 0 86 0 83 0 86   EGFR ml/min/1 73sq m 78 93 76 80 76   CALCIUM mg/dL 7 8* 7 8* 7 7* 7 8* 8 1*   MAGNESIUM mg/dL 2 2  --  2 5  --   --    PHOSPHORUS mg/dL  --   --  3 2  --   --      Results from last 7 days   Lab Units 09/16/19  0431 09/15/19  0524 09/14/19  0433 09/13/19  0935 09/12/19  0524   AST U/L 99* 116* 105* 154* 169*   ALT U/L 62 73 75 95* 93*   ALK PHOS U/L 466* 457* 395* 366* 292*   TOTAL PROTEIN g/dL 6 5 6 2* 6 3* 6 5 6 3*   ALBUMIN g/dL 2 0* 1 9* 1  9* 2 0* 2 1*   TOTAL BILIRUBIN mg/dL 1 40* 2 00* 2 50* 3 60* 6 30*     Results from last 7 days   Lab Units 09/16/19  0431 09/15/19  0524 09/14/19  0433 09/13/19  0935 09/12/19  0524   GLUCOSE RANDOM mg/dL 111 111 97 89 101     Results from last 7 days   Lab Units 09/14/19  0433 09/13/19  1159 09/12/19  0524   PROCALCITONIN ng/ml 0 57* 0 68* 1 21*     Results from last 7 days   Lab Units 09/14/19  0433   LACTIC ACID mmol/L 1 1     Results from last 7 days   Lab Units 09/15/19  1412   SALMONELLA SP PCR  None Detected   SHIGELLA SP/ENTEROINVASIVE E  COLI (EIEC)  None Detected   CAMPYLOBACTER SP (JEJUNI AND COLI)  None Detected   SHIGA TOXIN 1/SHIGA TOXIN 2  None Detected     Results from last 7 days   Lab Units 09/16/19  0431   TOTAL COUNTED  100     Vital Signs:   Date/Time  Temp  Pulse  Resp  BP  MAP (mmHg)  SpO2  O2 Device  Patient Position - Orthostatic VS   09/16/19 1527  99 °F (37 2 °C)                 09/16/19 15:07:46  100 7 °F (38 2 °C)Abnormal   90  18  120/73  89  96 %  None (Room air)  Lying   09/16/19 07:31:58    88  18  115/73  87  94 %       09/16/19 04:32:21  98 3 °F (36 8 °C)  84  18  110/68  82  92 %       09/16/19 0204              None (Room air)     09/15/19 23:51:02    96  24Abnormal   112/66  81  90 %       09/15/19 2300  100 °F (37 8 °C)                 09/15/19 19:36:54  99 6 °F (37 6 °C)  91  18  137/76  96  92 %       09/15/19 1759            92 %       09/15/19 15:06:24  98 9 °F (37 2 °C)  84  18  145/78  100  92 %  None (Room air)  Lying   09/15/19 11:56:46  99 7 °F (37 6 °C)  92  18  127/78  94  90 %       09/15/19 0831              None (Room air)     09/15/19 06:52:07  98 9 °F (37 2 °C)  94  18  124/72  89  93 %       09/15/19 05:18:02  98 7 °F (37 1 °C)  89  18  125/76  92  96 %  None (Room air)  Lying   09/15/19 02:36:21  98 °F (36 7 °C)  90        94 %       09/14/19 23:03:02  98 3 °F (36 8 °C)  82  18  124/77  93  92 %  None (Room air)  Lying   09/14/19 2057  99 1 °F (37 3 °C)                 09/14/19 19:09:20  102 5 °F (39 2 °C)Abnormal   96  18  141/80  100  97 %  Nasal cannula  Lying       Medications:   Scheduled Meds:   acetaminophen 650 mg Oral Q6H PRN   cefTRIAXone 2,000 mg Intravenous Q24H   enoxaparin 40 mg Subcutaneous Daily   HYDROmorphone 0 5 mg Intravenous Q4H PRN   loratadine 10 mg Oral Daily   metroNIDAZOLE 500 mg Intravenous Q8H   ondansetron 4 mg Intravenous Q6H PRN   saccharomyces boulardii 250 mg Oral BID   vancomycin 250 mg Oral Q6H [facility]       Discharge Plan: Home    Network Utilization Review Department  Phone: [phone]; Fax [phone]  [email or web address]  ATTENTION: Please call with any questions or concerns to [phone]  and carefully listen to the prompts so that you are directed to the right person  Send all requests for admission clinical reviews, approved or denied determinations and any other requests to fax [phone]   All voicemails are confidential

## 2019-09-24 NOTE — TELEPHONE ENCOUNTER
Pt called to schedule I was unable to accommodate as she is hoping to return to work 10/16/19 please assist in scheduling

## 2019-10-02 ENCOUNTER — TRANSCRIBE ORDERS (OUTPATIENT)
Dept: ADMINISTRATIVE | Facility: HOSPITAL | Age: 54
End: 2019-10-02

## 2019-10-02 ENCOUNTER — HOSPITAL ENCOUNTER (OUTPATIENT)
Dept: NON INVASIVE DIAGNOSTICS | Facility: HOSPITAL | Age: 54
Discharge: HOME | End: 2019-10-02
Payer: COMMERCIAL

## 2019-10-02 DIAGNOSIS — M79.605 LEFT LEG PAIN: Primary | ICD-10-CM

## 2019-10-02 DIAGNOSIS — M79.605 LEFT LEG PAIN: ICD-10-CM

## 2019-10-02 PROCEDURE — 93971 EXTREMITY STUDY: CPT

## 2019-10-03 PROCEDURE — 93971 EXTREMITY STUDY: CPT | Performed by: SURGERY

## 2019-10-08 ENCOUNTER — HOSPITAL ENCOUNTER (EMERGENCY)
Facility: HOSPITAL | Age: 54
Discharge: HOME | End: 2019-10-08
Attending: EMERGENCY MEDICINE | Admitting: EMERGENCY MEDICINE
Payer: COMMERCIAL

## 2019-10-08 ENCOUNTER — APPOINTMENT (EMERGENCY)
Dept: CT IMAGING | Facility: HOSPITAL | Age: 54
End: 2019-10-08
Payer: COMMERCIAL

## 2019-10-08 VITALS
TEMPERATURE: 97.6 F | SYSTOLIC BLOOD PRESSURE: 114 MMHG | HEIGHT: 68 IN | BODY MASS INDEX: 33.28 KG/M2 | HEART RATE: 76 BPM | WEIGHT: 219.58 LBS | OXYGEN SATURATION: 96 % | DIASTOLIC BLOOD PRESSURE: 71 MMHG | RESPIRATION RATE: 18 BRPM

## 2019-10-08 DIAGNOSIS — R10.9 ABDOMINAL PAIN: Primary | ICD-10-CM

## 2019-10-08 LAB
ALBUMIN SERPL BCP-MCNC: 3.7 G/DL (ref 3.5–5)
ALP SERPL-CCNC: 115 U/L (ref 46–116)
ALT SERPL W P-5'-P-CCNC: 48 U/L (ref 12–78)
ANION GAP SERPL CALCULATED.3IONS-SCNC: 11 MMOL/L (ref 4–13)
AST SERPL W P-5'-P-CCNC: 35 U/L (ref 5–45)
BASOPHILS # BLD AUTO: 0.04 THOUSANDS/ΜL (ref 0–0.1)
BASOPHILS NFR BLD AUTO: 1 % (ref 0–1)
BILIRUB SERPL-MCNC: 0.7 MG/DL (ref 0.2–1)
BILIRUB UR QL STRIP: NEGATIVE
BUN SERPL-MCNC: 14 MG/DL (ref 5–25)
CALCIUM SERPL-MCNC: 9.2 MG/DL (ref 8.3–10.1)
CHLORIDE SERPL-SCNC: 101 MMOL/L (ref 100–108)
CLARITY UR: CLEAR
CO2 SERPL-SCNC: 26 MMOL/L (ref 21–32)
COLOR UR: YELLOW
CREAT SERPL-MCNC: 0.99 MG/DL (ref 0.6–1.3)
EOSINOPHIL # BLD AUTO: 0.13 THOUSAND/ΜL (ref 0–0.61)
EOSINOPHIL NFR BLD AUTO: 2 % (ref 0–6)
ERYTHROCYTE [DISTWIDTH] IN BLOOD BY AUTOMATED COUNT: 14.7 % (ref 11.6–15.1)
GFR SERPL CREATININE-BSD FRML MDRD: 64 ML/MIN/1.73SQ M
GLUCOSE SERPL-MCNC: 103 MG/DL (ref 65–140)
GLUCOSE UR STRIP-MCNC: NEGATIVE MG/DL
HCT VFR BLD AUTO: 43.3 % (ref 34.8–46.1)
HGB BLD-MCNC: 13.5 G/DL (ref 11.5–15.4)
HGB UR QL STRIP.AUTO: NEGATIVE
IMM GRANULOCYTES # BLD AUTO: 0.01 THOUSAND/UL (ref 0–0.2)
IMM GRANULOCYTES NFR BLD AUTO: 0 % (ref 0–2)
KETONES UR STRIP-MCNC: NEGATIVE MG/DL
LACTATE SERPL-SCNC: 0.7 MMOL/L (ref 0.5–2)
LEUKOCYTE ESTERASE UR QL STRIP: NEGATIVE
LIPASE SERPL-CCNC: 155 U/L (ref 73–393)
LYMPHOCYTES # BLD AUTO: 2.21 THOUSANDS/ΜL (ref 0.6–4.47)
LYMPHOCYTES NFR BLD AUTO: 41 % (ref 14–44)
MAGNESIUM SERPL-MCNC: 2.2 MG/DL (ref 1.6–2.6)
MCH RBC QN AUTO: 27.1 PG (ref 26.8–34.3)
MCHC RBC AUTO-ENTMCNC: 31.2 G/DL (ref 31.4–37.4)
MCV RBC AUTO: 87 FL (ref 82–98)
MONOCYTES # BLD AUTO: 0.53 THOUSAND/ΜL (ref 0.17–1.22)
MONOCYTES NFR BLD AUTO: 10 % (ref 4–12)
NEUTROPHILS # BLD AUTO: 2.54 THOUSANDS/ΜL (ref 1.85–7.62)
NEUTS SEG NFR BLD AUTO: 46 % (ref 43–75)
NITRITE UR QL STRIP: NEGATIVE
NRBC BLD AUTO-RTO: 0 /100 WBCS
PH UR STRIP.AUTO: 6 [PH]
PLATELET # BLD AUTO: 282 THOUSANDS/UL (ref 149–390)
PMV BLD AUTO: 9.7 FL (ref 8.9–12.7)
POTASSIUM SERPL-SCNC: 3.9 MMOL/L (ref 3.5–5.3)
PROT SERPL-MCNC: 8.8 G/DL (ref 6.4–8.2)
PROT UR STRIP-MCNC: NEGATIVE MG/DL
RBC # BLD AUTO: 4.98 MILLION/UL (ref 3.81–5.12)
SODIUM SERPL-SCNC: 138 MMOL/L (ref 136–145)
SP GR UR STRIP.AUTO: 1.02 (ref 1–1.03)
TROPONIN I SERPL-MCNC: <0.02 NG/ML
UROBILINOGEN UR QL STRIP.AUTO: 0.2 E.U./DL
WBC # BLD AUTO: 5.46 THOUSAND/UL (ref 4.31–10.16)

## 2019-10-08 PROCEDURE — 85025 COMPLETE CBC W/AUTO DIFF WBC: CPT | Performed by: EMERGENCY MEDICINE

## 2019-10-08 PROCEDURE — 81003 URINALYSIS AUTO W/O SCOPE: CPT | Performed by: EMERGENCY MEDICINE

## 2019-10-08 PROCEDURE — 96361 HYDRATE IV INFUSION ADD-ON: CPT

## 2019-10-08 PROCEDURE — 83605 ASSAY OF LACTIC ACID: CPT | Performed by: EMERGENCY MEDICINE

## 2019-10-08 PROCEDURE — 74177 CT ABD & PELVIS W/CONTRAST: CPT

## 2019-10-08 PROCEDURE — 96374 THER/PROPH/DIAG INJ IV PUSH: CPT

## 2019-10-08 PROCEDURE — 80053 COMPREHEN METABOLIC PANEL: CPT | Performed by: EMERGENCY MEDICINE

## 2019-10-08 PROCEDURE — 83735 ASSAY OF MAGNESIUM: CPT | Performed by: EMERGENCY MEDICINE

## 2019-10-08 PROCEDURE — 99284 EMERGENCY DEPT VISIT MOD MDM: CPT | Performed by: EMERGENCY MEDICINE

## 2019-10-08 PROCEDURE — 84484 ASSAY OF TROPONIN QUANT: CPT | Performed by: EMERGENCY MEDICINE

## 2019-10-08 PROCEDURE — 96375 TX/PRO/DX INJ NEW DRUG ADDON: CPT

## 2019-10-08 PROCEDURE — 87040 BLOOD CULTURE FOR BACTERIA: CPT | Performed by: EMERGENCY MEDICINE

## 2019-10-08 PROCEDURE — 99284 EMERGENCY DEPT VISIT MOD MDM: CPT

## 2019-10-08 PROCEDURE — 83690 ASSAY OF LIPASE: CPT | Performed by: EMERGENCY MEDICINE

## 2019-10-08 PROCEDURE — 36415 COLL VENOUS BLD VENIPUNCTURE: CPT | Performed by: EMERGENCY MEDICINE

## 2019-10-08 PROCEDURE — 93005 ELECTROCARDIOGRAM TRACING: CPT

## 2019-10-08 RX ORDER — TRAMADOL HYDROCHLORIDE 50 MG/1
50 TABLET ORAL EVERY 6 HOURS PRN
COMMUNITY
Start: 2017-12-14 | End: 2020-06-09 | Stop reason: ALTCHOICE

## 2019-10-08 RX ORDER — ONDANSETRON 2 MG/ML
4 INJECTION INTRAMUSCULAR; INTRAVENOUS ONCE
Status: COMPLETED | OUTPATIENT
Start: 2019-10-08 | End: 2019-10-08

## 2019-10-08 RX ORDER — ONDANSETRON 4 MG/1
4 TABLET, ORALLY DISINTEGRATING ORAL EVERY 8 HOURS PRN
Qty: 20 TABLET | Refills: 0 | Status: SHIPPED | OUTPATIENT
Start: 2019-10-08 | End: 2019-10-14

## 2019-10-08 RX ORDER — FENTANYL CITRATE 50 UG/ML
50 INJECTION, SOLUTION INTRAMUSCULAR; INTRAVENOUS ONCE
Status: COMPLETED | OUTPATIENT
Start: 2019-10-08 | End: 2019-10-08

## 2019-10-08 RX ADMIN — ONDANSETRON 4 MG: 2 INJECTION INTRAMUSCULAR; INTRAVENOUS at 17:09

## 2019-10-08 RX ADMIN — FENTANYL CITRATE 50 MCG: 0.05 INJECTION, SOLUTION INTRAMUSCULAR; INTRAVENOUS at 17:11

## 2019-10-08 RX ADMIN — IOHEXOL 100 ML: 350 INJECTION, SOLUTION INTRAVENOUS at 17:50

## 2019-10-08 RX ADMIN — SODIUM CHLORIDE 1000 ML: 0.9 INJECTION, SOLUTION INTRAVENOUS at 17:13

## 2019-10-08 NOTE — ED PROVIDER NOTES
History  Chief Complaint   Patient presents with    Abdominal Pain     pt c/o right lower abdominal pain radiates to back since yesteday around 1300  denies fevers/n/v/d  pt recently dx with mono  took tramadol at 10      57-year-old female presents with onset of righ lower quadrant pain which is sharp and constant; it started yesterday evening  She is most comfortable when she flexes her leg the pain radiates to the right flank as well as to the rt inguinal crease  Fells worse if she attempts to sit up  She denies any trauma or falls  She has had no fevers for greater than 1 week  She denies any dysuria; she has had increased urinary frequency secondary to increasing her fluids but no urgency she denies any gross hematuria  She denies prior history of this type of pain  She has had no trauma or falls  She has had a complete hysterectomy and oophorectomy as well as an appendectomy there is no nausea vomiting diarrhea or change to her stools there is no history of change in pain with bowel movements she tried a tramadol tablet but it did seem to help much  She denies any back pain she has had no lower extremity edema  No lightheadedness  No chest pain shortness of breath or pleuritic pain  Prior to Admission Medications   Prescriptions Last Dose Informant Patient Reported?  Taking?   acetaminophen (TYLENOL) 325 mg tablet Not Taking at Unknown time  No No   Sig: Take 2 tablets (650 mg total) by mouth every 6 (six) hours as needed for mild pain, headaches or fever   Patient not taking: Reported on 10/8/2019   traMADol (ULTRAM) 50 mg tablet 10/8/2019 at Unknown time  Yes Yes   Sig: Take 50 mg by mouth every 6 (six) hours as needed      Facility-Administered Medications: None       Past Medical History:   Diagnosis Date    Mononucleosis 09/18/2019       Past Surgical History:   Procedure Laterality Date    APPENDECTOMY     [address] [facility] HYSTERECTOMY      1994       Family History Problem Relation Age of Onset    No Known Problems Mother     No Known Problems Sister     No Known Problems Sister     No Known Problems Maternal Aunt     No Known Problems Maternal Aunt     No Known Problems Paternal Aunt     Cancer Paternal Aunt     No Known Problems Paternal Aunt      I have reviewed and agree with the history as documented  Social History     Tobacco Use    Smoking status: Never Smoker    Smokeless tobacco: Never Used   Substance Use Topics    Alcohol use: Yes     Frequency: Monthly or less     Binge frequency: Never     Comment: occassional    Drug use: Never        Review of Systems   Constitutional: Positive for activity change and fatigue  Negative for appetite change, chills, diaphoresis and fever  HENT: Negative for congestion, ear pain, rhinorrhea, sneezing and sore throat  Eyes: Negative for discharge  Respiratory: Negative for cough and shortness of breath  Cardiovascular: Negative for chest pain and leg swelling  Gastrointestinal: Positive for abdominal pain (RLQ)  Negative for blood in stool, diarrhea, nausea and vomiting  Endocrine: Negative for polyuria  Genitourinary: Positive for flank pain (right) and frequency  Negative for decreased urine volume, dysuria, urgency, vaginal bleeding and vaginal discharge  Musculoskeletal: Negative for back pain and myalgias  Skin: Negative for rash  Neurological: Negative for dizziness, weakness, light-headedness, numbness and headaches  Hematological: Negative for adenopathy  Psychiatric/Behavioral: Negative for confusion  All other systems reviewed and are negative  Physical Exam  Physical Exam   Constitutional: She is oriented to person, place, and time  She appears well-developed  Non-toxic appearance  She does not appear ill  No distress  HENT:   Head: Normocephalic and atraumatic     Right Ear: External ear normal    Left Ear: External ear normal    Nose: Nose normal    Mouth/Throat: Oropharynx is clear and moist  No oropharyngeal exudate  Eyes: Pupils are equal, round, and reactive to light  Conjunctivae and EOM are normal  Right eye exhibits no discharge  Left eye exhibits no discharge  Neck: Normal range of motion  Neck supple  No midline or paraspinous tenderness   Cardiovascular: Normal rate, regular rhythm, normal heart sounds and intact distal pulses  Pulmonary/Chest: Effort normal and breath sounds normal  No respiratory distress  Abdominal: Soft  Bowel sounds are normal  She exhibits no distension and no mass  There is tenderness (RLQ)  There is no rebound and no guarding  Back no midline T or L spine; No CVA tenderness   Musculoskeletal: Normal range of motion  She exhibits no edema, tenderness or deformity  Neurological: She is alert and oriented to person, place, and time  No cranial nerve deficit or sensory deficit  She exhibits normal muscle tone  Coordination normal    Skin: Skin is warm and dry  She is not diaphoretic  Psychiatric: She has a normal mood and affect  Vitals reviewed        Vital Signs  ED Triage Vitals [10/08/19 1608]   Temperature Pulse Respirations Blood Pressure SpO2   97 6 °F (36 4 °C) 84 18 143/68 97 %      Temp Source Heart Rate Source Patient Position - Orthostatic VS BP Location FiO2 (%)   Temporal Monitor Lying Right arm --      Pain Score       7           Vitals:    10/08/19 1608 10/08/19 1700 10/08/19 1800 10/08/19 1900   BP: 143/68 114/74 121/67 114/71   Pulse: 84 74 74 76   Patient Position - Orthostatic VS: Lying Lying Lying Lying         Visual Acuity      ED Medications  Medications   sodium chloride 0 9 % bolus 1,000 mL (0 mL Intravenous Stopped 10/8/19 1932)   ondansetron (ZOFRAN) injection 4 mg (4 mg Intravenous Given 10/8/19 1709)   fentanyl citrate (PF) 100 MCG/2ML 50 mcg (50 mcg Intravenous Given 10/8/19 1711)   iohexol (OMNIPAQUE) 350 MG/ML injection (MULTI-DOSE) 100 mL (100 mL Intravenous Given 10/8/19 1750) Diagnostic Studies  Results Reviewed     Procedure Component Value Units Date/Time    Troponin I [[id number]]  (Normal) Collected:  10/08/19 1631    Lab Status:  Final result Specimen:  Blood from Arm, Right Updated:  10/08/19 1656     Troponin I <0 02 ng/mL     Lactic acid, plasma [[id number]]  (Normal) Collected:  10/08/19 1631    Lab Status:  Final result Specimen:  Blood from Arm, Right Updated:  10/08/19 1656     LACTIC ACID 0 7 mmol/L     Narrative:       Result may be elevated if tourniquet was used during collection      Comprehensive metabolic panel [[id number]]  (Abnormal) Collected:  10/08/19 1631    Lab Status:  Final result Specimen:  Blood from Arm, Right Updated:  10/08/19 1654     Sodium 138 mmol/L      Potassium 3 9 mmol/L      Chloride 101 mmol/L      CO2 26 mmol/L      ANION GAP 11 mmol/L      BUN 14 mg/dL      Creatinine 0 99 mg/dL      Glucose 103 mg/dL      Calcium 9 2 mg/dL      AST 35 U/L      ALT 48 U/L      Alkaline Phosphatase 115 U/L      Total Protein 8 8 g/dL      Albumin 3 7 g/dL      Total Bilirubin 0 70 mg/dL      eGFR 64 ml/min/1 73sq m     Narrative:       [place] guidelines for Chronic Kidney Disease (CKD):     Stage 1 with normal or high GFR (GFR > 90 mL/min/1 73 square meters)    Stage 2 Mild CKD (GFR = 60-89 mL/min/1 73 square meters)    Stage 3A Moderate CKD (GFR = 45-59 mL/min/1 73 square meters)    Stage 3B Moderate CKD (GFR = 30-44 mL/min/1 73 square meters)    Stage 4 Severe CKD (GFR = 15-29 mL/min/1 73 square meters)    Stage 5 End Stage CKD (GFR <15 mL/min/1 73 square meters)  Note: GFR calculation is accurate only with a steady state creatinine    Lipase [[id number]]  (Normal) Collected:  10/08/19 1631    Lab Status:  Final result Specimen:  Blood from Arm, Right Updated:  10/08/19 1649     Lipase 155 u/L     Magnesium [[id number]]  (Normal) Collected:  10/08/19 1631    Lab Status:  Final result Specimen:  Blood from Arm, Right Updated: 10/08/19 1649     Magnesium 2 2 mg/dL     UA w Reflex to Microscopic w Reflex to Culture [[id number]] Collected:  10/08/19 1632    Lab Status:  Final result Specimen:  Urine, Clean Catch Updated:  10/08/19 1640     Color, UA Yellow     Clarity, UA Clear     Specific [place], UA 1 020     pH, UA 6 0     Leukocytes, UA Negative     Nitrite, UA Negative     Protein, UA Negative mg/dl      Glucose, UA Negative mg/dl      Ketones, UA Negative mg/dl      Urobilinogen, UA 0 2 E U /dl      Bilirubin, UA Negative     Blood, UA Negative    CBC and differential [[id number]]  (Abnormal) Collected:  10/08/19 1631    Lab Status:  Final result Specimen:  Blood from Arm, Right Updated:  10/08/19 1639     WBC 5 46 Thousand/uL      RBC 4 98 Million/uL      Hemoglobin 13 5 g/dL      Hematocrit 43 3 %      MCV 87 fL      MCH 27 1 pg      MCHC 31 2 g/dL      RDW 14 7 %      MPV 9 7 fL      Platelets 204 Thousands/uL      nRBC 0 /100 WBCs      Neutrophils Relative 46 %      Immat GRANS % 0 %      Lymphocytes Relative 41 %      Monocytes Relative 10 %      Eosinophils Relative 2 %      Basophils Relative 1 %      Neutrophils Absolute 2 54 Thousands/µL      Immature Grans Absolute 0 01 Thousand/uL      Lymphocytes Absolute 2 21 Thousands/µL      Monocytes Absolute 0 53 Thousand/µL      Eosinophils Absolute 0 13 Thousand/µL      Basophils Absolute 0 04 Thousands/µL     Blood culture #1 [[id number]] Collected:  10/08/19 1631    Lab Status: In process Specimen:  Blood from Arm, Left Updated:  10/08/19 1636    Blood culture #2 [[id number]] Collected:  10/08/19 1631    Lab Status: In process Specimen:  Blood from Arm, Right Updated:  10/08/19 1636                 CT abdomen pelvis with contrast   Final Result by Bishop Anya MD (10/08 1822)      Status post appendectomy  No right lower quadrant inflammatory changes identified  No evidence for obstructive uropathy  Colonic diverticulosis without evidence for diverticulitis    Nonspecific fluid-filled loops of small bowel are noted  Remainder of the findings, as described above  Workstation performed: [id number]                    Procedures  ECG 12 Lead Documentation Only  Date/Time: 10/8/2019 4:30 PM  Performed by: Jeff Lawrence MD  Authorized by: Jeff Lawrence MD     Indications / Diagnosis:  Abdm pain  ECG reviewed by me, the ED Provider: yes    Patient location:  ED  Previous ECG:     Previous ECG:  Compared to current    Comparison ECG info:  9/7/19    Similarity:  No change  Rate:     ECG rate:  75    ECG rate assessment: normal    Rhythm:     Rhythm: sinus rhythm    QRS:     QRS axis:  Normal  Comments:      twi v1, v2 seen previously; no acute ischemic chagnes           ED Course  ED Course as of Oct 09 0041   Tue Oct 08, 2019   2000 Pain improved slightly extensively reviewed CT findings and labs  LFTs normalized, CBC now normal CT a/p only shows fliud filled bowel and multiple other chronic findings which are unchanged  2008 Too keep follow up appt with urology later in week                                  MDM  Number of Diagnoses or Management Options  Diagnosis management comments: Mdm:  Recent mononucleosis dx with RLQ pain hx of LAD and hepatosplenomegally; no left sided tenderness; splenic injury less likely; will proceed with CT a/p to eval for colitis, mesenteric adennitis,       Disposition  Final diagnoses:   Abdominal pain - RLQ etiology unclear     Time reflects when diagnosis was documented in both MDM as applicable and the Disposition within this note     Time User Action Codes Description Comment    10/8/2019  8:03 PM Yana Brewster [R10 9] Abdominal pain     10/8/2019  8:03 PM Dina Marquis [R10 9] Abdominal pain RLQ etiology unclear      ED Disposition     ED Disposition Condition Date/Time Comment    Discharge Stable Tue Oct 8, 2019  8:03 PM Saleem Thompson discharge to home/self care              Follow-up Information Follow up With Specialties Details Why Hermelinda [address], DO Family Medicine  If symptoms worsen [place] [phone] [facility]  [phone]            Discharge Medication List as of 10/8/2019  8:06 PM      START taking these medications    Details   ondansetron (ZOFRAN-ODT) 4 mg disintegrating tablet Take 1 tablet (4 mg total) by mouth every 8 (eight) hours as needed for nausea or vomiting for up to 20 doses, Starting Tue 10/8/2019, Print         CONTINUE these medications which have NOT CHANGED    Details   traMADol (ULTRAM) 50 mg tablet Take 50 mg by mouth every 6 (six) hours as needed, Starting Thu 12/14/2017, Historical Med      acetaminophen (TYLENOL) 325 mg tablet Take 2 tablets (650 mg total) by mouth every 6 (six) hours as needed for mild pain, headaches or fever, Starting Mon 9/16/2019, No Print           No discharge procedures on file      ED Provider  Electronically Signed by           Mallika Carrasco MD  10/09/19 1077

## 2019-10-08 NOTE — CASE MANAGEMENT
I self referred the patient do to her recent discharge from [address] [place] [address] on 9/16/2019  The patient stated she felt good when she was discharged  Today she is being seen for a different reason  I discussed resources with the patient and she denied needing any help at this time  The patient lives in a bilevel house with her   There is five steps to go upstairs five steps to go downstairs  She has a shower chair she uses  She does not receive meals on wheels or home health services at this time  She takes care of her own ADL's and she drives  She uses [email or web address] in [place]  She has no trouble getting or paying for her medications  She has Chrised Drew  The patient stated she called her PCP prior to coming to the ED today

## 2019-10-09 LAB
ATRIAL RATE: 75 BPM
P AXIS: 45 DEGREES
PR INTERVAL: 164 MS
QRS AXIS: 81 DEGREES
QRSD INTERVAL: 86 MS
QT INTERVAL: 376 MS
QTC INTERVAL: 419 MS
T WAVE AXIS: 60 DEGREES
VENTRICULAR RATE: 75 BPM

## 2019-10-09 PROCEDURE — 93010 ELECTROCARDIOGRAM REPORT: CPT | Performed by: INTERNAL MEDICINE

## 2019-10-09 NOTE — DISCHARGE INSTRUCTIONS
Plenty of fliuds  Zofran as needed    Tylenol 650mg every 6 hours as needed for pain, fever (max 3000mg in 24 hours)   [place] diet - bannas rice, applesauce toast,   Avoid fiber, peas, corn, raw veggis for 1 week

## 2019-10-10 ENCOUNTER — OFFICE VISIT (OUTPATIENT)
Dept: UROLOGY | Facility: CLINIC | Age: 54
End: 2019-10-10
Payer: COMMERCIAL

## 2019-10-10 VITALS
HEART RATE: 68 BPM | BODY MASS INDEX: 33.12 KG/M2 | DIASTOLIC BLOOD PRESSURE: 70 MMHG | WEIGHT: 217.81 LBS | SYSTOLIC BLOOD PRESSURE: 116 MMHG

## 2019-10-10 DIAGNOSIS — N39.0 URINARY TRACT INFECTION WITHOUT HEMATURIA, SITE UNSPECIFIED: Primary | ICD-10-CM

## 2019-10-10 DIAGNOSIS — N13.30 HYDRONEPHROSIS, UNSPECIFIED HYDRONEPHROSIS TYPE: ICD-10-CM

## 2019-10-10 LAB
POST-VOID RESIDUAL VOLUME, ML POC: 29 ML
SL AMB  POCT GLUCOSE, UA: NORMAL
SL AMB LEUKOCYTE ESTERASE,UA: NORMAL
SL AMB POCT BILIRUBIN,UA: NORMAL
SL AMB POCT BLOOD,UA: NORMAL
SL AMB POCT CLARITY,UA: CLEAR
SL AMB POCT COLOR,UA: YELLOW
SL AMB POCT KETONES,UA: NORMAL
SL AMB POCT NITRITE,UA: NORMAL
SL AMB POCT PH,UA: 5
SL AMB POCT SPECIFIC GRAVITY,UA: 1.03
SL AMB POCT URINE PROTEIN: NORMAL
SL AMB POCT UROBILINOGEN: 0.2

## 2019-10-10 PROCEDURE — 51798 US URINE CAPACITY MEASURE: CPT | Performed by: PHYSICIAN ASSISTANT

## 2019-10-10 PROCEDURE — 81002 URINALYSIS NONAUTO W/O SCOPE: CPT | Performed by: PHYSICIAN ASSISTANT

## 2019-10-10 PROCEDURE — 99243 OFF/OP CNSLTJ NEW/EST LOW 30: CPT | Performed by: PHYSICIAN ASSISTANT

## 2019-10-10 NOTE — PROGRESS NOTES
10/10/2019      Chief Complaint   Patient presents with    Urinary Tract Infection         Assessment and Plan    52 y o  female managed by NEW PATIENT    1  Bilateral hydronephrosis  - mild right, moderate left developing since 2016  - bilateral parapelvic cysts  - hydro has sponteaneously resolved between CT scan 9/10/19 and 9/15/19 and remains resolved 10/8/19  - asymptomatic of any flank pain, hematuria, or urinary tract infections  - urine culture x2 and blood cultures x6 no growth in the past month  - urine dip negative today  - PVR 29 mL today  - normal renal function with GFR>60 from 2016 to current    Renal US in 6 months to evaluate for recurrence of hydronephrosis  Especially if develops true urinary tract infections would consider a voiding cystourethrogram to evaluate for vesicoureteral reflux  Parapelvic cysts are an additional source for potential obstruction though the size and location of the cysts appear grossly unchanged on my review of recent imaging  Currently asymptomatic of any  symptoms  History of Present Illness  Geetha Rela is a 52 y o  female here for evaluation of hospital referral for hydronephrosis  She has been asymptomatic of any genitourinary symptoms over the past several years, no reported urinary tract infections or hematuria  She has no flank pain, she has never had any stones  There is bilateral hydronephrosis seen developing since CT in 2016, without obstructing source identified  She was admitted for sepsis last month, with initially suspected to be urinary tract infection but cultures were no growth  She was having persistent fevers for about a month, ultimately she underwent a broad infectious workup while hospitalized negative galbladder workup including MRCP (no hydro seen on MRI just simple parapelvic cysts) and her final diagnosis ultimately was mononucleosis    She is finally recovering and feeling back to normal   Interestingly enough she had a CT performed two days ago for some transient right lower quadrant pain, and the updated imaging shows resolution of previously seen hydronephrosis and only some residual mild splenomegaly and lymphadenopathy  LFTs have normalized  Urine testing has been negative again  Review of Systems   Constitutional: Negative for activity change, appetite change, chills, fatigue, fever and unexpected weight change  HENT: Negative  Respiratory: Negative  Negative for shortness of breath  Cardiovascular: Negative  Negative for chest pain  Gastrointestinal: Negative for abdominal pain, diarrhea, nausea and vomiting  Endocrine: Negative  Genitourinary: Negative for decreased urine volume, difficulty urinating, dysuria, flank pain, frequency, hematuria and urgency  Musculoskeletal: Negative for back pain, gait problem, neck pain and neck stiffness  Skin: Negative  Negative for rash  Allergic/Immunologic: Negative  Neurological: Negative  Negative for tremors, syncope and headaches  Hematological: Negative for adenopathy  Does not bruise/bleed easily  Urinary Incontinence Screening      Most Recent Value   Urinary Incontinence   Urinary Incontinence? No   Incomplete emptying? No   Urinary frequency? No   Urinary urgency? No   Urinary hesitancy? No   Dysuria (painful difficult urination)? No   Nocturia (waking up to use the bathroom)? No   Straining (having to push to go)? No   Weak stream?  No   Intermittent stream?  No   Post void dribbling?   No               Past Medical History  Past Medical History:   Diagnosis Date    Mononucleosis 2019     Past Social History  Past Surgical History:   Procedure Laterality Date    APPENDECTOMY       SECTION      HYSTERECTOMY           Social History     Tobacco Use   Smoking Status Never Smoker   Smokeless Tobacco Never Used     Past Family History  Family History   Problem Relation Age of Onset    No Known Problems Mother  No Known Problems Sister     No Known Problems Sister     No Known Problems Maternal Aunt     No Known Problems Maternal Aunt     No Known Problems Paternal Aunt     Cancer Paternal Aunt     No Known Problems Paternal Aunt      Past Social history  Social History     Socioeconomic History    Marital status: /Civil Union     Spouse name: Not on file    Number of children: Not on file    Years of education: Not on file    Highest education level: Not on file   Occupational History    Not on file   Social Needs    Financial resource strain: Not on file    Food insecurity:     Worry: Not on file     Inability: Not on file    Transportation needs:     Medical: Not on file     Non-medical: Not on file   Tobacco Use    Smoking status: Never Smoker    Smokeless tobacco: Never Used   Substance and Sexual Activity    Alcohol use: Yes     Frequency: Monthly or less     Binge frequency: Never     Comment: occassional    Drug use: Never    Sexual activity: Yes     Partners: Male   Lifestyle    Physical activity:     Days per week: Not on file     Minutes per session: Not on file    Stress: Not on file   Relationships    Social connections:     Talks on phone: Not on file     Gets together: Not on file     Attends Shinto service: Not on file     Active member of club or organization: Not on file     Attends meetings of clubs or organizations: Not on file     Relationship status: Not on file    Intimate partner violence:     Fear of current or ex partner: Not on file     Emotionally abused: Not on file     Physically abused: Not on file     Forced sexual activity: Not on file   Other Topics Concern    Not on file   Social History Narrative    Not on file     Current Medications  Current Outpatient Medications   Medication Sig Dispense Refill    acetaminophen (TYLENOL) 325 mg tablet Take 2 tablets (650 mg total) by mouth every 6 (six) hours as needed for mild pain, headaches or fever 30 tablet 0    traMADol (ULTRAM) 50 mg tablet Take 50 mg by mouth every 6 (six) hours as needed      ondansetron (ZOFRAN-ODT) 4 mg disintegrating tablet Take 1 tablet (4 mg total) by mouth every 8 (eight) hours as needed for nausea or vomiting for up to 20 doses (Patient not taking: Reported on 10/10/2019) 20 tablet 0     No current facility-administered medications for this visit  Allergies  Allergies   Allergen Reactions    Amoxicillin-Pot Clavulanate Hives, Itching, Wheezing and Facial Swelling    Bee Venom Anaphylaxis    Celecoxib Rash         The following portions of the patient's history were reviewed and updated as appropriate: allergies, current medications, past medical history, past social history, past surgical history and problem list       Vitals  Vitals:    10/10/19 0906   BP: 116/70   Pulse: 68   Weight: 98 8 kg (217 lb 13 oz)       Physical Exam   Constitutional: She is oriented to person, place, and time  She appears well-developed and well-nourished  No distress  HENT:   Head: Normocephalic and atraumatic  Cardiovascular: Normal rate and regular rhythm  Pulmonary/Chest: Effort normal and breath sounds normal    Abdominal: Soft  Bowel sounds are normal  She exhibits no distension  There is no tenderness  Musculoskeletal: She exhibits no edema or tenderness  Neurological: She is alert and oriented to person, place, and time  Gait normal    Skin: Skin is warm and dry  Capillary refill takes less than 2 seconds  No rash noted  She is not diaphoretic  No erythema  No pallor  Psychiatric: She has a normal mood and affect  Her speech is normal and behavior is normal    Nursing note and vitals reviewed          Results  Recent Results (from the past 1 hour(s))   POCT Measure PVR    Collection Time: 10/10/19  9:14 AM   Result Value Ref Range    POST-VOID RESIDUAL VOLUME, ML POC 29 mL   POCT urine dip    Collection Time: 10/10/19  9:16 AM   Result Value Ref Range    LEUKOCYTE ESTERASE,UA - NITRITE,UA -     SL AMB POCT UROBILINOGEN 0 2     POCT URINE PROTEIN -      PH,UA 5 0     [address] [facility] 1 [phone] -     GLUCOSE, UA -      COLOR,UA yellow     CLARITY,UA clear    ]  No results found for: PSA  Lab Results   Component Value Date    CALCIUM 9 2 10/08/2019    K 3 9 10/08/2019    CO2 26 10/08/2019     10/08/2019    BUN 14 10/08/2019    CREATININE 0 99 10/08/2019     Lab Results   Component Value Date    WBC 5 46 10/08/2019    HGB 13 5 10/08/2019    HCT 43 3 10/08/2019    MCV 87 10/08/2019     10/08/2019         Orders  Orders Placed This Encounter   Procedures    US retroperitoneal complete     Standing Status:   Future     Standing Expiration Date:   10/10/2023     Scheduling Instructions:      13 years and Up - 1)  Full bladder required  2)  Please drink 24-32 oz of water 1 hour prior to appointment time  3)  No voiding 1 hour prior to appointment time  "There are no restrictions with eating unless thepatient is also for an Abdomen, Aorta or Renal Artery Doppler study, then refer to that prep  Assessments for kidneys require you to have a FULL bladder  Patient must drink 24 oz of water 60 minutes before yourscheduled appointment time  Please do not urinate 1 hour before your appointment time  Patient is not to urinate until their Ultrasound is completed  Bladder filling is a key part of this study and needs to be full foraccurate imaging during this exam             Please bring your insurance cards, a form of photo ID and a list of your medications with you  Arrive 15 minutes prior to your appointment time in order toregister "            To schedule this appointment, please contact Central Scheduling at [phone]  Order Specific Question:   Is a Renal Artery Doppler also being requested in addition to the Kidney/Renal ultrasound ?      Answer:   No    POCT urine dip    POCT Measure PVR

## 2019-10-11 NOTE — PROGRESS NOTES
FOLLOW UP - Infectious Disease   Colby Thompson 52 y o  female MRN: [id number]  Unit/Bed#:  Encounter: [id number]      IMPRESSION & RECOMMENDATIONS:     52-year-old female presented with sepsis, possible UTI in the setting of nonobstructive hydronephrosis  Course complicated by persistent fever and elevated LFTs, suspect viral syndrome with EBV hepatitis and prolonged fever      1  Sepsis, present on admission, suspected based on fever, tachycardia, tachypnea  - Source of sepsis was suspected to be acute pyelonephritis however patient had no leukocytosis, bland urinalysis, negative cultures and persistent fever despite antibiotics  - Other sources on infection including acute cholecystitis which was ruled out  - Most likely cause was EBV infection  - Clinically improved, now afebrile     · Management as below     2  Prolonged fever, now resolved  - Extensive work up with CT imaging and cultures was negative  Testing for tick borne infections negative  EBV VCA IgM was positive which is the likely cause of fever  - Non infectious causes include lymphoma given generalized lymphadenopathy however patient is now afebrile and clinically improved   - Non infectious causes like autoimmune disease less likely given lack of associated symptoms, unremarkable medical history  - Now afebrile and asymptomatic     · Continue supportive care     3  Complicated UTI/ new onset hydronephrosis, now resolved  - Imaging shows non obstructive hydronephrosis that has since resolved, patient's initial urinalysis was bland with negative cultures   - CT imaging notes parapelvic cysts  - Now asymptomatic      · Patient has outpatient urology follow up with plan for repeat imaging and r/o VUR if symptoms persist     4   Abdominal pain, intra abdominal lymphadenopathy  - Abdominal pain is improving, intra-abdominal lymphadenopathy may have been secondary to EBV infection    · Patient will need repeat abdominal imaging to follow up on lymphadenopathy in 6-8 weeks, to be ordered by patient's family physician  If lymphadenpathy is worsening would recommend oncology evaluation    5  Acute hepatitis and hyper bilirubinemia  - resolved, likely secondary to EBV and/or sepsis    HISTORY OF PRESENT ILLNESS:    HPI: Yessy Snow is a 52y o  year old female with no significant medical history who was recently admitted with sepsis, possible UTI in the setting of nonobstructive hydronephrosis  However patient had no pyuria or leukocytosis and urine cultures were negative  She was also noted to have elevated LFTs  Obstructive cholecystitis was ruled out and LFTs improved  Course was complicated by persistent fever of unknown origin and EBV serology was assessed and was positive to suggest acute infection  Supportive care was advised and the patient slowly improved with resolution of fever and elevated LFTs on discharge  She was seen in the emergency room on 10/08 with acute onset right lower quadrant abdominal pain  Workup with labs and imaging was unremarkable and she was discharged home with supportive care  INTERVAL HISTORY:  The patient has no complaints  She has not had a fever for over 2 weeks now  She states her abdominal pain is improving, no diarrhea or constipation  She continues to be fatigued but has been improving  Denies fevers, chills, or sweats  Denies nausea, vomiting, or rash  REVIEW OF SYSTEMS:  A complete 12 point system-based review of systems is otherwise negative      PAST MEDICAL HISTORY:  Past Medical History:   Diagnosis Date    Mononucleosis 2019     Past Surgical History:   Procedure Laterality Date    APPENDECTOMY       SECTION      HYSTERECTOMY             FAMILY HISTORY:  Non-contributory    SOCIAL HISTORY:  Social History   Social History     Substance and Sexual Activity   Alcohol Use Yes    Frequency: Monthly or less    Binge frequency: Never    Comment: occassional     Social History     Substance and Sexual Activity   Drug Use Never     Social History     Tobacco Use   Smoking Status Never Smoker   Smokeless Tobacco Never Used       ALLERGIES:  Allergies   Allergen Reactions    Amoxicillin-Pot Clavulanate Hives, Itching, Wheezing and Facial Swelling    Bee Venom Anaphylaxis    Celecoxib Rash       MEDICATIONS:  All current active medications have been reviewed  Antibiotics:    PHYSICAL EXAM:  Vitals:    10/14/19 0925   BP: 111/69   Pulse: 69   Temp: 98 2 °F (36 8 °C)   Weight: 98 kg (216 lb)   Height: 5' 8" (1 727 m)         General Appearance:  Appearing well, nontoxic, and in no distress   Head:  Normocephalic, without obvious abnormality, atraumatic   Eyes:  Conjunctiva pink and sclera anicteric, both eyes   Nose: Nares normal, mucosa normal, no drainage   Throat: Oropharynx moist without lesions   Neck: Supple, symmetrical, no adenopathy, no tenderness/mass/nodules   Back:   Symmetric, no curvature, ROM normal, no CVA tenderness   Lungs:   Clear to auscultation bilaterally, respirations unlabored   Chest Wall:  No tenderness or deformity   Heart:  RRR; no murmur, rub or gallop   Abdomen:   Soft, non-tender, non-distended, positive bowel sounds,    Extremities: No cyanosis, clubbing or edema   Skin: No rashes or lesions  No draining wounds noted  Lymph nodes: Cervical, supraclavicular nodes normal   Neurologic: Alert and oriented times 3, extremity strength 5/5 and symmetric       LABS, IMAGING, & OTHER STUDIES:  Lab Results:  I have personally reviewed pertinent labs    Lab Results   Component Value Date    K 3 9 10/08/2019     10/08/2019    CO2 26 10/08/2019    BUN 14 10/08/2019    CREATININE 0 99 10/08/2019    CALCIUM 9 2 10/08/2019    AST 35 10/08/2019    ALT 48 10/08/2019    ALKPHOS 115 10/08/2019    EGFR 64 10/08/2019     Lab Results   Component Value Date    WBC 5 46 10/08/2019    HGB 13 5 10/08/2019    HCT 43 3 10/08/2019    MCV 87 10/08/2019     10/08/2019 No results found for: Pancho Cintron  Results from last 7 days   Lab Units 10/08/19  1631   BLOOD CULTURE  No Growth After 5 Days  No Growth After 5 Days  Imaging Studies:   I have personally reviewed pertinent imaging study reports and images in PACS  Other Studies:   I have personally reviewed pertinent reports  Labs, medical tests and imaging studies were independently reviewed by me as noted above in HPI and old records were obtained and summarized as noted above in HPI

## 2019-10-13 LAB
BACTERIA BLD CULT: NORMAL
BACTERIA BLD CULT: NORMAL

## 2019-10-14 ENCOUNTER — OFFICE VISIT (OUTPATIENT)
Dept: INFECTIOUS DISEASES | Facility: CLINIC | Age: 54
End: 2019-10-14
Payer: COMMERCIAL

## 2019-10-14 VITALS
HEART RATE: 69 BPM | SYSTOLIC BLOOD PRESSURE: 111 MMHG | TEMPERATURE: 98.2 F | WEIGHT: 216 LBS | BODY MASS INDEX: 32.74 KG/M2 | HEIGHT: 68 IN | DIASTOLIC BLOOD PRESSURE: 69 MMHG

## 2019-10-14 DIAGNOSIS — R50.9 FEVER OF UNKNOWN ORIGIN: ICD-10-CM

## 2019-10-14 DIAGNOSIS — E80.6 HYPERBILIRUBINEMIA: ICD-10-CM

## 2019-10-14 DIAGNOSIS — B27.00 ACUTE EPSTEIN BARR VIRUS (EBV) INFECTION: ICD-10-CM

## 2019-10-14 DIAGNOSIS — N13.30 HYDRONEPHROSIS, UNSPECIFIED HYDRONEPHROSIS TYPE: Primary | ICD-10-CM

## 2019-10-14 PROCEDURE — 99214 OFFICE O/P EST MOD 30 MIN: CPT | Performed by: INTERNAL MEDICINE

## 2020-04-13 ENCOUNTER — TELEPHONE (OUTPATIENT)
Dept: UROLOGY | Facility: CLINIC | Age: 55
End: 2020-04-13

## 2020-04-21 ENCOUNTER — APPOINTMENT (OUTPATIENT)
Dept: RADIOLOGY | Facility: CLINIC | Age: 55
End: 2020-04-21
Payer: OTHER MISCELLANEOUS

## 2020-04-21 ENCOUNTER — OFFICE VISIT (OUTPATIENT)
Dept: URGENT CARE | Facility: CLINIC | Age: 55
End: 2020-04-21
Payer: OTHER MISCELLANEOUS

## 2020-04-21 VITALS
OXYGEN SATURATION: 99 % | SYSTOLIC BLOOD PRESSURE: 123 MMHG | TEMPERATURE: 98 F | WEIGHT: 216 LBS | DIASTOLIC BLOOD PRESSURE: 58 MMHG | HEIGHT: 68 IN | BODY MASS INDEX: 32.74 KG/M2 | HEART RATE: 60 BPM | RESPIRATION RATE: 16 BRPM

## 2020-04-21 DIAGNOSIS — M79.641 RIGHT HAND PAIN: ICD-10-CM

## 2020-04-21 DIAGNOSIS — S63.501A RIGHT WRIST SPRAIN, INITIAL ENCOUNTER: Primary | ICD-10-CM

## 2020-04-21 PROCEDURE — 73130 X-RAY EXAM OF HAND: CPT

## 2020-04-21 PROCEDURE — 73110 X-RAY EXAM OF WRIST: CPT

## 2020-04-21 PROCEDURE — 99213 OFFICE O/P EST LOW 20 MIN: CPT | Performed by: PHYSICIAN ASSISTANT

## 2020-04-22 ENCOUNTER — APPOINTMENT (OUTPATIENT)
Dept: URGENT CARE | Facility: CLINIC | Age: 55
End: 2020-04-22
Payer: OTHER MISCELLANEOUS

## 2020-04-22 PROCEDURE — 99213 OFFICE O/P EST LOW 20 MIN: CPT | Performed by: PHYSICIAN ASSISTANT

## 2020-04-27 ENCOUNTER — APPOINTMENT (OUTPATIENT)
Dept: URGENT CARE | Facility: CLINIC | Age: 55
End: 2020-04-27
Payer: OTHER MISCELLANEOUS

## 2020-04-27 PROCEDURE — 99213 OFFICE O/P EST LOW 20 MIN: CPT

## 2020-05-11 ENCOUNTER — APPOINTMENT (OUTPATIENT)
Dept: URGENT CARE | Facility: CLINIC | Age: 55
End: 2020-05-11
Payer: OTHER MISCELLANEOUS

## 2020-05-11 PROCEDURE — 99213 OFFICE O/P EST LOW 20 MIN: CPT | Performed by: NURSE PRACTITIONER

## 2020-05-27 ENCOUNTER — TRANSCRIBE ORDERS (OUTPATIENT)
Dept: ADMINISTRATIVE | Facility: HOSPITAL | Age: 55
End: 2020-05-27

## 2020-05-27 ENCOUNTER — APPOINTMENT (OUTPATIENT)
Dept: URGENT CARE | Facility: CLINIC | Age: 55
End: 2020-05-27
Payer: OTHER MISCELLANEOUS

## 2020-05-27 DIAGNOSIS — S63.501D SPRAIN OF FOREARM, RIGHT, SUBSEQUENT ENCOUNTER: Primary | ICD-10-CM

## 2020-05-27 PROCEDURE — 99214 OFFICE O/P EST MOD 30 MIN: CPT | Performed by: FAMILY MEDICINE

## 2020-06-09 ENCOUNTER — APPOINTMENT (OUTPATIENT)
Dept: RADIOLOGY | Facility: MEDICAL CENTER | Age: 55
End: 2020-06-09
Payer: OTHER MISCELLANEOUS

## 2020-06-09 ENCOUNTER — OFFICE VISIT (OUTPATIENT)
Dept: OBGYN CLINIC | Facility: CLINIC | Age: 55
End: 2020-06-09
Payer: OTHER MISCELLANEOUS

## 2020-06-09 VITALS
DIASTOLIC BLOOD PRESSURE: 75 MMHG | TEMPERATURE: 97.8 F | WEIGHT: 216 LBS | HEART RATE: 82 BPM | RESPIRATION RATE: 18 BRPM | HEIGHT: 68 IN | SYSTOLIC BLOOD PRESSURE: 115 MMHG | BODY MASS INDEX: 32.74 KG/M2

## 2020-06-09 DIAGNOSIS — S62.154A CLOSED NONDISPLACED FRACTURE OF HOOK PROCESS OF HAMATE OF RIGHT WRIST, INITIAL ENCOUNTER: Primary | ICD-10-CM

## 2020-06-09 DIAGNOSIS — S62.154A CLOSED NONDISPLACED FRACTURE OF HOOK PROCESS OF HAMATE OF RIGHT WRIST, INITIAL ENCOUNTER: ICD-10-CM

## 2020-06-09 PROCEDURE — 99203 OFFICE O/P NEW LOW 30 MIN: CPT | Performed by: ORTHOPAEDIC SURGERY

## 2020-06-09 PROCEDURE — 73100 X-RAY EXAM OF WRIST: CPT

## 2020-06-18 ENCOUNTER — OFFICE VISIT (OUTPATIENT)
Dept: OBGYN CLINIC | Facility: MEDICAL CENTER | Age: 55
End: 2020-06-18
Payer: OTHER MISCELLANEOUS

## 2020-06-18 VITALS — HEIGHT: 68 IN | RESPIRATION RATE: 18 BRPM | WEIGHT: 230 LBS | BODY MASS INDEX: 34.86 KG/M2 | TEMPERATURE: 98.1 F

## 2020-06-18 DIAGNOSIS — S62.154A CLOSED NONDISPLACED FRACTURE OF HOOK PROCESS OF HAMATE OF RIGHT WRIST, INITIAL ENCOUNTER: ICD-10-CM

## 2020-06-18 PROCEDURE — 99214 OFFICE O/P EST MOD 30 MIN: CPT | Performed by: ORTHOPAEDIC SURGERY

## 2020-06-18 RX ORDER — CEFAZOLIN SODIUM 2 G/50ML
2000 SOLUTION INTRAVENOUS ONCE
Status: CANCELLED | OUTPATIENT
Start: 2020-06-29 | End: 2020-06-18

## 2020-06-24 DIAGNOSIS — S62.154A CLOSED NONDISPLACED FRACTURE OF HOOK PROCESS OF HAMATE OF RIGHT WRIST, INITIAL ENCOUNTER: ICD-10-CM

## 2020-06-24 PROCEDURE — U0003 INFECTIOUS AGENT DETECTION BY NUCLEIC ACID (DNA OR RNA); SEVERE ACUTE RESPIRATORY SYNDROME CORONAVIRUS 2 (SARS-COV-2) (CORONAVIRUS DISEASE [COVID-19]), AMPLIFIED PROBE TECHNIQUE, MAKING USE OF HIGH THROUGHPUT TECHNOLOGIES AS DESCRIBED BY CMS-2020-01-R: HCPCS

## 2020-06-25 LAB — SARS-COV-2 RNA SPEC QL NAA+PROBE: NOT DETECTED

## 2020-06-26 ENCOUNTER — ANESTHESIA EVENT (OUTPATIENT)
Dept: PERIOP | Facility: HOSPITAL | Age: 55
End: 2020-06-26
Payer: OTHER MISCELLANEOUS

## 2020-06-29 ENCOUNTER — APPOINTMENT (OUTPATIENT)
Dept: RADIOLOGY | Facility: HOSPITAL | Age: 55
End: 2020-06-29
Payer: OTHER MISCELLANEOUS

## 2020-06-29 ENCOUNTER — HOSPITAL ENCOUNTER (OUTPATIENT)
Facility: HOSPITAL | Age: 55
Setting detail: OUTPATIENT SURGERY
Discharge: HOME | End: 2020-06-29
Attending: ORTHOPAEDIC SURGERY | Admitting: ORTHOPAEDIC SURGERY
Payer: OTHER MISCELLANEOUS

## 2020-06-29 ENCOUNTER — ANESTHESIA (OUTPATIENT)
Dept: PERIOP | Facility: HOSPITAL | Age: 55
End: 2020-06-29
Payer: OTHER MISCELLANEOUS

## 2020-06-29 VITALS
SYSTOLIC BLOOD PRESSURE: 119 MMHG | HEART RATE: 50 BPM | OXYGEN SATURATION: 95 % | RESPIRATION RATE: 16 BRPM | TEMPERATURE: 98.1 F | DIASTOLIC BLOOD PRESSURE: 56 MMHG

## 2020-06-29 DIAGNOSIS — S62.151K: Primary | ICD-10-CM

## 2020-06-29 PROCEDURE — 25645 OPTX CRPL FX OTH/THN SCPH EA: CPT | Performed by: ORTHOPAEDIC SURGERY

## 2020-06-29 PROCEDURE — 64719 REVISE ULNAR NERVE AT WRIST: CPT | Performed by: ORTHOPAEDIC SURGERY

## 2020-06-29 PROCEDURE — 73100 X-RAY EXAM OF WRIST: CPT

## 2020-06-29 RX ORDER — OXYCODONE HYDROCHLORIDE 5 MG/1
5 TABLET ORAL EVERY 4 HOURS PRN
Status: DISCONTINUED | OUTPATIENT
Start: 2020-06-29 | End: 2020-06-29 | Stop reason: HOSPADM

## 2020-06-29 RX ORDER — DEXAMETHASONE SODIUM PHOSPHATE 4 MG/ML
4 INJECTION, SOLUTION INTRA-ARTICULAR; INTRALESIONAL; INTRAMUSCULAR; INTRAVENOUS; SOFT TISSUE ONCE
Status: COMPLETED | OUTPATIENT
Start: 2020-06-29 | End: 2020-06-29

## 2020-06-29 RX ORDER — ONDANSETRON 2 MG/ML
4 INJECTION INTRAMUSCULAR; INTRAVENOUS ONCE AS NEEDED
Status: DISCONTINUED | OUTPATIENT
Start: 2020-06-29 | End: 2020-06-29 | Stop reason: HOSPADM

## 2020-06-29 RX ORDER — PROMETHAZINE HYDROCHLORIDE 25 MG/ML
12.5 INJECTION, SOLUTION INTRAMUSCULAR; INTRAVENOUS ONCE AS NEEDED
Status: DISCONTINUED | OUTPATIENT
Start: 2020-06-29 | End: 2020-06-29 | Stop reason: HOSPADM

## 2020-06-29 RX ORDER — OXYCODONE HYDROCHLORIDE 5 MG/1
5 TABLET ORAL EVERY 6 HOURS PRN
Qty: 10 TABLET | Refills: 0 | Status: SHIPPED | OUTPATIENT
Start: 2020-06-29 | End: 2020-07-09

## 2020-06-29 RX ORDER — DEXAMETHASONE SODIUM PHOSPHATE 10 MG/ML
INJECTION, SOLUTION INTRAMUSCULAR; INTRAVENOUS AS NEEDED
Status: DISCONTINUED | OUTPATIENT
Start: 2020-06-29 | End: 2020-06-29 | Stop reason: SURG

## 2020-06-29 RX ORDER — MIDAZOLAM HYDROCHLORIDE 2 MG/2ML
INJECTION, SOLUTION INTRAMUSCULAR; INTRAVENOUS AS NEEDED
Status: DISCONTINUED | OUTPATIENT
Start: 2020-06-29 | End: 2020-06-29 | Stop reason: SURG

## 2020-06-29 RX ORDER — ONDANSETRON 2 MG/ML
INJECTION INTRAMUSCULAR; INTRAVENOUS AS NEEDED
Status: DISCONTINUED | OUTPATIENT
Start: 2020-06-29 | End: 2020-06-29 | Stop reason: SURG

## 2020-06-29 RX ORDER — HYDROMORPHONE HCL/PF 1 MG/ML
0.5 SYRINGE (ML) INJECTION
Status: COMPLETED | OUTPATIENT
Start: 2020-06-29 | End: 2020-06-29

## 2020-06-29 RX ORDER — FENTANYL CITRATE 50 UG/ML
INJECTION, SOLUTION INTRAMUSCULAR; INTRAVENOUS AS NEEDED
Status: DISCONTINUED | OUTPATIENT
Start: 2020-06-29 | End: 2020-06-29 | Stop reason: SURG

## 2020-06-29 RX ORDER — PROPOFOL 10 MG/ML
INJECTION, EMULSION INTRAVENOUS AS NEEDED
Status: DISCONTINUED | OUTPATIENT
Start: 2020-06-29 | End: 2020-06-29 | Stop reason: SURG

## 2020-06-29 RX ORDER — CEFAZOLIN SODIUM 2 G/50ML
SOLUTION INTRAVENOUS AS NEEDED
Status: DISCONTINUED | OUTPATIENT
Start: 2020-06-29 | End: 2020-06-29 | Stop reason: SURG

## 2020-06-29 RX ORDER — MAGNESIUM HYDROXIDE 1200 MG/15ML
LIQUID ORAL AS NEEDED
Status: DISCONTINUED | OUTPATIENT
Start: 2020-06-29 | End: 2020-06-29 | Stop reason: HOSPADM

## 2020-06-29 RX ORDER — BUPIVACAINE HYDROCHLORIDE 2.5 MG/ML
INJECTION, SOLUTION EPIDURAL; INFILTRATION; INTRACAUDAL AS NEEDED
Status: DISCONTINUED | OUTPATIENT
Start: 2020-06-29 | End: 2020-06-29 | Stop reason: HOSPADM

## 2020-06-29 RX ORDER — CEFAZOLIN SODIUM 2 G/50ML
2000 SOLUTION INTRAVENOUS ONCE
Status: DISCONTINUED | OUTPATIENT
Start: 2020-06-29 | End: 2020-06-29 | Stop reason: HOSPADM

## 2020-06-29 RX ORDER — KETOROLAC TROMETHAMINE 30 MG/ML
30 INJECTION, SOLUTION INTRAMUSCULAR; INTRAVENOUS ONCE
Status: COMPLETED | OUTPATIENT
Start: 2020-06-29 | End: 2020-06-29

## 2020-06-29 RX ORDER — FENTANYL CITRATE/PF 50 MCG/ML
12.5 SYRINGE (ML) INJECTION
Status: DISCONTINUED | OUTPATIENT
Start: 2020-06-29 | End: 2020-06-29 | Stop reason: HOSPADM

## 2020-06-29 RX ORDER — SODIUM CHLORIDE, SODIUM LACTATE, POTASSIUM CHLORIDE, CALCIUM CHLORIDE 600; 310; 30; 20 MG/100ML; MG/100ML; MG/100ML; MG/100ML
50 INJECTION, SOLUTION INTRAVENOUS CONTINUOUS
Status: DISCONTINUED | OUTPATIENT
Start: 2020-06-29 | End: 2020-06-29 | Stop reason: HOSPADM

## 2020-06-29 RX ORDER — GLYCOPYRROLATE 0.2 MG/ML
INJECTION INTRAMUSCULAR; INTRAVENOUS AS NEEDED
Status: DISCONTINUED | OUTPATIENT
Start: 2020-06-29 | End: 2020-06-29 | Stop reason: SURG

## 2020-06-29 RX ADMIN — SODIUM CHLORIDE, SODIUM LACTATE, POTASSIUM CHLORIDE, AND CALCIUM CHLORIDE: .6; .31; .03; .02 INJECTION, SOLUTION INTRAVENOUS at 12:53

## 2020-06-29 RX ADMIN — GLYCOPYRROLATE 0.2 MG: 0.2 INJECTION, SOLUTION INTRAMUSCULAR; INTRAVENOUS at 12:50

## 2020-06-29 RX ADMIN — KETOROLAC TROMETHAMINE 30 MG: 30 INJECTION, SOLUTION INTRAMUSCULAR; INTRAVENOUS at 14:48

## 2020-06-29 RX ADMIN — HYDROMORPHONE HYDROCHLORIDE 0.5 MG: 1 INJECTION, SOLUTION INTRAMUSCULAR; INTRAVENOUS; SUBCUTANEOUS at 14:35

## 2020-06-29 RX ADMIN — HYDROMORPHONE HYDROCHLORIDE 0.5 MG: 1 INJECTION, SOLUTION INTRAMUSCULAR; INTRAVENOUS; SUBCUTANEOUS at 14:45

## 2020-06-29 RX ADMIN — HYDROMORPHONE HYDROCHLORIDE 0.5 MG: 1 INJECTION, SOLUTION INTRAMUSCULAR; INTRAVENOUS; SUBCUTANEOUS at 14:15

## 2020-06-29 RX ADMIN — FENTANYL CITRATE 25 MCG: 50 INJECTION INTRAMUSCULAR; INTRAVENOUS at 13:15

## 2020-06-29 RX ADMIN — ONDANSETRON HYDROCHLORIDE 4 MG: 2 INJECTION, SOLUTION INTRAMUSCULAR; INTRAVENOUS at 13:54

## 2020-06-29 RX ADMIN — MIDAZOLAM HYDROCHLORIDE 2 MG: 1 INJECTION, SOLUTION INTRAMUSCULAR; INTRAVENOUS at 12:50

## 2020-06-29 RX ADMIN — PROPOFOL 200 MG: 10 INJECTION, EMULSION INTRAVENOUS at 13:03

## 2020-06-29 RX ADMIN — HYDROMORPHONE HYDROCHLORIDE 0.5 MG: 1 INJECTION, SOLUTION INTRAMUSCULAR; INTRAVENOUS; SUBCUTANEOUS at 14:25

## 2020-06-29 RX ADMIN — DEXAMETHASONE SODIUM PHOSPHATE 4 MG: 10 INJECTION, SOLUTION INTRAMUSCULAR; INTRAVENOUS at 13:08

## 2020-06-29 RX ADMIN — CEFAZOLIN SODIUM 2000 MG: 2 SOLUTION INTRAVENOUS at 12:47

## 2020-06-29 RX ADMIN — FENTANYL CITRATE 25 MCG: 50 INJECTION INTRAMUSCULAR; INTRAVENOUS at 13:11

## 2020-06-29 RX ADMIN — FENTANYL CITRATE 25 MCG: 50 INJECTION INTRAMUSCULAR; INTRAVENOUS at 13:49

## 2020-06-29 RX ADMIN — FENTANYL CITRATE 12.5 MCG: 50 INJECTION, SOLUTION INTRAMUSCULAR; INTRAVENOUS at 15:16

## 2020-06-29 RX ADMIN — FENTANYL CITRATE 12.5 MCG: 50 INJECTION, SOLUTION INTRAMUSCULAR; INTRAVENOUS at 15:26

## 2020-06-29 RX ADMIN — FENTANYL CITRATE 25 MCG: 50 INJECTION INTRAMUSCULAR; INTRAVENOUS at 13:29

## 2020-06-29 RX ADMIN — LIDOCAINE HYDROCHLORIDE 50 MG: 20 INJECTION, SOLUTION INTRAVENOUS at 13:03

## 2020-06-29 RX ADMIN — DEXAMETHASONE SODIUM PHOSPHATE 4 MG: 4 INJECTION, SOLUTION INTRAMUSCULAR; INTRAVENOUS at 14:50

## 2020-06-29 RX ADMIN — FENTANYL CITRATE 12.5 MCG: 50 INJECTION, SOLUTION INTRAMUSCULAR; INTRAVENOUS at 15:36

## 2020-07-09 ENCOUNTER — OFFICE VISIT (OUTPATIENT)
Dept: OBGYN CLINIC | Facility: MEDICAL CENTER | Age: 55
End: 2020-07-09

## 2020-07-09 VITALS
BODY MASS INDEX: 35.16 KG/M2 | SYSTOLIC BLOOD PRESSURE: 119 MMHG | HEART RATE: 62 BPM | WEIGHT: 232 LBS | HEIGHT: 68 IN | TEMPERATURE: 97.8 F | DIASTOLIC BLOOD PRESSURE: 77 MMHG

## 2020-07-09 DIAGNOSIS — S62.154A CLOSED NONDISPLACED FRACTURE OF HOOK PROCESS OF HAMATE OF RIGHT WRIST, INITIAL ENCOUNTER: Primary | ICD-10-CM

## 2020-07-09 PROCEDURE — 99024 POSTOP FOLLOW-UP VISIT: CPT | Performed by: ORTHOPAEDIC SURGERY

## 2020-07-09 NOTE — PROGRESS NOTES
History of the Present Illness     Yoana Mayes is a 62 y o  female presents today for her first post operative appointment for her right wrist  She is now 10 days s/p excision of hook of hamate fracture and guyon canal release performed on 6/29/2020  Patient states that she is doing well postoperatively  She notes some minimal pain about the wrist, hand and fingers on a daily basis  She states that she only takes Tylenol intermittently for pain relief  She has been icing and elevating as instructed  Overall, she is happy with her progress so far postoperatively  Numbness has gone away          Physical Exam     /77   Pulse 62   Temp 97 8 °F (36 6 °C)   Ht 5' 8" (1 727 m)   Wt 105 kg (232 lb)   BMI 35 28 kg/m²     Left Hand: Skin is intact  No erythema  Incision is C/D/I without signs of infection  Moderate swelling  Full pro supination  Wrist flexion is near full  Wrist extension 45°  5/5 Motor to the APB, FDI, FDP2, FDP5, EDC  Sensation intact to light touch in the median, radial, and ulnar nerve distribution  Brisk capillary refill  Data Review       Imaging:  None performed today    Assessment and Plan       22-year-old female 10 days status post excision hook of hamate fracture with Guyon canal release of right wrist performed on 06/29/2020  Doing very well postoperatively  Taught gentle wrist, hand and finger range of motion today in the office (6 pack exercises provided for the patient in her after visit summary)  Will place patient in a volar resting splint today  She may remove when needed  She will be placed on light duty at work beginning on 07/20/2020  Restrictions will be no lifting greater than 5 lb and must use brace at all times  These restrictions are in place until her next follow-up visit in 4 weeks  Note was provided today      Follow Up: 4 weeks    To Do Next Visit: Re Evaluation of current issue    PROCEDURES PERFORMED:  Procedures  No Procedures performed today    Scribe Attestation    I,:   Ingrid Owens am acting as a scribe while in the presence of the attending physician :        I,:   Vale Howard MD personally performed the services described in this documentation    as scribed in my presence :

## 2020-07-09 NOTE — LETTER
July 9, 2020     Patient: Carson Pod   YOB: 1965   Date of Visit: 7/9/2020       To Whom it May Concern:    Tova Jacobson is under my professional care  She was seen in my office on 7/9/2020  She may return to work on 7/20/2020 with light duty restrictions of no lifting over 5lbs and must wear splint at all times    If you have any questions or concerns, please don't hesitate to call           Sincerely,          Litzy Bragg MD        CC: No Recipients

## 2020-08-05 ENCOUNTER — TELEPHONE (OUTPATIENT)
Dept: OBGYN CLINIC | Facility: MEDICAL CENTER | Age: 55
End: 2020-08-05

## 2020-08-06 ENCOUNTER — OFFICE VISIT (OUTPATIENT)
Dept: OBGYN CLINIC | Facility: MEDICAL CENTER | Age: 55
End: 2020-08-06
Payer: OTHER MISCELLANEOUS

## 2020-08-06 VITALS — HEIGHT: 68 IN | TEMPERATURE: 97.8 F | WEIGHT: 232 LBS | BODY MASS INDEX: 35.16 KG/M2

## 2020-08-06 DIAGNOSIS — S62.154A CLOSED NONDISPLACED FRACTURE OF HOOK PROCESS OF HAMATE OF RIGHT WRIST, INITIAL ENCOUNTER: Primary | ICD-10-CM

## 2020-08-06 DIAGNOSIS — Z98.890 S/P MUSCULOSKELETAL SYSTEM SURGERY: ICD-10-CM

## 2020-08-06 PROCEDURE — 99213 OFFICE O/P EST LOW 20 MIN: CPT | Performed by: ORTHOPAEDIC SURGERY

## 2020-08-06 NOTE — PROGRESS NOTES
Chief Complaint     ***      History of Present Illness     Maurilio Angulo is a 62 y o  female ***          Past Medical History:   Diagnosis Date    Arthritis     hand    Mononucleosis 2019    Seasonal allergies        Past Surgical History:   Procedure Laterality Date    APPENDECTOMY      BONE EXOSTOSIS EXCISION Right 2020    Procedure: excision right hook of hamate fracture, right guyon canal release;  Surgeon: Santiago Isbell MD;  Location: [address] [place] [address];  Service: Orthopedics     SECTION      COLONOSCOPY      HYSTERECTOMY          JOINT REPLACEMENT      BTKR       Allergies   Allergen Reactions    Amoxicillin-Pot Clavulanate Hives, Itching, Wheezing and Facial Swelling    Bee Venom Anaphylaxis    Celecoxib Rash       Current Outpatient Medications on File Prior to Visit   Medication Sig Dispense Refill    acetaminophen (TYLENOL) 325 mg tablet Take 2 tablets (650 mg total) by mouth every 6 (six) hours as needed for mild pain, headaches or fever (Patient not taking: Reported on 2020) 30 tablet 0    diclofenac sodium (VOLTAREN) 1 %        No current facility-administered medications on file prior to visit  Social History     Tobacco Use    Smoking status: Never Smoker    Smokeless tobacco: Never Used   Substance Use Topics    Alcohol use: Yes     Frequency: Monthly or less     Binge frequency: Never     Comment: occassional    Drug use: Never       Family History   Problem Relation Age of Onset    No Known Problems Mother     No Known Problems Sister     No Known Problems Sister     No Known Problems Maternal Aunt     No Known Problems Maternal Aunt     No Known Problems Paternal Aunt     Cancer Paternal Aunt     No Known Problems Paternal Aunt        Review of Systems     As stated in the HPI  All other systems were reviewed and are negative        Physical Exam     Temp 97 8 °F (36 6 °C)   Ht 5' 8" (1 727 m)   Wt 105 kg (232 lb)   BMI 35 28 kg/m² GENERAL: This is a well-developed, well-nourished, age-appropriate patient in no acute distress  The patient is alert and oriented x3  Pleasant and cooperative  Eyes: Anicteric sclerae  Extraocular movements appear intact  HENT: Nares are patent with no drainage  Lungs: There is equal chest rise on inspection  Breathing is non-labored with no audible wheezing  Cardiovascular: No cyanosis  No upper extremity lymphadema  Skin: Skin is warm to touch  No obvious skin lesions or rashes other than described below  Neurologic: No ataxia  Psychiatric: Mood and affect are appropriate  ***    Data Review     Results Reviewed     None             Imaging:  ***    Assessment and Plan      There are no diagnoses linked to this encounter         ***      Follow Up: ***    To Do Next Visit: ***    PROCEDURES PERFORMED:  Procedures  {Was Proc done:27932::"No Procedures performed today"}

## 2020-08-06 NOTE — PROGRESS NOTES
Chief Complaint     Right hand pain     History of Present Illness     Whitley Parra is a 62 y o  female who presents the office today status post excision of hook of hamate fracture and Guyon canal release on 2020  Patient states she is overall doing well today  She continues to work on range of motion at home with her 6 back exercises  She notes improvement with her range of motion of the fingers but does note continued limitations with wrist flexion  She does continue to note soreness about the incision area and there has been a small area of white scar tissue forming in the area of incisions  She has been performing scar massage  She reports she has returned to work light duty with a lifting restriction of no lifting greater than 5 lb  She works in a furniture assembly and notes she does not feel she can tolerate drilling or using a screw  at this time  She notes she has strength deficits is not work on strengthening her hand  She notes that she is holding heavy objects she needs to use the left hand for support so she does not drop them  She denies any new injury  She denies any distal paresthesias  She notes numbness she had prior to surgery has resolved  Past Medical History:   Diagnosis Date    Arthritis     hand    Mononucleosis 2019    Seasonal allergies        Past Surgical History:   Procedure Laterality Date    APPENDECTOMY      BONE EXOSTOSIS EXCISION Right 2020    Procedure: excision right hook of hamate fracture, right guyon canal release;  Surgeon:  Simba Terry MD;  Location: [facility] MAIN OR;  Service: Orthopedics     SECTION      COLONOSCOPY      HYSTERECTOMY      1994    JOINT REPLACEMENT      BTKR       Allergies   Allergen Reactions    Amoxicillin-Pot Clavulanate Hives, Itching, Wheezing and Facial Swelling    Bee Venom Anaphylaxis    Celecoxib Rash       Current Outpatient Medications on File Prior to Visit   Medication Sig Dispense Refill    acetaminophen (TYLENOL) 325 mg tablet Take 2 tablets (650 mg total) by mouth every 6 (six) hours as needed for mild pain, headaches or fever (Patient not taking: Reported on 8/6/2020) 30 tablet 0    diclofenac sodium (VOLTAREN) 1 %        No current facility-administered medications on file prior to visit  Social History     Tobacco Use    Smoking status: Never Smoker    Smokeless tobacco: Never Used   Substance Use Topics    Alcohol use: Yes     Frequency: Monthly or less     Binge frequency: Never     Comment: occassional    Drug use: Never       Family History   Problem Relation Age of Onset    No Known Problems Mother     No Known Problems Sister     No Known Problems Sister     No Known Problems Maternal Aunt     No Known Problems Maternal Aunt     No Known Problems Paternal Aunt     Cancer Paternal Aunt     No Known Problems Paternal Aunt        Review of Systems     As stated in the HPI  All other systems were reviewed and are negative  Physical Exam     Temp 97 8 °F (36 6 °C)   Ht 5' 8" (1 727 m)   Wt 105 kg (232 lb)   BMI 35 28 kg/m²     GENERAL: This is a well-developed, well-nourished, age-appropriate patient in no acute distress  The patient is alert and oriented x3  Pleasant and cooperative  Eyes: Anicteric sclerae  Extraocular movements appear intact  HENT: Nares are patent with no drainage  Lungs: There is equal chest rise on inspection  Breathing is non-labored with no audible wheezing  Cardiovascular: No cyanosis  No upper extremity lymphadema  Skin: Skin is warm to touch  No obvious skin lesions or rashes other than described below  Neurologic: No ataxia  Psychiatric: Mood and affect are appropriate      Right wrist  Skin intact, incision well healed, small area of white scar tissue noted  Swelling and tenderness at incision, no erythema or signs of infection noted  Full pronation and supination   Full wrist extension noted  Mild tenderness to palpation about the incision  Lacking 30° wrist flexion in comparison to the contralateral side  DPC 0  5/5 Motor to the APB, FDI, FDP2, FDP5, EDC, and interosseous  Sensation intact to light touch in the median, radial, and ulnar nerve distribution  Brisk capillary refill noted to all digits     Data Review     Results Reviewed     None             Imaging:  None today     Assessment and Plan      Diagnoses and all orders for this visit:    Closed nondisplaced fracture of hook process of hamate of right wrist, initial encounter  -     Ambulatory referral to PT/OT hand therapy; Future    S/P musculoskeletal system surgery  -     Ambulatory referral to PT/OT hand therapy; Future    Other orders  -     diclofenac sodium (VOLTAREN) 1 %           49-year-old female status post above surgeries  Patient and I discussed that she is making progress postoperatively  I would like her to begin strengthening with a hand therapist   Referral was placed in her chart today  I discussed with her that with her job it is not uncommon for her to need an additional month light duty restrictions  She  should continue with scar massage and ice about the incision areas  She may continue to use her brace for contact work activity only  Patient was provided with a new work note saying that she is not to lift greater than 5 lb with the right upper extremity  She should continue to maintain her splint at work  I will see her back in 4 weeks time for re-evaluation and range of motion check    At that point she will be nearing maximal medical improvement      Follow Up: 4 weeks     To Do Next Visit: re-evaluation, range of motion check    PROCEDURES PERFORMED:  Procedures  No Procedures performed today     Scribe Attestation    I,:   Alberta Glover MA am acting as a scribe while in the presence of the attending physician :        I,:   Souleymane Magallanes MD personally performed the services described in this documentation    as scribed in my presence :

## 2020-08-06 NOTE — LETTER
August 6, 2020     Patient: Nahomy Piña   YOB: 1965   Date of Visit: 8/6/2020       To Whom it May Concern:    Jimmie Freitas is under my professional care  She was seen in my office on 8/6/2020  She may return to work on 8/6/2020 with light duty restrictions of no lifting over 5lbs and must wear splint at all times on right hand  If you have any questions or concerns, please don't hesitate to call           Sincerely,          Esme Gracia MD        CC: No Recipients

## 2020-09-03 ENCOUNTER — OFFICE VISIT (OUTPATIENT)
Dept: OBGYN CLINIC | Facility: MEDICAL CENTER | Age: 55
End: 2020-09-03

## 2020-09-03 VITALS
DIASTOLIC BLOOD PRESSURE: 74 MMHG | HEIGHT: 68 IN | TEMPERATURE: 97.5 F | HEART RATE: 74 BPM | WEIGHT: 240 LBS | SYSTOLIC BLOOD PRESSURE: 121 MMHG | BODY MASS INDEX: 36.37 KG/M2

## 2020-09-03 DIAGNOSIS — S62.154A CLOSED NONDISPLACED FRACTURE OF HOOK PROCESS OF HAMATE OF RIGHT WRIST, INITIAL ENCOUNTER: ICD-10-CM

## 2020-09-03 DIAGNOSIS — Z98.890 S/P MUSCULOSKELETAL SYSTEM SURGERY: Primary | ICD-10-CM

## 2020-09-03 PROCEDURE — 99024 POSTOP FOLLOW-UP VISIT: CPT | Performed by: ORTHOPAEDIC SURGERY

## 2020-09-03 NOTE — LETTER
September 3, 2020     Patient: Lawerence Nageotte   YOB: 1965   Date of Visit: 9/3/2020       To Whom it May Concern:    Pearl Ramos is under my professional care  She was seen in my office on 9/3/2020  She may return to work with limitations no single lifting over 15 lbs, and no assembly duty       If you have any questions or concerns, please don't hesitate to call           Sincerely,          Hilaria Ram MD        CC: No Recipients

## 2020-09-03 NOTE — LETTER
September 3, 2020     Patient: Chad Perez   YOB: 1965   Date of Visit: 9/3/2020       To Whom it May Concern:    Edith Briggs is under my professional care  She was seen in my office on 9/3/2020  She may return to work with limitations no single lifting over 15 lbs  If you have any questions or concerns, please don't hesitate to call           Sincerely,          Sher Schroeder MD        CC: No Recipients

## 2020-09-03 NOTE — PROGRESS NOTES
History of the Present Illness     Derrick Becerra is a 62 y o  female presents the office today for follow-up evaluation status post excision of hook of hamate fracture and Guyon canal release on 06/29/2020  This is a continuation of worker's compensation case  Patient states she feels like she has made improvement with the swelling and sensitivity about the palm  She does continue to have some mild soreness about the surgical area  She states that therapy has been making progress with therapy and at therapy hand improved her range of motion and strength  He feels there is still room for improvement at this time would like to continue with therapy  She notes that she has returned to work with limitations of no lifting greater than 5 lb  Her current job does not bother her hand significantly  She does have 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 as she is not able to put a lot of pressure on to the surgical area  She denies any new injury  She denies any distal paresthesias  Physical Exam     /74   Pulse 74   Temp 97 5 °F (36 4 °C)   Ht 5' 8" (1 727 m)   Wt 109 kg (240 lb)   BMI 36 49 kg/m²     Right hand  Skin intact  No erythema ecchymosis noted  Resolving swelling about the ulnar aspect of the palm  Mildly tender to palpation over the incision site  DPC 0  5/5 Motor to the APB, FDI, FDP2, FDP5, EDC  Sensation intact to light touch in the median, radial, and ulnar nerve distribution  Brisk capillary refill noted    Data Review       Imaging:  None today    Assessment and Plan     49-year-old female status post above surgery  Patient continues to make progress postoperatively  Discussed with her that will return to work with restrictions of no assembly duty and no single lifting greater than 15 lb  She is nearing maximal medical improvement which I expect to take about another month  She may continue with hand therapy per protocol    I will follow-up with her in 4 weeks time for re-evaluation and new work restrictions      Follow Up:  4 weeks    To Do Next Visit:  Re-evaluation, new work restrictions    PROCEDURES PERFORMED:  Procedures  No Procedures performed today     Scribe Attestation    I,:   Demetrius Mccormick MA am acting as a scribe while in the presence of the attending physician :        I,:   Gisselle Kennedy MD personally performed the services described in this documentation    as scribed in my presence :

## 2020-09-08 ENCOUNTER — TELEPHONE (OUTPATIENT)
Dept: OBGYN CLINIC | Facility: HOSPITAL | Age: 55
End: 2020-09-08

## 2020-09-08 DIAGNOSIS — S62.154A CLOSED NONDISPLACED FRACTURE OF HOOK PROCESS OF HAMATE OF RIGHT WRIST, INITIAL ENCOUNTER: Primary | ICD-10-CM

## 2020-09-23 NOTE — TELEPHONE ENCOUNTER
Irene Cisneros from [facility] is calling for signed plan of care to be faxed to [phone]        Faxed per request

## 2020-10-05 ENCOUNTER — TELEPHONE (OUTPATIENT)
Dept: OBGYN CLINIC | Facility: HOSPITAL | Age: 55
End: 2020-10-05

## 2020-10-07 ENCOUNTER — OFFICE VISIT (OUTPATIENT)
Dept: OBGYN CLINIC | Facility: MEDICAL CENTER | Age: 55
End: 2020-10-07
Payer: OTHER MISCELLANEOUS

## 2020-10-07 VITALS
HEIGHT: 68 IN | TEMPERATURE: 98.5 F | WEIGHT: 250 LBS | DIASTOLIC BLOOD PRESSURE: 71 MMHG | SYSTOLIC BLOOD PRESSURE: 110 MMHG | HEART RATE: 72 BPM | BODY MASS INDEX: 37.89 KG/M2

## 2020-10-07 DIAGNOSIS — Z98.890 S/P MUSCULOSKELETAL SYSTEM SURGERY: ICD-10-CM

## 2020-10-07 DIAGNOSIS — S62.154A CLOSED NONDISPLACED FRACTURE OF HOOK PROCESS OF HAMATE OF RIGHT WRIST, INITIAL ENCOUNTER: Primary | ICD-10-CM

## 2020-10-07 PROCEDURE — 99214 OFFICE O/P EST MOD 30 MIN: CPT | Performed by: ORTHOPAEDIC SURGERY

## 2020-10-27 ENCOUNTER — TELEPHONE (OUTPATIENT)
Dept: OBGYN CLINIC | Facility: HOSPITAL | Age: 55
End: 2020-10-27

## 2020-11-02 DIAGNOSIS — S62.154A CLOSED NONDISPLACED FRACTURE OF HOOK PROCESS OF HAMATE OF RIGHT WRIST, INITIAL ENCOUNTER: Primary | ICD-10-CM

## 2020-11-05 ENCOUNTER — OFFICE VISIT (OUTPATIENT)
Dept: OBGYN CLINIC | Facility: MEDICAL CENTER | Age: 55
End: 2020-11-05
Payer: OTHER MISCELLANEOUS

## 2020-11-05 VITALS
TEMPERATURE: 97.9 F | DIASTOLIC BLOOD PRESSURE: 75 MMHG | SYSTOLIC BLOOD PRESSURE: 117 MMHG | HEART RATE: 75 BPM | BODY MASS INDEX: 38.01 KG/M2 | HEIGHT: 68 IN

## 2020-11-05 DIAGNOSIS — M65.341 TRIGGER RING FINGER OF RIGHT HAND: ICD-10-CM

## 2020-11-05 DIAGNOSIS — S62.154D CLOSED NONDISPLACED FRACTURE OF HOOK PROCESS OF HAMATE OF RIGHT WRIST WITH ROUTINE HEALING, SUBSEQUENT ENCOUNTER: Primary | ICD-10-CM

## 2020-11-05 DIAGNOSIS — M65.351 TRIGGER LITTLE FINGER OF RIGHT HAND: ICD-10-CM

## 2020-11-05 PROCEDURE — 99213 OFFICE O/P EST LOW 20 MIN: CPT | Performed by: ORTHOPAEDIC SURGERY

## 2020-11-05 NOTE — PROGRESS NOTES
Chief Complaint     Right hand pain      History of Present Illness     Foster Booth is a 60 y o  female presenting for follow up s/p right hook of hamate excision and recent diagnosis of trigger finger (small and ring)  She has been compliant with her 20 lb lifting restriction what work and has even progressed to lifting 50 lb under the supervision of physical therapy  Yesterday while at work she did attempt to use the drill and router which is what she typically uses throughout her day  She experienced pain on the ulnar side of the hand as well as the volar aspect of distal wrist while drilling and also had pain as well while using the router which she attributes to the constant vibration  She also has not been having any pain in the small and ring fingers  Past Medical History:   Diagnosis Date    Arthritis     hand    Mononucleosis 2019    Seasonal allergies        Past Surgical History:   Procedure Laterality Date    APPENDECTOMY      BONE EXOSTOSIS EXCISION Right 2020    Procedure: excision right hook of hamate fracture, right guyon canal release;  Surgeon: Yusuf Monreal MD;  Location: [address] [place] [address];  Service: Orthopedics     SECTION      COLONOSCOPY      HYSTERECTOMY      1994    JOINT REPLACEMENT      [facility]       Allergies   Allergen Reactions    Amoxicillin-Pot Clavulanate Hives, Itching, Wheezing and Facial Swelling    Bee Venom Anaphylaxis    Celecoxib Rash       Current Outpatient Medications on File Prior to Visit   Medication Sig Dispense Refill    acetaminophen (TYLENOL) 325 mg tablet Take 2 tablets (650 mg total) by mouth every 6 (six) hours as needed for mild pain, headaches or fever (Patient not taking: Reported on 2020) 30 tablet 0    diclofenac sodium (VOLTAREN) 1 %        No current facility-administered medications on file prior to visit          Social History     Tobacco Use    Smoking status: Never Smoker    Smokeless tobacco: Never Used Substance Use Topics    Alcohol use: Yes     Frequency: Monthly or less     Binge frequency: Never     Comment: occassional    Drug use: Never       Family History   Problem Relation Age of Onset    No Known Problems Mother     No Known Problems Sister     No Known Problems Sister     No Known Problems Maternal Aunt     No Known Problems Maternal Aunt     No Known Problems Paternal Aunt     Cancer Paternal Aunt     No Known Problems Paternal Aunt        Review of Systems     As stated in the HPI  All other systems were reviewed and are negative  Physical Exam     /75   Pulse 75   Temp 97 9 °F (36 6 °C)   Ht 5' 8" (1 727 m)   BMI 38 01 kg/m²     GENERAL: This is a well-developed, well-nourished, age-appropriate patient in no acute distress  The patient is alert and oriented x3  Pleasant and cooperative  Eyes: Anicteric sclerae  Extraocular movements appear intact  HENT: Nares are patent with no drainage  Lungs: There is equal chest rise on inspection  Breathing is non-labored with no audible wheezing  Cardiovascular: No cyanosis  No upper extremity lymphadema  Skin: Skin is warm to touch  No obvious skin lesions or rashes other than described below  Neurologic: No ataxia  Psychiatric: Mood and affect are appropriate  Right hand: Well healed incision  No swelling or ecchymosis  No erythema   Minimal tenderness at TFCC  Moderate tenderness at pizotriquetral  Maximum tenderness at hematotriquetral joint  No TTP A1 pulley ring and small fingers  Full wrist motion, DPC 0  5/5 Motor to the APB, FDI, FDP2, FDP5, EDC  Sensation intact to light touch in the median, radial, and ulnar nerve distribution    Brisk capillary refill noted in all digits  Palpable distal radial pulse    Data Review     Results Reviewed     None             Imaging:  None     Assessment and Plan      Diagnoses and all orders for this visit:    Closed nondisplaced fracture of hook process of hamate of right wrist with routine healing, subsequent encounter    Trigger little finger of right hand    Trigger ring finger of right hand         38-year-old female status post right of hamate excision about 4 months ago  At her last visit she was also newly diagnosed with small and ring trigger fingers on that same hand  She did not opt for a cortisone injection at that time and does not wish to get one at today's visit either  She has not been having any issues regarding this  The soreness she has been feeling after using the tools at work I reassured her is normal at this time  She was given a work note with restrictions of no routering or drilling for  additional month  She no longer has any lifting restrictions    She was instructed to call the office at that 1 month raphael      Follow Up: will call in one month     To Do Next Visit: reassess, update restrictions    PROCEDURES PERFORMED:    No Procedures performed today     Scribe Attestation    I,:   Hanny Jackson MA am acting as a scribe while in the presence of the attending physician :        I,:   Divina Serrano MD personally performed the services described in this documentation    as scribed in my presence :

## 2020-11-05 NOTE — LETTER
November 5, 2020     Patient: Adriano Rosnebaum   YOB: 1965   Date of Visit: 11/5/2020       To Whom it May Concern:    Kyler Peres is under my professional care  She was seen in my office on 11/5/2020  She may return to work with the following restrictions: No drilling or routering for one month  No lifting restriction  She will be reassessed in one month  If you have any questions or concerns, please don't hesitate to call           Sincerely,          Dionicio Kelley MD        CC: No Recipients

## 2020-12-18 ENCOUNTER — TELEPHONE (OUTPATIENT)
Dept: OBGYN CLINIC | Facility: HOSPITAL | Age: 55
End: 2020-12-18

## 2021-02-08 ENCOUNTER — TRANSCRIBE ORDERS (OUTPATIENT)
Dept: ADMINISTRATIVE | Facility: HOSPITAL | Age: 56
End: 2021-02-08

## 2021-02-08 DIAGNOSIS — Z12.31 ENCOUNTER FOR SCREENING MAMMOGRAM FOR MALIGNANT NEOPLASM OF BREAST: Primary | ICD-10-CM

## 2021-02-09 ENCOUNTER — HOSPITAL ENCOUNTER (OUTPATIENT)
Dept: MAMMOGRAPHY | Facility: HOSPITAL | Age: 56
Discharge: HOME | End: 2021-02-09
Attending: FAMILY MEDICINE
Payer: COMMERCIAL

## 2021-02-09 VITALS — HEIGHT: 68 IN | WEIGHT: 250 LBS | BODY MASS INDEX: 37.89 KG/M2

## 2021-02-09 DIAGNOSIS — Z12.31 ENCOUNTER FOR SCREENING MAMMOGRAM FOR MALIGNANT NEOPLASM OF BREAST: ICD-10-CM

## 2021-02-09 PROCEDURE — 77067 SCR MAMMO BI INCL CAD: CPT

## 2021-02-09 PROCEDURE — 77063 BREAST TOMOSYNTHESIS BI: CPT

## 2021-03-02 ENCOUNTER — OFFICE VISIT (OUTPATIENT)
Dept: OBGYN CLINIC | Facility: MEDICAL CENTER | Age: 56
End: 2021-03-02
Payer: OTHER MISCELLANEOUS

## 2021-03-02 VITALS
HEART RATE: 76 BPM | DIASTOLIC BLOOD PRESSURE: 70 MMHG | SYSTOLIC BLOOD PRESSURE: 107 MMHG | BODY MASS INDEX: 38.01 KG/M2 | TEMPERATURE: 98.3 F | HEIGHT: 68 IN

## 2021-03-02 DIAGNOSIS — M79.641 PAIN IN RIGHT HAND: ICD-10-CM

## 2021-03-02 DIAGNOSIS — S62.154A CLOSED NONDISPLACED FRACTURE OF HOOK PROCESS OF HAMATE OF RIGHT WRIST, INITIAL ENCOUNTER: Primary | ICD-10-CM

## 2021-03-02 PROCEDURE — 99213 OFFICE O/P EST LOW 20 MIN: CPT | Performed by: ORTHOPAEDIC SURGERY

## 2021-03-02 NOTE — LETTER
March 2, 2021     Patient: Niru Michaels   YOB: 1965   Date of Visit: 3/2/2021       To Whom it May Concern:    Debbie Sumayatom is under my professional care  She was seen in my office on 3/2/2021  She may return to work with the following restrictions: No drilling or routering until her follow up appointment  No lifting restriction  She will be reassessed at her follow up appointment after testing  If you have any questions or concerns, please don't hesitate to call           Sincerely,          Lucinda Marie MD        CC: No Recipients

## 2021-03-02 NOTE — PROGRESS NOTES
Chief Complaint     Right hand/wrist pain      History of Present Illness     Lolita Stephens is a 60 y o  female who presents today for follow up of her right wrist/hand pain  She is status post excision right hook of hamate and guyon canal decompression 2020  Patient states that she continues to have discomfort on the ulnar side of her palm, just distal to her incision  She states her job has accommodated her and she can lift items and perform these accommodations without issue  However, she continues to have pain with drilling and any tools that vibrate  She denies any numbness/tingling  She denies clicking/catching to her fingers  Past Medical History:   Diagnosis Date    Arthritis     hand    Mononucleosis 2019    Seasonal allergies        Past Surgical History:   Procedure Laterality Date    APPENDECTOMY      BONE EXOSTOSIS EXCISION Right 2020    Procedure: excision right hook of hamate fracture, right guyon canal release;  Surgeon: Keith Sutherland MD;  Location: [address] [place] [address];  Service: Orthopedics     SECTION      COLONOSCOPY      HYSTERECTOMY          JOINT REPLACEMENT      [facility]KR       Allergies   Allergen Reactions    Amoxicillin-Pot Clavulanate Hives, Itching, Wheezing and Facial Swelling    Bee Venom Anaphylaxis    Celecoxib Rash       Current Outpatient Medications on File Prior to Visit   Medication Sig Dispense Refill    acetaminophen (TYLENOL) 325 mg tablet Take 2 tablets (650 mg total) by mouth every 6 (six) hours as needed for mild pain, headaches or fever (Patient not taking: Reported on 2020) 30 tablet 0    diclofenac sodium (VOLTAREN) 1 %        No current facility-administered medications on file prior to visit          Social History     Tobacco Use    Smoking status: Never Smoker    Smokeless tobacco: Never Used   Substance Use Topics    Alcohol use: Yes     Frequency: Monthly or less     Binge frequency: Never     Comment: occassional  Drug use: Never       Family History   Problem Relation Age of Onset    No Known Problems Mother     No Known Problems Sister     No Known Problems Sister     No Known Problems Maternal Aunt     No Known Problems Maternal Aunt     No Known Problems Paternal Aunt     Cancer Paternal Aunt     No Known Problems Paternal Aunt     Prostate cancer Father     Cancer Maternal Grandfather         patient not sure what kind    Prostate cancer Paternal Grandfather        Review of Systems     As stated in the HPI  All other systems were reviewed and are negative  Physical Exam     /70   Pulse 76   Temp 98 3 °F (36 8 °C)   Ht 5' 8" (1 727 m)   BMI 38 01 kg/m²     GENERAL: This is a well-developed, well-nourished, age-appropriate patient in no acute distress  The patient is alert and oriented x3  Pleasant and cooperative  Eyes: Anicteric sclerae  Extraocular movements appear intact  HENT: Nares are patent with no drainage  Lungs: There is equal chest rise on inspection  Breathing is non-labored with no audible wheezing  Cardiovascular: No cyanosis  No upper extremity lymphadema  Skin: Skin is warm to touch  No obvious skin lesions or rashes other than described below  Neurologic: No ataxia  Psychiatric: Mood and affect are appropriate  Right Upper Extremity:  Incision well-healed  No edema, erythema, ecchymosis  Patient's main area of tenderness to palpation is just distal to her incision, near area of 4th and 5th CMC joints  She has this on both palmar and dorsal surfaces  Patient with lesser degree of tenderness to palpation of the small finger A1 pulley   No clicking/triggering of ring or small fingers at level of A1 pulley  5/5 Motor to the APB, FDI, FDP2, FDP5, EDC  Sensation intact to light touch in the median, radial, and ulnar nerve distribution  significant pain to resisted small finger flexion  Brisk capillary refill       Data Review     Results Reviewed     None Imaging:  No new imagine today  Assessment and Plan      Diagnoses and all orders for this visit:    Closed nondisplaced fracture of hook process of hamate of right wrist, initial encounter  -     DXA bone density spine hip and pelvis; Future    Pain in right hand  -     US MSK limited; Future       60year old female status post excision right hook of hamate and guyon canal decompression 06/29/2020 with continued right hand pain  I discussed with the patient that since she still is having pain this far out, that I would like to investigate this further  I discussed that when the hook of hamate was excised, we do try to smooth the bone but I want to get an ultrasound to make sure no signs of tendon fraying/tearing as this is what she is having most pain with  This will also evaluate for tendonitis as well as evaluate the ulnar nerve status post guyon canal decompression  We will continue with patient's current work restrictions until we are able to review the ultrasound results  Follow Up:  After testing    To Do Next Visit:  Four view x-rays Right hand metacarpal series    PROCEDURES PERFORMED:  Procedures  No Procedures performed today

## 2021-03-03 ENCOUNTER — TELEPHONE (OUTPATIENT)
Dept: OBGYN CLINIC | Facility: MEDICAL CENTER | Age: 56
End: 2021-03-03

## 2021-03-03 NOTE — TELEPHONE ENCOUNTER
Patient sees Dr Blessing Neves at [id number] for script to be faxed to [phone] completed

## 2021-03-04 ENCOUNTER — TELEPHONE (OUTPATIENT)
Dept: OBGYN CLINIC | Facility: MEDICAL CENTER | Age: 56
End: 2021-03-04

## 2021-03-04 NOTE — TELEPHONE ENCOUNTER
Patient sees Dr Antony Pena at [facility] requesting work notes to be faxed to [phone]

## 2021-03-09 ENCOUNTER — OFFICE VISIT (OUTPATIENT)
Dept: BARIATRICS | Facility: CLINIC | Age: 56
End: 2021-03-09
Payer: COMMERCIAL

## 2021-03-09 VITALS
SYSTOLIC BLOOD PRESSURE: 112 MMHG | BODY MASS INDEX: 38.84 KG/M2 | HEIGHT: 66 IN | DIASTOLIC BLOOD PRESSURE: 68 MMHG | RESPIRATION RATE: 14 BRPM | TEMPERATURE: 96.5 F | WEIGHT: 241.7 LBS | HEART RATE: 75 BPM

## 2021-03-09 DIAGNOSIS — E66.01 SEVERE OBESITY (HCC): Primary | ICD-10-CM

## 2021-03-09 DIAGNOSIS — R63.5 ABNORMAL WEIGHT GAIN: ICD-10-CM

## 2021-03-09 PROCEDURE — 99243 OFF/OP CNSLTJ NEW/EST LOW 30: CPT | Performed by: PHYSICIAN ASSISTANT

## 2021-03-09 PROCEDURE — 1036F TOBACCO NON-USER: CPT | Performed by: PHYSICIAN ASSISTANT

## 2021-03-09 NOTE — PROGRESS NOTES
Assessment/Plan:    Severe obesity ([facility] 75 )  -Discussed options of HealthyCORE-Intensive Lifestyle Intervention Program, Very Low Calorie Diet-VLCD and Conservative Program and the role of weight loss medications   -Initial weight loss goal of 5-10% weight loss for improved health  -Screening labs: update labs  -Patient is interested in pursuing VLCD    +STOP BANG (4/8):  -reviewed risks of undiagnosed/untreated sleep apnea  -Recommended referral to sleep medicine provider for further evaluation   -Patient declined and would like to see if risks improve with weight loss  Repeat STOP BANG 3-4 months    Labs ordered: yes  HTN meds addressed: n/a  DM2 meds addressed: n/a  VLCD time restriction based on BMI: no  EKG: ordered    Goals:    Food log (ie ) www myfitnesspal com,sparkpeople  com,loseit com,calorieking  com,etc    No sugary beverages  At least 80 oz of water daily  Follow up with patient once labs and EKG obtained    Diagnoses and all orders for this visit:    Severe obesity ([place] [id number] )  -     Comprehensive metabolic panel; Future  -     Insulin, fasting; Future  -     TSH, 3rd generation with Free T4 reflex; Future  -     Lipid panel; Future  -     ECG 12 lead; Future    Abnormal weight gain  -     Comprehensive metabolic panel; Future  -     Insulin, fasting; Future  -     TSH, 3rd generation with Free T4 reflex; Future  -     Lipid panel; Future          Subjective:   Chief Complaint   Patient presents with    Consult     MWM consult with Skyler francisco 4/8 measurements taken       Patient ID: Jamin Mathias  is a 60 y o  female with excess weight/obesity here to pursue weight managment      Past Medical History:   Diagnosis Date    Arthritis     hand    Irregular heart beat     Mononucleosis 09/18/2019    Seasonal allergies          HPI:  Obesity/Excess Weight:  Severity: Severe  Onset:   Past 20 years - gradual gain each year    Modifiers: weight watchers - lost and regained  Contributing factors: lack of willpower, reports being raised to finish her plate at meals  Associated symptoms: fatigue, increased shortness of breath, decreased mobility and inability to do certain activities    Goals: 140 lbs  Highest: current    Hydration: water ~48oz; OJ 12oz  Exercise: denies; lot of walking at work  Limited by knee pain  Occupation: works for [employer] - reports walking/lifting at work  ETOH: 1-2x per month  Smoking: denies  Sleep: ~5 hours    Colonoscopy: reports up to date; monitored closely by PCP    The following portions of the patient's history were reviewed and updated as appropriate: allergies, current medications, past family history, past medical history, past social history, past surgical history and problem list     Review of Systems   Constitutional: Negative for chills and fever  HENT: Negative for sore throat  Respiratory: Negative for cough and shortness of breath  Cardiovascular: Negative for chest pain and palpitations  Gastrointestinal: Positive for constipation  Negative for abdominal pain, diarrhea, nausea and vomiting  Genitourinary: Negative for dysuria  Musculoskeletal: Positive for arthralgias  Skin: Negative for rash  Neurological: Negative for dizziness and headaches  Psychiatric/Behavioral: The patient is not nervous/anxious  Denies depression       Objective:    /68 (BP Location: Left arm, Patient Position: Sitting, Cuff Size: Adult)   Pulse 75   Temp (!) 96 5 °F (35 8 °C) (Tympanic)   Resp 14   Ht 5' 6" (1 676 m)   Wt 110 kg (241 lb 11 2 oz)   BMI 39 01 kg/m²     Physical Exam  Vitals signs and nursing note reviewed  Constitutional   General appearance: Abnormal   well developed and obese  Eyes No conjunctival pallor  Ears, Nose, Mouth, and Throat Oral mucosa moist    Pulmonary   Respiratory effort: No increased work of breathing or signs of respiratory distress      Auscultation of lungs: Clear to auscultation, equal breath sounds bilaterally, no wheezes, no rales, no rhonci  Cardiovascular   Auscultation of heart: Normal rate and rhythm, normal S1 and S2, without murmurs  Examination of extremities for edema and/or varicosities: Normal   no edema  Abdomen   Abdomen: Abnormal   The abdomen was obese  Bowel sounds were normal  The abdomen was soft and nontender     Musculoskeletal   Gait and station: Normal     Psychiatric   Orientation to person, place and time: Normal     Affect: appropriate

## 2021-03-09 NOTE — ASSESSMENT & PLAN NOTE
-Discussed options of HealthyCORE-Intensive Lifestyle Intervention Program, Very Low Calorie Diet-VLCD and Conservative Program and the role of weight loss medications   -Initial weight loss goal of 5-10% weight loss for improved health  -Screening labs: update labs  -Patient is interested in pursuing VLCD    +STOP BANG (4/8):  -reviewed risks of undiagnosed/untreated sleep apnea  -Recommended referral to sleep medicine provider for further evaluation   -Patient declined and would like to see if risks improve with weight loss   Repeat STOP BANG 3-4 months    Labs ordered: yes  HTN meds addressed: n/a  DM2 meds addressed: n/a  VLCD time restriction based on BMI: no  EKG: ordered

## 2021-03-10 PROBLEM — M25.569 KNEE PAIN: Status: ACTIVE | Noted: 2021-03-10

## 2021-03-15 ENCOUNTER — LAB (OUTPATIENT)
Dept: LAB | Facility: HOSPITAL | Age: 56
End: 2021-03-15
Payer: COMMERCIAL

## 2021-03-15 ENCOUNTER — APPOINTMENT (OUTPATIENT)
Dept: LAB | Facility: HOSPITAL | Age: 56
End: 2021-03-15
Payer: COMMERCIAL

## 2021-03-15 DIAGNOSIS — E66.01 SEVERE OBESITY (HCC): ICD-10-CM

## 2021-03-15 DIAGNOSIS — R63.5 ABNORMAL WEIGHT GAIN: ICD-10-CM

## 2021-03-15 LAB
ALBUMIN SERPL BCP-MCNC: 4.6 G/DL (ref 3.5–5.7)
ALP SERPL-CCNC: 61 U/L (ref 40–150)
ALT SERPL W P-5'-P-CCNC: 18 U/L (ref 7–52)
ANION GAP SERPL CALCULATED.3IONS-SCNC: 9 MMOL/L (ref 4–13)
AST SERPL W P-5'-P-CCNC: 17 U/L (ref 13–39)
BILIRUB SERPL-MCNC: 0.7 MG/DL (ref 0.2–1)
BUN SERPL-MCNC: 15 MG/DL (ref 7–25)
CALCIUM SERPL-MCNC: 9.3 MG/DL (ref 8.6–10.5)
CHLORIDE SERPL-SCNC: 99 MMOL/L (ref 98–107)
CHOLEST SERPL-MCNC: 186 MG/DL (ref 0–200)
CO2 SERPL-SCNC: 29 MMOL/L (ref 21–31)
CREAT SERPL-MCNC: 0.79 MG/DL (ref 0.6–1.2)
GFR SERPL CREATININE-BSD FRML MDRD: 83 ML/MIN/1.73SQ M
GLUCOSE P FAST SERPL-MCNC: 92 MG/DL (ref 65–99)
HDLC SERPL-MCNC: 47 MG/DL
LDLC SERPL CALC-MCNC: 115 MG/DL (ref 0–100)
NONHDLC SERPL-MCNC: 139 MG/DL
POTASSIUM SERPL-SCNC: 3.8 MMOL/L (ref 3.5–5.5)
PROT SERPL-MCNC: 7.3 G/DL (ref 6.4–8.9)
SODIUM SERPL-SCNC: 137 MMOL/L (ref 134–143)
TRIGL SERPL-MCNC: 121 MG/DL (ref 44–166)
TSH SERPL DL<=0.05 MIU/L-ACNC: 1.79 UIU/ML (ref 0.45–5.33)

## 2021-03-15 PROCEDURE — 36415 COLL VENOUS BLD VENIPUNCTURE: CPT

## 2021-03-15 PROCEDURE — 83525 ASSAY OF INSULIN: CPT

## 2021-03-15 PROCEDURE — 93005 ELECTROCARDIOGRAM TRACING: CPT

## 2021-03-15 PROCEDURE — 80053 COMPREHEN METABOLIC PANEL: CPT

## 2021-03-15 PROCEDURE — 84443 ASSAY THYROID STIM HORMONE: CPT

## 2021-03-15 PROCEDURE — 80061 LIPID PANEL: CPT

## 2021-03-16 ENCOUNTER — TELEPHONE (OUTPATIENT)
Dept: BARIATRICS | Facility: CLINIC | Age: 56
End: 2021-03-16

## 2021-03-16 LAB — INSULIN SERPL-ACNC: 6.2 MU/L (ref 3–25)

## 2021-03-16 NOTE — TELEPHONE ENCOUNTER
Called patient and left a message to give the office a call to schedule VLCD appointment since she had her EKG done per Sai Terry RD

## 2021-03-17 LAB
ATRIAL RATE: 55 BPM
P AXIS: 37 DEGREES
PR INTERVAL: 168 MS
QRS AXIS: 52 DEGREES
QRSD INTERVAL: 98 MS
QT INTERVAL: 434 MS
QTC INTERVAL: 415 MS
T WAVE AXIS: 37 DEGREES
VENTRICULAR RATE: 55 BPM

## 2021-03-17 PROCEDURE — 93010 ELECTROCARDIOGRAM REPORT: CPT | Performed by: INTERNAL MEDICINE

## 2021-03-22 NOTE — PROGRESS NOTES
Initial RD session for VLCD completed  Components of diet discussed including ketosis, hydration, possible side effects  2 weeks of product ordered and received  Will f/u 3/29 via email by Marissa Aguirre RD  She will be doing subsequent f/u in Marissa Aguirre  Shown how to order product and advised when to place order

## 2021-03-23 ENCOUNTER — HOSPITAL ENCOUNTER (OUTPATIENT)
Dept: RADIOLOGY | Facility: HOSPITAL | Age: 56
Discharge: HOME | End: 2021-03-23
Payer: OTHER MISCELLANEOUS

## 2021-03-23 ENCOUNTER — TRANSCRIBE ORDERS (OUTPATIENT)
Dept: ADMINISTRATIVE | Facility: HOSPITAL | Age: 56
End: 2021-03-23

## 2021-03-23 DIAGNOSIS — M79.641 PAIN IN RIGHT HAND: ICD-10-CM

## 2021-03-23 PROCEDURE — 76882 US LMTD JT/FCL EVL NVASC XTR: CPT

## 2021-03-24 ENCOUNTER — OFFICE VISIT (OUTPATIENT)
Dept: BARIATRICS | Facility: CLINIC | Age: 56
End: 2021-03-24

## 2021-03-24 VITALS — BODY MASS INDEX: 38.49 KG/M2 | WEIGHT: 239.5 LBS | HEIGHT: 66 IN

## 2021-03-24 DIAGNOSIS — R63.5 ABNORMAL WEIGHT GAIN: ICD-10-CM

## 2021-03-24 PROCEDURE — 3008F BODY MASS INDEX DOCD: CPT | Performed by: PHYSICIAN ASSISTANT

## 2021-03-24 PROCEDURE — VLCD

## 2021-03-24 PROCEDURE — RECHECK

## 2021-03-25 ENCOUNTER — IMMUNIZATIONS (OUTPATIENT)
Dept: FAMILY MEDICINE CLINIC | Facility: HOSPITAL | Age: 56
End: 2021-03-25

## 2021-03-25 DIAGNOSIS — Z23 ENCOUNTER FOR IMMUNIZATION: Primary | ICD-10-CM

## 2021-03-25 PROCEDURE — 0011A SARS-COV-2 / COVID-19 MRNA VACCINE (MODERNA) 100 MCG: CPT

## 2021-03-25 PROCEDURE — 91301 SARS-COV-2 / COVID-19 MRNA VACCINE (MODERNA) 100 MCG: CPT

## 2021-03-29 NOTE — PROGRESS NOTES
Weight Management Medical Nutrition Assessment  Pt presented for a VLCD follow-up session  Today's weight is  224 4#  Pt has lost 12 1# in the past 2 weeks and overall has lost 12 1# in the past 2 weeks(5 1% TBW,  averaging  6#/per week)  Tolerating meal replacements without difficulty  Consuming at least 80oz of water in addition to the the water used to mix replacements  Patient not experiencing constipation/diarhea  She placed an order on 4/3, but needs a few extra products because she didn't receive products yet (2 extra days worth given)  Reviewed exercise guidelines  Blood pressure taken  Labs ordered  Will return in 2 weeks  Patient seen by Medical Provider in past 6 months:  yes  Requested to schedule appointment with Medical Provider: No      Anthropometric Measurements  Start Weight (#): 239 5# (4/5/2021)  Current Weight (#):  227 4#  TBW % Change from start weight: 5%  Ideal Body Weight (#): 130#  Goal Weight (#):     Weight Loss History  Previous weight loss attempts: Commercial Programs ([employer], [employer] Chisholm, etc )    Food and Nutrition Related History  Wake up: 2:30 am    Bed Time: 8-8:30 pm  [employer] Recall  Breakfast: (8am): meal replacement    Snack:---  Lunch: (12pm): meal replacement   Snack: (5pm): protein bar  Dinner: (7-8pm): meal replacement  Snack:---      Beverages: water  Volume of beverage intake: 100 oz water    Weekends: Same  Cravings: none  Trouble area of day: none    Frequency of Eating out: n/a  Food restrictions: none  Cooking: self   Food Shopping: self    Physical Activity Intake  Activity: n/a - first 2 wks on VLCD - exercise guidelines explained and reviewed keto snacks  Frequency: n/a  Physical limitations/barriers to exercise:  Broke wrist, 2 knee replacements    Estimated Needs  Energy  [employer] Needs:  BMR : 1633 kcal     1-2# loss weekly sedentary:  960-1460 kcal               1-2# loss weekly lightly active: 6487-6784 kcal  Maintenance calories for sedentary activity level: 1960 kcal  Protein: 71-89 grams    (1 2-1 5g/kg IBW)  Fluid: 2068 ml, 69 oz     (35mL/kg IBW)    Nutrition Diagnosis  Yes; Overweight/obesity  related to Excess energy intake as evidenced by  BMI more than normative standard for age and sex (obesity-grade II 35-39  9)       Nutrition Intervention    Nutrition Prescription  760 calories/ 43 net carbs using 3 meal replacements and 1 bar daily     Meal Plan (Anthony/Pro/Carb)  Breakfast: (8am): meal replacement    Snack:---  Lunch: (12pm): meal replacement   Snack: (5pm): protein bar  Dinner: (7-8pm): meal replacement  Snack:---    Nutrition Education:    Healthy Core Manual  Calorie controlled menu  Lean protein food choices  Healthy snack options  Food journaling tips      Nutrition Counseling:  Strategies: meal planning, portion sizes, healthy snack choices, hydration, fiber intake, protein intake, exercise, food journal      Monitoring and Evaluation:  Evaluation criteria:  Energy Intake  Meet protein needs  Maintain adequate hydration  Monitor weekly weight  Meal planning/preparation  Food journal   Decreased portions at mealtimes and snacks  Physical activity     Barriers to learning:none  Readiness to change: Action:  (Changing behavior)  Comprehension: very good  Expected Compliance: very good

## 2021-04-05 ENCOUNTER — OFFICE VISIT (OUTPATIENT)
Dept: BARIATRICS | Facility: CLINIC | Age: 56
End: 2021-04-05

## 2021-04-05 VITALS
DIASTOLIC BLOOD PRESSURE: 66 MMHG | SYSTOLIC BLOOD PRESSURE: 120 MMHG | WEIGHT: 227.4 LBS | BODY MASS INDEX: 36.55 KG/M2 | HEIGHT: 66 IN

## 2021-04-05 DIAGNOSIS — R63.5 ABNORMAL WEIGHT GAIN: Primary | ICD-10-CM

## 2021-04-05 PROCEDURE — RECHECK

## 2021-04-12 NOTE — PROGRESS NOTES
Weight Management Medical Nutrition Assessment  Pt presented for a [facility] follow-up session  Today's weight is 219 6#  Pt has lost 7 8# in the past 2 weeks and overall has lost 19 9# in the past 4 weeks(8 3% TBW,  averaging 5#/per week)  Tolerating meal replacements without difficulty  Consuming at least 80oz of water in addition to the the water used to mix replacements  She has intermittent loose stools and constipation  She did purchase cherry gelatin fiber supplement  She reports walking on most days, about 1 mile  Blood pressure taken, borderline low (pt states she is always low)  She reports no dizziness or lightheadedness  Drinking adequate fluids  Will return in 2 weeks  Unable to schedule week 8 with Maynor due to availability   Scheduled with Dr Rose Herrera on week 10      Patient seen by Medical Provider in past 6 months:  yes  Requested to schedule appointment with Medical Provider: No        Anthropometric Measurements  Start Weight (#): 239 5# (4/5/2021)  Current Weight (#):  219 6#  TBW % Change from start weight: 8 3%  Ideal Body Weight (#): 130#  Goal Weight (#): 140#     Weight Loss History  Previous weight loss attempts: Commercial Programs ([employer], [employer], etc )     Food and Nutrition Related History  Wake up: 2:30 am         Bed Time: 8-8:30 pm  Works 4am-2pm     Food Recall  Breakfast: (8am): meal replacement                Snack:---  Lunch: (12pm): meal replacement   Snack: (5pm): protein bar  Dinner: (7-8pm): meal replacement  Snack:---        Beverages: water  Volume of beverage intake: 100 oz water     Weekends: Same  Cravings: none  Trouble area of day: none     Frequency of Eating out: n/a  Food restrictions: none  Cooking: self   Food Shopping: self     Physical Activity Intake  Activity: walking 1 mile (takes 30 minutes, so only having 1 keto snack)  Frequency: daily  Physical limitations/barriers to exercise:  Broke wrist, 2 knee replacements     Estimated Needs  Energy  [employer] Needs: BMR : 1598 kcal     1-2# loss weekly sedentary:  917-1417 kcal               1-2# loss weekly lightly active: 5958-9052 kcal  Maintenance calories for sedentary activity level: 1917 kcal  Protein: 71-89 grams    (1 2-1 5g/kg IBW)  Fluid: 2068 ml, 69 oz     (35mL/kg IBW)     Nutrition Diagnosis  Yes; Overweight/obesity  related to Excess energy intake as evidenced by  BMI more than normative standard for age and sex (obesity-grade II 35-39  9)     Nutrition Intervention     Nutrition Prescription  760 calories/ 43 net carbs using 3 meal replacements and 1 bar daily      Meal Plan (Anthony/Pro/Carb)  Breakfast: (8am): meal replacement                Snack:---  Lunch: (12pm): meal replacement   Snack: (5pm): protein bar  Dinner: (7-8pm): meal replacement  Snack:---     Nutrition Education:    Healthy Core Manual  Calorie controlled menu  Lean protein food choices  Healthy snack options  Food journaling tips        Nutrition Counseling:  Strategies: meal planning, portion sizes, healthy snack choices, hydration, fiber intake, protein intake, exercise, food journal        Monitoring and Evaluation:  Evaluation criteria:  Energy Intake  Meet protein needs  Maintain adequate hydration  Monitor weekly weight  Meal planning/preparation  Food journal   Decreased portions at mealtimes and snacks  Physical activity      Barriers to learning:none  Readiness to change: Action:  (Changing behavior)  Comprehension: very good  Expected Compliance: very good

## 2021-04-15 ENCOUNTER — APPOINTMENT (OUTPATIENT)
Dept: LAB | Facility: HOSPITAL | Age: 56
End: 2021-04-15
Payer: COMMERCIAL

## 2021-04-15 DIAGNOSIS — R63.5 ABNORMAL WEIGHT GAIN: ICD-10-CM

## 2021-04-15 LAB
ALBUMIN SERPL BCP-MCNC: 4.6 G/DL (ref 3.5–5)
ALP SERPL-CCNC: 69 U/L (ref 46–116)
ALT SERPL W P-5'-P-CCNC: 42 U/L (ref 12–78)
ANION GAP SERPL CALCULATED.3IONS-SCNC: 10 MMOL/L (ref 4–13)
AST SERPL W P-5'-P-CCNC: 33 U/L (ref 5–45)
BILIRUB SERPL-MCNC: 0.56 MG/DL (ref 0.2–1)
BUN SERPL-MCNC: 12 MG/DL (ref 5–25)
CALCIUM SERPL-MCNC: 9.5 MG/DL (ref 8.3–10.1)
CHLORIDE SERPL-SCNC: 97 MMOL/L (ref 100–108)
CO2 SERPL-SCNC: 27 MMOL/L (ref 21–32)
CREAT SERPL-MCNC: 0.8 MG/DL (ref 0.6–1.3)
GFR SERPL CREATININE-BSD FRML MDRD: 82 ML/MIN/1.73SQ M
GLUCOSE SERPL-MCNC: 81 MG/DL (ref 65–140)
MAGNESIUM SERPL-MCNC: 2.4 MG/DL (ref 1.6–2.6)
POTASSIUM SERPL-SCNC: 3.9 MMOL/L (ref 3.5–5.3)
PROT SERPL-MCNC: 8.2 G/DL (ref 6.4–8.2)
SODIUM SERPL-SCNC: 134 MMOL/L (ref 136–145)

## 2021-04-15 PROCEDURE — 83735 ASSAY OF MAGNESIUM: CPT

## 2021-04-15 PROCEDURE — 80053 COMPREHEN METABOLIC PANEL: CPT

## 2021-04-15 PROCEDURE — 36415 COLL VENOUS BLD VENIPUNCTURE: CPT

## 2021-04-19 ENCOUNTER — OFFICE VISIT (OUTPATIENT)
Dept: BARIATRICS | Facility: CLINIC | Age: 56
End: 2021-04-19

## 2021-04-19 VITALS
SYSTOLIC BLOOD PRESSURE: 104 MMHG | DIASTOLIC BLOOD PRESSURE: 60 MMHG | BODY MASS INDEX: 35.29 KG/M2 | WEIGHT: 219.6 LBS | HEIGHT: 66 IN

## 2021-04-19 DIAGNOSIS — R63.5 ABNORMAL WEIGHT GAIN: Primary | ICD-10-CM

## 2021-04-19 PROCEDURE — VLCD

## 2021-04-19 PROCEDURE — RECHECK

## 2021-04-21 ENCOUNTER — APPOINTMENT (OUTPATIENT)
Dept: RADIOLOGY | Facility: MEDICAL CENTER | Age: 56
End: 2021-04-21
Payer: OTHER MISCELLANEOUS

## 2021-04-21 ENCOUNTER — OFFICE VISIT (OUTPATIENT)
Dept: OBGYN CLINIC | Facility: MEDICAL CENTER | Age: 56
End: 2021-04-21
Payer: OTHER MISCELLANEOUS

## 2021-04-21 VITALS
BODY MASS INDEX: 35.2 KG/M2 | HEART RATE: 69 BPM | HEIGHT: 66 IN | WEIGHT: 219 LBS | SYSTOLIC BLOOD PRESSURE: 109 MMHG | DIASTOLIC BLOOD PRESSURE: 68 MMHG

## 2021-04-21 DIAGNOSIS — S62.154A CLOSED NONDISPLACED FRACTURE OF HOOK PROCESS OF HAMATE OF RIGHT WRIST, INITIAL ENCOUNTER: Primary | ICD-10-CM

## 2021-04-21 DIAGNOSIS — S62.154A CLOSED NONDISPLACED FRACTURE OF HOOK PROCESS OF HAMATE OF RIGHT WRIST, INITIAL ENCOUNTER: ICD-10-CM

## 2021-04-21 DIAGNOSIS — M65.341 TRIGGER FINGER, RIGHT RING FINGER: ICD-10-CM

## 2021-04-21 PROCEDURE — 73130 X-RAY EXAM OF HAND: CPT

## 2021-04-21 PROCEDURE — 99214 OFFICE O/P EST MOD 30 MIN: CPT | Performed by: ORTHOPAEDIC SURGERY

## 2021-04-21 PROCEDURE — 20550 NJX 1 TENDON SHEATH/LIGAMENT: CPT | Performed by: ORTHOPAEDIC SURGERY

## 2021-04-21 RX ORDER — BETAMETHASONE SODIUM PHOSPHATE AND BETAMETHASONE ACETATE 3; 3 MG/ML; MG/ML
3 INJECTION, SUSPENSION INTRA-ARTICULAR; INTRALESIONAL; INTRAMUSCULAR; SOFT TISSUE
Status: COMPLETED | OUTPATIENT
Start: 2021-04-21 | End: 2021-04-21

## 2021-04-21 RX ORDER — LIDOCAINE HYDROCHLORIDE 10 MG/ML
0.5 INJECTION, SOLUTION INFILTRATION; PERINEURAL
Status: COMPLETED | OUTPATIENT
Start: 2021-04-21 | End: 2021-04-21

## 2021-04-21 RX ADMIN — LIDOCAINE HYDROCHLORIDE 0.5 ML: 10 INJECTION, SOLUTION INFILTRATION; PERINEURAL at 17:33

## 2021-04-21 RX ADMIN — BETAMETHASONE SODIUM PHOSPHATE AND BETAMETHASONE ACETATE 3 MG: 3; 3 INJECTION, SUSPENSION INTRA-ARTICULAR; INTRALESIONAL; INTRAMUSCULAR; SOFT TISSUE at 17:33

## 2021-04-21 NOTE — LETTER
April 21, 2021     Patient: Severino Mcgrath   YOB: 1965   Date of Visit: 4/21/2021       To Whom it May Concern:    Rabia Goldberg is under my professional care  She was seen in my office on 4/21/2021  She may return to work with the following restrictions: No drilling or routering for one month  No lifting restriction  She will be on these restrictions for one month if not better at that time a second opinion or independent medial evaluation should be sought  If you have any questions or concerns, please don't hesitate to call           Sincerely,          Anastacia Nichols MD        CC: No Recipients

## 2021-04-21 NOTE — PROGRESS NOTES
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  She stated that she does not have any numbness or tingling  She stated that she takes tylenol as needed for pain  This is a continuation of a worker's compensation case  Patient is currently working with restrictions of no drilling or routerning  Past Medical History:   Diagnosis Date    Arthritis     hand    Irregular heart beat     Mononucleosis 2019    Seasonal allergies        Past Surgical History:   Procedure Laterality Date    APPENDECTOMY      BONE EXOSTOSIS EXCISION Right 2020    Procedure: excision right hook of hamate fracture, right guyon canal release;  Surgeon: Garima Scruggs MD;  Location: [address] [place] [address];  Service: Orthopedics     SECTION      COLONOSCOPY      HYSTERECTOMY      1994    JOINT REPLACEMENT      [facility]       Allergies   Allergen Reactions    Amoxicillin-Pot Clavulanate Hives, Itching, Wheezing and Facial Swelling    Bee Venom Anaphylaxis    Celecoxib Rash       Current Outpatient Medications on File Prior to Visit   Medication Sig Dispense Refill    acetaminophen (TYLENOL) 325 mg tablet Take 2 tablets (650 mg total) by mouth every 6 (six) hours as needed for mild pain, headaches or fever 30 tablet 0    diclofenac sodium (VOLTAREN) 1 %        No current facility-administered medications on file prior to visit          Social History     Tobacco Use    Smoking status: Never Smoker    Smokeless tobacco: Never Used   Substance Use Topics    Alcohol use: Yes     Frequency: Monthly or less     Binge frequency: Never     Comment: occassional    Drug use: Never       Family History   Problem Relation Age of Onset    No Known Problems Mother     No Known Problems Sister     Diabetes Sister     Obesity Sister     No Known Problems Maternal Aunt     No Known Problems Maternal Aunt     No Known Problems Paternal Aunt     Cancer Paternal Aunt     No Known Problems Paternal Aunt     Prostate cancer Father     Diabetes Father     Heart disease Father     Cancer Maternal Grandfather         patient not sure what kind    Stroke Paternal Grandmother     Prostate cancer Paternal Grandfather     Diabetes Brother     Diabetes Brother        Review of Systems     As stated in the HPI  All other systems were reviewed and are negative  Physical Exam     /68   Pulse 69   Ht 5' 6" (1 676 m)   Wt 99 3 kg (219 lb)   BMI 35 35 kg/m²     GENERAL: This is a well-developed, well-nourished, age-appropriate patient in no acute distress  The patient is alert and oriented x3  Pleasant and cooperative  Eyes: Anicteric sclerae  Extraocular movements appear intact  HENT: Nares are patent with no drainage  Lungs: There is equal chest rise on inspection  Breathing is non-labored with no audible wheezing  Cardiovascular: No cyanosis  No upper extremity lymphadema  Skin: Skin is warm to touch  No obvious skin lesions or rashes other than described below  Neurologic: No ataxia  Psychiatric: Mood and affect are appropriate      Right Upper Extremity:   Incision well healed, dry, intact   No obvious swelling   No erythema or ecchymosis     Mildly tender over the excision site  A1 pulley tenderness over ring finger, no tenderness at any other A1 pulley   pain in  The ring and small fingers with resisted finger flexion, no pain felt at the level of the previous hook of hamate fracture with resisted finger flexion  Negative Tinel's at carpal tunnel and cubital tunnel   5/5 Motor to the APB, FDI, FDP2, FDP5, EDC  Sensation intact to light touch in the median, radial, and ulnar nerve distribution  Brisk capillary refill   Palpable distal radial pulse     Data Review     Labs:  None today     Electrodiagnostic Testing:  None today     Imaging:  X-Rays of the right hand metacarpal series performed today and were personally reviewed by me in office and demonstrates  Hook of hamate excision site with no evidence of carpometacarpal arthritis or dislocation    Ultrasound of the right wrist and hand performed on 3/23/2021 demonstrates no evidence of abnormality surrounding the ulnar nerve at the level of Guyon's canal or of the ring and small finger flexion tendon abnormalities  Assessment and Plan      Diagnoses and all orders for this visit:    Closed nondisplaced fracture of hook process of hamate of right wrist, initial encounter  -     XR hand 3+ vw right; Future    Other orders  -     Cancel: DXA bone density spine hip and pelvis; Future           60 y o  female s/p excision right hook of hamate and guyon canal decompression on 6/29/2020  I discussed that due to her tenderness over the A1 pulley of the right ring finger we can try a corticosteroid injection in office today as she may have a bit of tendinitis resultant from her injury or surgery or recovery  Risks and benefits of the injection were discussed at length in office today  Patient tolerated the injection well  I discussed that she will remain on the same work restrictions for one month and if she does not feel better in that time a second opinion or independent medical evaluation should be sought  As I am unsure as to the etiology of her continued pain    So we did discuss in detail that her symptomatology is different now than it was before the surgery and that the pain that she was having in the palm of her hand deeply with any movement or gripping has resolved        Follow Up: PRN     To Do Next Visit:     PROCEDURES PERFORMED:  Hand/upper extremity injection: R ring A1  Universal Protocol:  Consent: Verbal consent obtained  Risks and benefits: risks, benefits and alternatives were discussed  Consent given by: patient  Time out: Immediately prior to procedure a "time out" was called to verify the correct patient, procedure, equipment, support staff and site/side marked as required    Timeout called at: 4/21/2021 5:30 PM   Patient understanding: patient states understanding of the procedure being performed  Site marked: the operative site was marked  Patient identity confirmed: verbally with patient    Supporting Documentation  Indications: pain   Procedure Details  Condition:trigger finger Location: ring finger - R ring A1   Preparation: Patient was prepped and draped in the usual sterile fashion  Needle size: 25 G  Ultrasound guidance: no  Approach: volar  Medications administered: 0 5 mL lidocaine 1 %; 3 mg betamethasone acetate-betamethasone sodium phosphate 6 (3-3) mg/mL    Patient tolerance: patient tolerated the procedure well with no immediate complications  Dressing:  Sterile dressing applied                  Scribe Attestation    I,:  Joan Rosales am acting as a scribe while in the presence of the attending physician :       I,:  Peewee Evans MD personally performed the services described in this documentation    as scribed in my presence :

## 2021-04-21 NOTE — LETTER
April 21, 2021     Patient: Anders Cartagena   YOB: 1965   Date of Visit: 4/21/2021       To Whom it May Concern:    Samira Baez is under my professional care  She was seen in my office on 4/21/2021  She may return to work with the following restrictions: No drilling or routering for one month  No lifting restriction  She will be on these restrictions for one month if not better at that time a second opinion or independent medical evaluation should be sought  If you have any questions or concerns, please don't hesitate to call           Sincerely,          Regla Segovia MD        CC: No Recipients

## 2021-04-23 ENCOUNTER — IMMUNIZATIONS (OUTPATIENT)
Dept: FAMILY MEDICINE CLINIC | Facility: HOSPITAL | Age: 56
End: 2021-04-23

## 2021-04-23 DIAGNOSIS — Z23 ENCOUNTER FOR IMMUNIZATION: Primary | ICD-10-CM

## 2021-04-23 PROCEDURE — 0012A SARS-COV-2 / COVID-19 MRNA VACCINE (MODERNA) 100 MCG: CPT

## 2021-04-23 PROCEDURE — 91301 SARS-COV-2 / COVID-19 MRNA VACCINE (MODERNA) 100 MCG: CPT

## 2021-04-26 NOTE — PROGRESS NOTES
Weight Management Medical Nutrition Assessment  Pt presented for a [facility] follow-up session  Today's weight is 211 4#  Pt has lost 8 2# in the past 2 weeks and overall has lost 28 1# in the past 6 weeks (10 4% TBW,  averaging 4 6#/per week)  She continues to tolerate meal replacements, consuming at least 80oz of water in addition to the the water used to mix replacements  No longer having issues with constipation/diarrhea now that she is using fiber supplements  She continues to walk almost daily, about 1 mile (adds 1 keto snack)   Blood pressure taken, borderline low (pt states she is always low)  She reports no dizziness or lightheadedness  Will return in 2 weeks  Unable to schedule week 8 with Maynor due to availability  Scheduled with Dr Zeferino Arriaza on week 10, she plans to do all 12 weeks  Labs ordered  Products ordered   Overall doing well      Patient seen by Medical Provider in past 6 months:  yes  Requested to schedule appointment with Medical Provider: No        Anthropometric Measurements  Start Weight (#): 239 5# (4/5/2021)  Current Weight (#):  211 4#  TBW % Change from start weight: 10 4%  Ideal Body Weight (#): 130#  Goal Weight (#): 140#     Weight Loss History  Previous weight loss attempts: Commercial Programs (Weight [employer], etc )     Food and Nutrition Related History  Wake up: 2:30 am         Bed Time: 8-8:30 pm  Works 4am-2pm     Food Recall  Breakfast: (6am): meal replacement                Snack:---  Lunch: (10pm): meal replacement   Snack: (2-3pm): protein bar  Dinner: (5pm): meal replacement  Snack:---       Beverages: water  Volume of beverage intake: 100 oz water     Weekends: Same  Cravings: none  Trouble area of day: none     Frequency of Eating out: n/a  Food restrictions: none  Cooking: self   Food Shopping: self     Physical Activity Intake  Activity: walking 1 mile (takes 30 minutes, 1 keto snack - string cheese or pepperoni)  Frequency: daily  Physical limitations/barriers to exercise:  Broke wrist, 2 knee replacements     Estimated Needs  Energy  [address] Energy Needs: BMR : 6812 kcal     1-2# loss weekly sedentary:  873-1373 kcal               1-2# loss weekly lightly active: 6939-8151 kcal  Maintenance calories for sedentary activity level: 1873 kcal  Protein: 71-89 grams    (1 2-1 5g/kg IBW)  Fluid: 2068 ml, 69 oz     (35mL/kg IBW)     Nutrition Diagnosis  Yes;    Overweight/obesity  related to Excess energy intake as evidenced by  BMI more than normative standard for age and sex (obesity-grade II 35-39  9)     Nutrition Intervention     Nutrition Prescription  760 calories/ 43 net carbs using 3 meal replacements and 1 bar daily      Meal Plan (Anthony/Pro/Carb)  Breakfast: (8am): meal replacement                Snack:---  Lunch: (12pm): meal replacement   Snack: (5pm): protein bar  Dinner: (7-8pm): meal replacement  Snack:---     Nutrition Education:    Healthy Core Manual  Calorie controlled menu  Lean protein food choices  Healthy snack options  Food journaling tips        Nutrition Counseling:  Strategies: meal planning, portion sizes, healthy snack choices, hydration, fiber intake, protein intake, exercise, food journal        Monitoring and Evaluation:  Evaluation criteria:  Energy Intake  Meet protein needs  Maintain adequate hydration  Monitor weekly weight  Meal planning/preparation  Food journal   Decreased portions at mealtimes and snacks  Physical activity      Barriers to learning:none  Readiness to change: Action:  (Changing behavior)  Comprehension: very good  Expected Compliance: very good

## 2021-05-03 ENCOUNTER — OFFICE VISIT (OUTPATIENT)
Dept: BARIATRICS | Facility: CLINIC | Age: 56
End: 2021-05-03

## 2021-05-03 VITALS
DIASTOLIC BLOOD PRESSURE: 62 MMHG | WEIGHT: 211.4 LBS | SYSTOLIC BLOOD PRESSURE: 100 MMHG | HEIGHT: 66 IN | BODY MASS INDEX: 33.97 KG/M2

## 2021-05-03 DIAGNOSIS — R63.5 ABNORMAL WEIGHT GAIN: Primary | ICD-10-CM

## 2021-05-03 PROCEDURE — RECHECK

## 2021-05-03 PROCEDURE — 3008F BODY MASS INDEX DOCD: CPT | Performed by: PHYSICIAN ASSISTANT

## 2021-05-03 PROCEDURE — VLCD

## 2021-05-17 ENCOUNTER — TELEPHONE (OUTPATIENT)
Dept: BARIATRICS | Facility: CLINIC | Age: 56
End: 2021-05-17

## 2021-05-17 NOTE — TELEPHONE ENCOUNTER
Patient was supposed to have an appointment today with our dietician Shira in [facility] for week 8 VLCD  Patient called the office and stated that she got a call from the hospital that they need to have a meeting to discuss about her dad going on hospice  She is unable to make appt  I spoke with shira-dietkyle about this and she stated that I can do a virtual either tomorrow, Wednesday or Thursday  She is located in [address] but can do the visit over the phone  I called and left message for the patient stating that we can do virtual either one of those days due to the circumstances that she has  I asked if she can call the [place] office back to confirm appt or needs to call the [place] office to make that next virtual appt for week 8

## 2021-05-27 ENCOUNTER — APPOINTMENT (OUTPATIENT)
Dept: LAB | Facility: HOSPITAL | Age: 56
End: 2021-05-27
Payer: COMMERCIAL

## 2021-05-27 DIAGNOSIS — R63.5 ABNORMAL WEIGHT GAIN: ICD-10-CM

## 2021-05-27 LAB
ALBUMIN SERPL BCP-MCNC: 3.8 G/DL (ref 3.5–5)
ALP SERPL-CCNC: 66 U/L (ref 46–116)
ALT SERPL W P-5'-P-CCNC: 17 U/L (ref 12–78)
ANION GAP SERPL CALCULATED.3IONS-SCNC: 9 MMOL/L (ref 4–13)
AST SERPL W P-5'-P-CCNC: 27 U/L (ref 5–45)
BILIRUB SERPL-MCNC: 0.6 MG/DL (ref 0.2–1)
BUN SERPL-MCNC: 15 MG/DL (ref 5–25)
CALCIUM SERPL-MCNC: 9.1 MG/DL (ref 8.3–10.1)
CHLORIDE SERPL-SCNC: 101 MMOL/L (ref 100–108)
CO2 SERPL-SCNC: 26 MMOL/L (ref 21–32)
CREAT SERPL-MCNC: 0.78 MG/DL (ref 0.6–1.3)
GFR SERPL CREATININE-BSD FRML MDRD: 85 ML/MIN/1.73SQ M
GLUCOSE SERPL-MCNC: 83 MG/DL (ref 65–140)
MAGNESIUM SERPL-MCNC: 2.1 MG/DL (ref 1.6–2.6)
POTASSIUM SERPL-SCNC: 3.9 MMOL/L (ref 3.5–5.3)
PROT SERPL-MCNC: 7 G/DL (ref 6.4–8.2)
SODIUM SERPL-SCNC: 136 MMOL/L (ref 136–145)

## 2021-05-27 PROCEDURE — 80053 COMPREHEN METABOLIC PANEL: CPT

## 2021-05-27 PROCEDURE — 36415 COLL VENOUS BLD VENIPUNCTURE: CPT

## 2021-05-27 PROCEDURE — 83735 ASSAY OF MAGNESIUM: CPT

## 2021-06-01 ENCOUNTER — OFFICE VISIT (OUTPATIENT)
Dept: BARIATRICS | Facility: CLINIC | Age: 56
End: 2021-06-01
Payer: COMMERCIAL

## 2021-06-01 VITALS
DIASTOLIC BLOOD PRESSURE: 62 MMHG | HEIGHT: 66 IN | TEMPERATURE: 96.4 F | BODY MASS INDEX: 31.43 KG/M2 | SYSTOLIC BLOOD PRESSURE: 100 MMHG | HEART RATE: 57 BPM | WEIGHT: 195.6 LBS

## 2021-06-01 DIAGNOSIS — E66.01 SEVERE OBESITY (HCC): Primary | ICD-10-CM

## 2021-06-01 PROCEDURE — 99214 OFFICE O/P EST MOD 30 MIN: CPT | Performed by: FAMILY MEDICINE

## 2021-06-01 NOTE — PROGRESS NOTES
Assessment/Plan:    No problem-specific Assessment & Plan notes found for this encounter  Diagnoses and all orders for this visit:    Severe obesity ([address] )       -Patient is pursuing Very Low Calorie Diet-VLCD  -Initial weight loss goal of 5-10% weight loss for improved health  -Screening labs    Initial:  239 5# (4/5/2021)  Current: 195 6lbs  Change: -46 1lbs  Goal: 140#    Goals:  Cont with VLCD       Follow up in approximately 2 weeks with Non-Surgical Physician/Advanced Practitioner  Subjective:   Chief Complaint   Patient presents with    Follow-up     mwm vlcd week 10 follow up       Patient ID: Venice Schwab  is a 60 y o  female with excess weight/obesity here to pursue weight management  Patient is pursuing Very Low Calorie Diet-VLCD  HPI     10 week VLCD    Doing well  Lost total -46lbs    Compliant, tolerating  Labs reviewed and stable  Will like to complete 12 weeks  The following portions of the patient's history were reviewed and updated as appropriate: allergies, current medications, past family history, past medical history, past social history, past surgical history and problem list     Review of Systems    Objective:    /62 (BP Location: Left arm, Patient Position: Sitting, Cuff Size: Large)   Pulse 57   Temp (!) 96 4 °F (35 8 °C)   Ht 5' 6" (1 676 m)   Wt 88 7 kg (195 lb 9 6 oz)   BMI 31 57 kg/m²      Physical Exam     Constitutional   General appearance: Abnormal   well developed and obese  Eyes No conjunctival pallor     Musculoskeletal   Gait and station: Normal     Psychiatric   Orientation to person, place and time: Normal     Affect: appropriate

## 2021-06-10 NOTE — PROGRESS NOTES
Weight Management Medical Nutrition Assessment  Pt presented for a 12 week VLCD follow-up session  Today's weight is 187 8#  Pt has lost 7 8# in the past 2 weeks and overall has lost 51 7# in the past 12 weeks (21% TBW,  averaging 4 3#/per week)  She continues to tolerate meal replacements, consuming at least 80oz of water in addition to the the water used to mix replacements  She continues to walk almost daily, about 1 mile (adds 1 keto snack)   She reports no dizziness or lightheadedness  Patient transitioned to a low calorie keto meal plan using partial meal replacements  Reviewed Carb Manager walter with patient       Patient seen by Medical Provider in past 6 months:  yes  Requested to schedule appointment with Medical Provider: No        Anthropometric Measurements  Start Weight (#): 239 5# (4/5/2021)  Current Weight (#):  187  8##  TBW % Change from start weight: 21%%  Ideal Body Weight (#): 130#  Goal Weight (#): 140#     Weight Loss History  Previous weight loss attempts: Commercial Programs (Weight [employer], etc )     Food and Nutrition Related History  Wake up: 2:30 am         Bed Time: 8-8:30 pm  Works 4am-2pm     Food Recall  Breakfast: (6am): meal replacement                Snack:string cheese  Lunch: (10pm): meal replacement   Snack: (2-3pm): protein bar  Dinner: (5pm): meal replacement  Snack:---        Beverages: water  Volume of beverage intake: 100 oz water     Weekends: Same  Cravings: none  Trouble area of day: none     Frequency of Eating out: n/a  Food restrictions: none  Cooking: self   Food Shopping: self     Physical Activity Intake  Activity: walking 1 mile (takes 30 minutes, 1 keto snack - string cheese or pepperoni)  Frequency: daily  Physical limitations/barriers to exercise:  Broke wrist, 2 knee replacements     Estimated Needs  Energy  B[employer]iel Energy Needs:  BMR : 5593 YSRE     1-2# loss weekly sedentary:  744-1244 kcal               1-2# loss weekly lightly active:999-1499 kcal  Maintenance calories for sedentary activity level:1744 kcal  Protein: 71-89 grams    (1 2-1 5g/kg IBW)  Fluid: 2068 ml, 69 oz     (35mL/kg IBW)     Nutrition Diagnosis  Yes;    Overweight/obesity  related to Excess energy intake as evidenced by  BMI more than normative standard for age and sex (obesity-grade II 35-39  9)     Nutrition Intervention     Nutrition Prescription  2409-2067 calories/ 43 net carbs using 2meal replacements and 1 bar daily      Meal Plan (Anthony/Pro/Carb)  Breakfast: (8am): meal replacement                Snack:---  Lunch: (12pm): meal replacement   Snack: (5pm): protein bar  Dinner: (7-8pm): Sensible Keto meal (300 anthony/ 30gm protein/ 15 net carbs)  Snack:---     Nutrition Education:    Carb Manager  Keto meal ideas  Calorie controlled menu  Lean protein food choices  Healthy snack options  Food journaling tips        Nutrition Counseling:  Strategies: meal planning, portion sizes, healthy snack choices, hydration, fiber intake, protein intake, exercise, food journal        Monitoring and Evaluation:  Evaluation criteria:  Energy Intake  Meet protein needs  Maintain adequate hydration  Monitor weekly weight  Meal planning/preparation  Food journal   Decreased portions at mealtimes and snacks  Physical activity      Barriers to learning:none  Readiness to change: Action:  (Changing behavior)  Comprehension: very good  Expected Compliance: very good

## 2021-06-14 ENCOUNTER — OFFICE VISIT (OUTPATIENT)
Dept: BARIATRICS | Facility: CLINIC | Age: 56
End: 2021-06-14

## 2021-06-14 VITALS
HEIGHT: 66 IN | WEIGHT: 187.8 LBS | BODY MASS INDEX: 30.18 KG/M2 | DIASTOLIC BLOOD PRESSURE: 58 MMHG | SYSTOLIC BLOOD PRESSURE: 110 MMHG

## 2021-06-14 DIAGNOSIS — R63.5 ABNORMAL WEIGHT GAIN: ICD-10-CM

## 2021-06-14 PROCEDURE — VLCD: Performed by: DIETITIAN, REGISTERED

## 2021-06-14 PROCEDURE — RECHECK: Performed by: DIETITIAN, REGISTERED

## 2021-06-14 PROCEDURE — 3008F BODY MASS INDEX DOCD: CPT | Performed by: FAMILY MEDICINE

## 2021-07-12 NOTE — PROGRESS NOTES
Completed a body composition using tanita scale and reviewed results with patient  Based on results, strongly encouraged incorporating strength training and cardio into her day  Today's weight: 181 6#  Overall she lost 57 9# since the start of VLCD   She continues to follow partial program

## 2021-07-19 ENCOUNTER — OFFICE VISIT (OUTPATIENT)
Dept: BARIATRICS | Facility: CLINIC | Age: 56
End: 2021-07-19

## 2021-07-19 VITALS — HEIGHT: 66 IN | BODY MASS INDEX: 29.18 KG/M2 | WEIGHT: 181.6 LBS

## 2021-07-19 DIAGNOSIS — R63.5 ABNORMAL WEIGHT GAIN: Primary | ICD-10-CM

## 2021-07-19 PROCEDURE — WEIGHT

## 2021-07-19 PROCEDURE — 3008F BODY MASS INDEX DOCD: CPT | Performed by: FAMILY MEDICINE

## 2021-07-19 PROCEDURE — RECHECK

## 2021-09-09 NOTE — PROGRESS NOTES
Assessment/Plan:    Overweight  -Patient is pursuing Conservative Program after 12 weeks of Very Low Calorie Diet-VLCD  -Initial weight loss goal of 5-10% weight loss for improved health  -Screening labs: CMP and Mg reviewed from 5/27/21  -Goal setting discussed; feels comfortable with wear she is at, but would like to lose more  Waking with leg cramping (left); calf or ankle  Denies redness/swelling  Denies personal and fhx of clotting disorder  Denies recent travel and surgery  Improving some with water before bed and daily propel  Recommend daily sweet potato or banana and if no improvement check CMP, MG  ER/911 with CP, SOB, leg pain/redness/swelling, warmth, etc  -Nutrition recs provided/questions answered    Class II Obesity --> Overweight  Initial:  241 7   Current: 178   Change: -63 7 lbs  Goal: 140    Follow up in approximately 3 months with Non-Surgical Physician/Advanced Practitioner  Goals:  Food log (ie ) www myfitnesspal com,sparkpeople  com,loseit com,calorieking  com,etc  baritastic  No sugary beverages  At least 64oz of water daily  Increase physical activity by 10 minutes daily   Gradually increase physical activity to a goal of 5 days per week for 30 minutes of MODERATE intensity PLUS 2 days per week of FULL BODY resistance training  5-10 servings of fruits and vegetables per day and 25-35 grams of dietary fiber per day, gradually increasing  9845-9830 calories  Measure all portions: creamers, sugars, cooking oils/butters, condiments, dressings, etc and log calories   If choosing starch pick whole grain/complex carbs and keep to 1/2 cup: oatmeal, brown rice, quinoa, whole wheat pasta, potatoes, sweet potato, chickpea noodles, red lentil noodles  900-1200 calories  Exercise goal: try adding 10 minutes of exercise twice per week     Diagnoses and all orders for this visit:    Overweight    Body mass index 28 0-28 9, adult        Subjective:   Chief Complaint   Patient presents with    follow up Patient ID: Christiano Rojas  is a 60 y o  female with excess weight/obesity here to pursue weight management  Patient is pursuing Conservative Program      HPI  The patient presents for [facility] follow up  Food logging: continues following partial meal replacement    B: shake  S: none  L: salad and protein with ranch  S: none  D: protein + veggie  S: none    Weekends eats more carbs- sons visit on the weeked    Increased appetite/cravings: denies  Exercise: no, gets up early for work (1 30 am) and then house chores when gets home, but active at work  Hydration:  oz of water per day    The following portions of the patient's history were reviewed and updated as appropriate: allergies, current medications, past family history, past medical history, past social history, past surgical history and problem list     Review of Systems   Cardiovascular: Negative for chest pain and palpitations  Psychiatric/Behavioral: Negative  Objective:    BP 92/62 (BP Location: Left arm, Cuff Size: Large)   Pulse 55   Temp (!) 96 7 °F (35 9 °C) (Temporal)   Ht 5' 6" (1 676 m)   Wt 80 7 kg (178 lb)   SpO2 98%   BMI 28 73 kg/m²      Physical Exam  Vitals and nursing note reviewed  Constitutional   General appearance: Abnormal   well developed and overweight  Pulmonary   Respiratory effort: No increased work of breathing or signs of respiratory distress      Musculoskeletal   Gait and station: Normal    Lower extremities: no redness, swelling or pain of bilateral calf, homans negative, no cording   Psychiatric   Orientation to person, place and time: Normal     Affect: appropriat

## 2021-09-13 ENCOUNTER — OFFICE VISIT (OUTPATIENT)
Dept: BARIATRICS | Facility: CLINIC | Age: 56
End: 2021-09-13
Payer: COMMERCIAL

## 2021-09-13 VITALS
WEIGHT: 178 LBS | HEIGHT: 66 IN | HEART RATE: 55 BPM | OXYGEN SATURATION: 98 % | TEMPERATURE: 96.7 F | DIASTOLIC BLOOD PRESSURE: 62 MMHG | SYSTOLIC BLOOD PRESSURE: 92 MMHG | BODY MASS INDEX: 28.61 KG/M2

## 2021-09-13 DIAGNOSIS — E66.3 OVERWEIGHT: Primary | ICD-10-CM

## 2021-09-13 PROCEDURE — 99214 OFFICE O/P EST MOD 30 MIN: CPT | Performed by: PHYSICIAN ASSISTANT

## 2021-09-13 NOTE — ASSESSMENT & PLAN NOTE
-Patient is pursuing Conservative Program after 12 weeks of Very Low Calorie Diet-VLCD  -Initial weight loss goal of 5-10% weight loss for improved health  -Screening labs: CMP and Mg reviewed from 5/27/21  -Goal setting discussed; feels comfortable with wear she is at, but would like to lose more  Waking with leg cramping (left); calf or ankle  Denies redness/swelling  Denies personal and fhx of clotting disorder  Denies recent travel and surgery  Improving some with water before bed and daily propel   Recommend daily sweet potato or banana and if no improvement check CMP, MG  ER/911 with CP, SOB, leg pain/redness/swelling, warmth, etc  -Nutrition recs provided/questions answered    Class II Obesity --> Overweight  Initial:  241 7   Current: 178   Change: -63 7 lbs  Goal: 140

## 2021-09-13 NOTE — PATIENT INSTRUCTIONS
Goals: Food log (ie ) www myfitnesspal com,sparkpeople  com,loseit com,calorieking  com,etc  baritastic  No sugary beverages  At least 64oz of water daily  Increase physical activity by 10 minutes daily   Gradually increase physical activity to a goal of 5 days per week for 30 minutes of MODERATE intensity PLUS 2 days per week of FULL BODY resistance training  5-10 servings of fruits and vegetables per day and 25-35 grams of dietary fiber per day, gradually increasing  9696-2490 calories  Measure all portions: creamers, sugars, cooking oils/butters, condiments, dressings, etc and log calories   If choosing starch pick whole grain/complex carbs and keep to 1/2 cup: oatmeal, brown rice, quinoa, whole wheat pasta, potatoes, sweet potato, chickpea noodles, red lentil noodles  900-1200 calories  Exercise goal: try adding 10 minutes of exercise twice per week

## 2021-09-28 ENCOUNTER — EVALUATION (OUTPATIENT)
Dept: PHYSICAL THERAPY | Facility: CLINIC | Age: 56
End: 2021-09-28
Payer: OTHER MISCELLANEOUS

## 2021-09-28 DIAGNOSIS — M25.531 RIGHT WRIST PAIN: Primary | ICD-10-CM

## 2021-09-28 PROCEDURE — 97162 PT EVAL MOD COMPLEX 30 MIN: CPT | Performed by: PHYSICAL THERAPIST

## 2021-09-28 PROCEDURE — 97112 NEUROMUSCULAR REEDUCATION: CPT | Performed by: PHYSICAL THERAPIST

## 2021-09-28 PROCEDURE — 97535 SELF CARE MNGMENT TRAINING: CPT | Performed by: PHYSICAL THERAPIST

## 2021-09-28 PROCEDURE — 97140 MANUAL THERAPY 1/> REGIONS: CPT | Performed by: PHYSICAL THERAPIST

## 2021-09-28 PROCEDURE — 97035 APP MDLTY 1+ULTRASOUND EA 15: CPT | Performed by: PHYSICAL THERAPIST

## 2021-09-28 NOTE — LETTER
2021    Neno Ballard MD  [facility] [place] [address] [place] [address]    Patient: Leida Díaz   YOB: 1965   Date of Visit: 2021     Encounter Diagnosis     ICD-10-CM    1  Right wrist pain  M25 531        Dear Dr Jazmyn Monterroso:    Thank you for your recent referral of Leida Díaz  Please review the attached evaluation summary from Kamila's recent visit  Please verify that you agree with the plan of care by signing the attached order  If you have any questions or concerns, please do not hesitate to call  I sincerely appreciate the opportunity to share in the care of one of your patients and hope to have another opportunity to work with you in the near future  Sincerely,    GRETEL AlvaradoT, CHT      Referring Provider:      I certify that I have read the below Plan of Care and certify the need for these services furnished under this plan of treatment while under my care  Neno Ballard MD  [facility]  Via Fax: [phone]          PT Evaluation     Today's date: 2021  Patient name: Leida Díaz  : 1965  MRN: [id number]  Referring provider: Mo Mcintosh MD  Dx:   Encounter Diagnosis     ICD-10-CM    1  Right wrist pain  M25 531                   Assessment  Assessment details: Pt is a  61 YO female presenting to PT with pain, decreased AROM, strength and tolerance to activity  Pt would benefit from skilled intervention to address these issues and maximize overall function  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[employer]; 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  Dominant- right; Involved- right    Goals  ST  Decrease pain to 0-2 /10 in 4 weeks            2  Decrease swelling right palm and wrist            3  Increase AROM to [facility] in 6-8 weeks            4     Maintain clean wound and promote healing            5   Provide orthotic for protection, gel for scar  LT  Increase functional motion and strength for independence with ADL and self care by DC            2  Ability to RTW and recreational activity by DC    Plan  Patient would benefit from: skilled physical therapy  Planned modality interventions: cryotherapy, ultrasound and thermotherapy: hydrocollator packs  Planned therapy interventions: activity modification, manual therapy, neuromuscular re-education, strengthening, stretching, therapeutic activities, therapeutic exercise and home exercise program  Frequency: 3x week  Duration in weeks: 4  Treatment plan discussed with: patient        Subjective Evaluation    History of Present Illness  Date of onset: 2020  Date of surgery: 2021  Mechanism of injury: Pt injured at work, standing on pallet- pt fell backwards on her outstretched right palm  Pt on LD several weeks and noted ongoing pain- fracture of hamate  Ist surgery to remove residual   Pt RTW light duty with cont pain  2nd opinion by Dr William Mcgee  Surgery for hamate w/ exploration and flexor tenosynovectomy with CT release      Pain  Current pain ratin  At best pain ratin  At worst pain ratin  Location: right wrist/palm  Quality: dull ache and throbbing    Hand dominance: right    Treatments  Current treatment: physical therapy  Patient Goals  Patient goals for therapy: decreased edema, decreased pain, increased strength, independence with ADLs/IADLs, return to sport/leisure activities and return to work          Objective     General Comments:      Wrist/Hand Comments  AROM right wrist E/F- 60/55; digits- composite flex/ext                       Thumb MP- 0/65; IP- 0/45; opp- -2 5 cm  Sensation- no tingling noted  Inspection- sutures removed today- small open area base of ulnar carpal area        Moderate localized swelling  Circumference at wrist- 17 0 cm; MPs- 19 5 cm; MF P1- 6 2 cm  Pt issued tubigrip and gel padded glove with HEP instruction             Precautions: avoid repetitive use, heavy grasp      Manuals 9/28            STM             tubigrip E            Gel isotoner med                         Neuro Re-Ed             HP/pulsed 15                                                                                          Ther Ex             W AROM --->                                                                                                                                                                                     Modalities              12            CP 15

## 2021-09-28 NOTE — PROGRESS NOTES
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  Dominant- right; Involved- right    Goals  ST  Decrease pain to 0-2 /10 in 4 weeks            2  Decrease swelling right palm and wrist            3  Increase AROM to [place] in 6-8 weeks            4  Maintain clean wound and promote healing            5   Provide orthotic for protection, gel for scar  LT  Increase functional motion and strength for independence with ADL and self care by DC            2  Ability to RTW and recreational activity by DC    Plan  Patient would benefit from: skilled physical therapy  Planned modality interventions: cryotherapy, ultrasound and thermotherapy: hydrocollator packs  Planned therapy interventions: activity modification, manual therapy, neuromuscular re-education, strengthening, stretching, therapeutic activities, therapeutic exercise and home exercise program  Frequency: 3x week  Duration in weeks: 4  Treatment plan discussed with: patient        Subjective Evaluation    History of Present Illness  Date of onset: 2020  Date of surgery: 2021  Mechanism of injury: Pt injured at work, standing on pallet- pt fell backwards on her outstretched right palm  Pt on LD several weeks and noted ongoing pain- fracture of hamate  Ist surgery to remove residual   Pt RTW light duty with cont pain  2nd opinion by Dr Conn Hy  Surgery for hamate w/ exploration and flexor tenosynovectomy with CT release  Pain  Current pain ratin  At best pain ratin  At worst pain ratin  Location: right wrist/palm  Quality: dull ache and throbbing    Hand dominance: right    Treatments  Current treatment: physical therapy  Patient Goals  Patient goals for therapy: decreased edema, decreased pain, increased strength, independence with ADLs/IADLs, return to sport/leisure activities and return to work          Objective     General Comments:      Wrist/Hand Comments  AROM right wrist E/F- 60/55; digits- composite flex/ext                       Thumb MP- 0/65; IP- 0/45; opp- -2 5 cm  Sensation- no tingling noted  Inspection- sutures removed today- small open area base of ulnar carpal area        Moderate localized swelling  Circumference at wrist- 17 0 cm; MPs- 19 5 cm; MF P1- 6 2 cm  Pt issued tubigrip and gel padded glove with HEP instruction             Precautions: avoid repetitive use, heavy grasp      Manuals             STM             tubigrip E            Gel isotoner med                         Neuro Re-Ed             HP/pulsed 15                                                                                          Ther Ex             W AROM --->                                                                                                                                                                                     Modalities              12            CP 15

## 2021-09-30 ENCOUNTER — OFFICE VISIT (OUTPATIENT)
Dept: PHYSICAL THERAPY | Facility: CLINIC | Age: 56
End: 2021-09-30
Payer: OTHER MISCELLANEOUS

## 2021-09-30 DIAGNOSIS — M25.531 RIGHT WRIST PAIN: Primary | ICD-10-CM

## 2021-09-30 PROCEDURE — 97110 THERAPEUTIC EXERCISES: CPT | Performed by: PHYSICAL THERAPIST

## 2021-09-30 PROCEDURE — 97140 MANUAL THERAPY 1/> REGIONS: CPT | Performed by: PHYSICAL THERAPIST

## 2021-09-30 PROCEDURE — 97112 NEUROMUSCULAR REEDUCATION: CPT | Performed by: PHYSICAL THERAPIST

## 2021-09-30 PROCEDURE — 97035 APP MDLTY 1+ULTRASOUND EA 15: CPT | Performed by: PHYSICAL THERAPIST

## 2021-09-30 NOTE — PROGRESS NOTES
Daily Note     Today's date: 2021  Patient name: Chiqui Page  : 1965  MRN: [id number]  Referring provider: Cain Nagy MD  Dx:   Encounter Diagnosis     ICD-10-CM    1  Right wrist pain  M25 531                   Subjective: mild soreness following initial treatment  Pt is using cold to reduce swelling and pain  Objective: See treatment diary below    AROM right wrist E/F- 60/55; digits- composite flex/ext                       Thumb MP- 0/65; IP- 0/45; opp- -2 5 cm  Sensation- no tingling noted  Inspection- healing incision, pt will massage to hydrate        Moderate localized swelling  Circumference at wrist- 17 0 cm; MPs- 19 5 cm; MF P1- 6 2 cm  Pt issued tubigrip and gel padded glove with HEP instruction      Assessment: Tolerated treatment well  Patient would benefit from continued PT      Plan: Continue per plan of care        Precautions: avoid repetitive use, heavy grasp      Manuals            STM 15 15           tubigrip E            Gel isotoner med                         Neuro Re-Ed             HP/pulsed 15 15                                                                                         Ther Ex             W AROM ---> 2/5                                                                                                                                                                                    Modalities             US 12 12           CP 15 15

## 2021-10-04 ENCOUNTER — OFFICE VISIT (OUTPATIENT)
Dept: PHYSICAL THERAPY | Facility: CLINIC | Age: 56
End: 2021-10-04
Payer: OTHER MISCELLANEOUS

## 2021-10-04 DIAGNOSIS — M25.531 RIGHT WRIST PAIN: Primary | ICD-10-CM

## 2021-10-04 PROCEDURE — 97112 NEUROMUSCULAR REEDUCATION: CPT | Performed by: PHYSICAL THERAPIST

## 2021-10-04 PROCEDURE — 97140 MANUAL THERAPY 1/> REGIONS: CPT | Performed by: PHYSICAL THERAPIST

## 2021-10-04 PROCEDURE — 97110 THERAPEUTIC EXERCISES: CPT | Performed by: PHYSICAL THERAPIST

## 2021-10-04 PROCEDURE — 97035 APP MDLTY 1+ULTRASOUND EA 15: CPT | Performed by: PHYSICAL THERAPIST

## 2021-10-05 ENCOUNTER — OFFICE VISIT (OUTPATIENT)
Dept: PHYSICAL THERAPY | Facility: CLINIC | Age: 56
End: 2021-10-05
Payer: OTHER MISCELLANEOUS

## 2021-10-05 DIAGNOSIS — M25.531 RIGHT WRIST PAIN: Primary | ICD-10-CM

## 2021-10-05 PROCEDURE — 97110 THERAPEUTIC EXERCISES: CPT | Performed by: PHYSICAL THERAPIST

## 2021-10-05 PROCEDURE — 97112 NEUROMUSCULAR REEDUCATION: CPT | Performed by: PHYSICAL THERAPIST

## 2021-10-05 PROCEDURE — 97035 APP MDLTY 1+ULTRASOUND EA 15: CPT | Performed by: PHYSICAL THERAPIST

## 2021-10-05 PROCEDURE — 97140 MANUAL THERAPY 1/> REGIONS: CPT | Performed by: PHYSICAL THERAPIST

## 2021-10-07 ENCOUNTER — OFFICE VISIT (OUTPATIENT)
Dept: PHYSICAL THERAPY | Facility: CLINIC | Age: 56
End: 2021-10-07
Payer: OTHER MISCELLANEOUS

## 2021-10-07 DIAGNOSIS — M25.531 RIGHT WRIST PAIN: Primary | ICD-10-CM

## 2021-10-07 PROCEDURE — 97140 MANUAL THERAPY 1/> REGIONS: CPT | Performed by: PHYSICAL THERAPIST

## 2021-10-07 PROCEDURE — 97112 NEUROMUSCULAR REEDUCATION: CPT | Performed by: PHYSICAL THERAPIST

## 2021-10-07 PROCEDURE — 97035 APP MDLTY 1+ULTRASOUND EA 15: CPT | Performed by: PHYSICAL THERAPIST

## 2021-10-07 PROCEDURE — 97110 THERAPEUTIC EXERCISES: CPT | Performed by: PHYSICAL THERAPIST

## 2021-10-11 ENCOUNTER — OFFICE VISIT (OUTPATIENT)
Dept: PHYSICAL THERAPY | Facility: CLINIC | Age: 56
End: 2021-10-11
Payer: OTHER MISCELLANEOUS

## 2021-10-11 DIAGNOSIS — M25.531 RIGHT WRIST PAIN: Primary | ICD-10-CM

## 2021-10-11 PROCEDURE — 97112 NEUROMUSCULAR REEDUCATION: CPT | Performed by: PHYSICAL THERAPIST

## 2021-10-11 PROCEDURE — 97110 THERAPEUTIC EXERCISES: CPT | Performed by: PHYSICAL THERAPIST

## 2021-10-11 PROCEDURE — 97140 MANUAL THERAPY 1/> REGIONS: CPT | Performed by: PHYSICAL THERAPIST

## 2021-10-11 PROCEDURE — 97035 APP MDLTY 1+ULTRASOUND EA 15: CPT | Performed by: PHYSICAL THERAPIST

## 2021-10-12 ENCOUNTER — APPOINTMENT (OUTPATIENT)
Dept: PHYSICAL THERAPY | Facility: CLINIC | Age: 56
End: 2021-10-12
Payer: OTHER MISCELLANEOUS

## 2021-10-13 ENCOUNTER — OFFICE VISIT (OUTPATIENT)
Dept: PHYSICAL THERAPY | Facility: CLINIC | Age: 56
End: 2021-10-13
Payer: OTHER MISCELLANEOUS

## 2021-10-13 DIAGNOSIS — M25.531 RIGHT WRIST PAIN: Primary | ICD-10-CM

## 2021-10-13 PROCEDURE — 97035 APP MDLTY 1+ULTRASOUND EA 15: CPT

## 2021-10-13 PROCEDURE — 97140 MANUAL THERAPY 1/> REGIONS: CPT

## 2021-10-13 PROCEDURE — 97112 NEUROMUSCULAR REEDUCATION: CPT

## 2021-10-13 PROCEDURE — 97110 THERAPEUTIC EXERCISES: CPT

## 2021-10-14 ENCOUNTER — APPOINTMENT (OUTPATIENT)
Dept: PHYSICAL THERAPY | Facility: CLINIC | Age: 56
End: 2021-10-14
Payer: OTHER MISCELLANEOUS

## 2021-10-18 ENCOUNTER — OFFICE VISIT (OUTPATIENT)
Dept: PHYSICAL THERAPY | Facility: CLINIC | Age: 56
End: 2021-10-18
Payer: OTHER MISCELLANEOUS

## 2021-10-18 DIAGNOSIS — M25.531 RIGHT WRIST PAIN: Primary | ICD-10-CM

## 2021-10-18 PROCEDURE — 97112 NEUROMUSCULAR REEDUCATION: CPT | Performed by: PHYSICAL THERAPIST

## 2021-10-18 PROCEDURE — 97035 APP MDLTY 1+ULTRASOUND EA 15: CPT | Performed by: PHYSICAL THERAPIST

## 2021-10-18 PROCEDURE — 97110 THERAPEUTIC EXERCISES: CPT | Performed by: PHYSICAL THERAPIST

## 2021-10-18 PROCEDURE — 97140 MANUAL THERAPY 1/> REGIONS: CPT | Performed by: PHYSICAL THERAPIST

## 2021-10-19 ENCOUNTER — APPOINTMENT (OUTPATIENT)
Dept: PHYSICAL THERAPY | Facility: CLINIC | Age: 56
End: 2021-10-19
Payer: OTHER MISCELLANEOUS

## 2021-10-20 ENCOUNTER — OFFICE VISIT (OUTPATIENT)
Dept: PHYSICAL THERAPY | Facility: CLINIC | Age: 56
End: 2021-10-20
Payer: OTHER MISCELLANEOUS

## 2021-10-20 DIAGNOSIS — M25.531 RIGHT WRIST PAIN: Primary | ICD-10-CM

## 2021-10-20 PROCEDURE — 97112 NEUROMUSCULAR REEDUCATION: CPT | Performed by: PHYSICAL THERAPIST

## 2021-10-20 PROCEDURE — 97110 THERAPEUTIC EXERCISES: CPT | Performed by: PHYSICAL THERAPIST

## 2021-10-20 PROCEDURE — 97140 MANUAL THERAPY 1/> REGIONS: CPT | Performed by: PHYSICAL THERAPIST

## 2021-10-20 PROCEDURE — 97535 SELF CARE MNGMENT TRAINING: CPT | Performed by: PHYSICAL THERAPIST

## 2021-10-20 PROCEDURE — 97035 APP MDLTY 1+ULTRASOUND EA 15: CPT | Performed by: PHYSICAL THERAPIST

## 2021-10-21 ENCOUNTER — APPOINTMENT (OUTPATIENT)
Dept: PHYSICAL THERAPY | Facility: CLINIC | Age: 56
End: 2021-10-21
Payer: OTHER MISCELLANEOUS

## 2021-10-25 ENCOUNTER — OFFICE VISIT (OUTPATIENT)
Dept: PHYSICAL THERAPY | Facility: CLINIC | Age: 56
End: 2021-10-25
Payer: OTHER MISCELLANEOUS

## 2021-10-25 DIAGNOSIS — M25.531 RIGHT WRIST PAIN: Primary | ICD-10-CM

## 2021-10-25 PROCEDURE — 97035 APP MDLTY 1+ULTRASOUND EA 15: CPT | Performed by: PHYSICAL THERAPIST

## 2021-10-25 PROCEDURE — 97110 THERAPEUTIC EXERCISES: CPT | Performed by: PHYSICAL THERAPIST

## 2021-10-25 PROCEDURE — 97140 MANUAL THERAPY 1/> REGIONS: CPT | Performed by: PHYSICAL THERAPIST

## 2021-10-25 PROCEDURE — 97112 NEUROMUSCULAR REEDUCATION: CPT | Performed by: PHYSICAL THERAPIST

## 2021-10-26 ENCOUNTER — APPOINTMENT (OUTPATIENT)
Dept: PHYSICAL THERAPY | Facility: CLINIC | Age: 56
End: 2021-10-26
Payer: OTHER MISCELLANEOUS

## 2021-10-27 ENCOUNTER — OFFICE VISIT (OUTPATIENT)
Dept: PHYSICAL THERAPY | Facility: CLINIC | Age: 56
End: 2021-10-27
Payer: OTHER MISCELLANEOUS

## 2021-10-27 DIAGNOSIS — M25.531 RIGHT WRIST PAIN: Primary | ICD-10-CM

## 2021-10-27 PROCEDURE — 97140 MANUAL THERAPY 1/> REGIONS: CPT | Performed by: PHYSICAL THERAPIST

## 2021-10-27 PROCEDURE — 97112 NEUROMUSCULAR REEDUCATION: CPT | Performed by: PHYSICAL THERAPIST

## 2021-10-27 PROCEDURE — 97110 THERAPEUTIC EXERCISES: CPT | Performed by: PHYSICAL THERAPIST

## 2021-10-27 PROCEDURE — 97035 APP MDLTY 1+ULTRASOUND EA 15: CPT | Performed by: PHYSICAL THERAPIST

## 2021-10-28 ENCOUNTER — APPOINTMENT (OUTPATIENT)
Dept: PHYSICAL THERAPY | Facility: CLINIC | Age: 56
End: 2021-10-28
Payer: OTHER MISCELLANEOUS

## 2021-11-01 ENCOUNTER — APPOINTMENT (OUTPATIENT)
Dept: PHYSICAL THERAPY | Facility: CLINIC | Age: 56
End: 2021-11-01
Payer: OTHER MISCELLANEOUS

## 2021-11-02 ENCOUNTER — APPOINTMENT (OUTPATIENT)
Dept: PHYSICAL THERAPY | Facility: CLINIC | Age: 56
End: 2021-11-02
Payer: OTHER MISCELLANEOUS

## 2021-11-03 ENCOUNTER — OFFICE VISIT (OUTPATIENT)
Dept: PHYSICAL THERAPY | Facility: CLINIC | Age: 56
End: 2021-11-03
Payer: OTHER MISCELLANEOUS

## 2021-11-03 DIAGNOSIS — M25.531 RIGHT WRIST PAIN: Primary | ICD-10-CM

## 2021-11-03 PROCEDURE — 97035 APP MDLTY 1+ULTRASOUND EA 15: CPT

## 2021-11-03 PROCEDURE — 97140 MANUAL THERAPY 1/> REGIONS: CPT

## 2021-11-03 PROCEDURE — 97112 NEUROMUSCULAR REEDUCATION: CPT

## 2021-11-03 PROCEDURE — 97110 THERAPEUTIC EXERCISES: CPT

## 2021-11-04 ENCOUNTER — APPOINTMENT (OUTPATIENT)
Dept: PHYSICAL THERAPY | Facility: CLINIC | Age: 56
End: 2021-11-04
Payer: OTHER MISCELLANEOUS

## 2021-11-05 ENCOUNTER — OFFICE VISIT (OUTPATIENT)
Dept: PHYSICAL THERAPY | Facility: CLINIC | Age: 56
End: 2021-11-05
Payer: OTHER MISCELLANEOUS

## 2021-11-05 DIAGNOSIS — M25.531 RIGHT WRIST PAIN: Primary | ICD-10-CM

## 2021-11-05 PROCEDURE — 97035 APP MDLTY 1+ULTRASOUND EA 15: CPT

## 2021-11-05 PROCEDURE — 97140 MANUAL THERAPY 1/> REGIONS: CPT

## 2021-11-05 PROCEDURE — 97110 THERAPEUTIC EXERCISES: CPT

## 2021-11-05 PROCEDURE — 97112 NEUROMUSCULAR REEDUCATION: CPT

## 2021-11-08 ENCOUNTER — OFFICE VISIT (OUTPATIENT)
Dept: PHYSICAL THERAPY | Facility: CLINIC | Age: 56
End: 2021-11-08
Payer: OTHER MISCELLANEOUS

## 2021-11-08 DIAGNOSIS — M25.531 RIGHT WRIST PAIN: Primary | ICD-10-CM

## 2021-11-08 PROCEDURE — 97035 APP MDLTY 1+ULTRASOUND EA 15: CPT | Performed by: PHYSICAL THERAPIST

## 2021-11-08 PROCEDURE — 97112 NEUROMUSCULAR REEDUCATION: CPT | Performed by: PHYSICAL THERAPIST

## 2021-11-08 PROCEDURE — 97140 MANUAL THERAPY 1/> REGIONS: CPT | Performed by: PHYSICAL THERAPIST

## 2021-11-08 PROCEDURE — 97110 THERAPEUTIC EXERCISES: CPT | Performed by: PHYSICAL THERAPIST

## 2021-11-10 ENCOUNTER — APPOINTMENT (OUTPATIENT)
Dept: PHYSICAL THERAPY | Facility: CLINIC | Age: 56
End: 2021-11-10
Payer: OTHER MISCELLANEOUS

## 2021-11-11 ENCOUNTER — APPOINTMENT (OUTPATIENT)
Dept: PHYSICAL THERAPY | Facility: CLINIC | Age: 56
End: 2021-11-11
Payer: OTHER MISCELLANEOUS

## 2021-11-12 ENCOUNTER — OFFICE VISIT (OUTPATIENT)
Dept: PHYSICAL THERAPY | Facility: CLINIC | Age: 56
End: 2021-11-12
Payer: OTHER MISCELLANEOUS

## 2021-11-12 DIAGNOSIS — M25.531 RIGHT WRIST PAIN: Primary | ICD-10-CM

## 2021-11-12 PROCEDURE — 97110 THERAPEUTIC EXERCISES: CPT

## 2021-11-12 PROCEDURE — 97140 MANUAL THERAPY 1/> REGIONS: CPT

## 2021-11-12 PROCEDURE — 97035 APP MDLTY 1+ULTRASOUND EA 15: CPT

## 2021-11-12 PROCEDURE — 97112 NEUROMUSCULAR REEDUCATION: CPT

## 2021-11-15 ENCOUNTER — OFFICE VISIT (OUTPATIENT)
Dept: PHYSICAL THERAPY | Facility: CLINIC | Age: 56
End: 2021-11-15
Payer: OTHER MISCELLANEOUS

## 2021-11-15 DIAGNOSIS — M25.531 RIGHT WRIST PAIN: Primary | ICD-10-CM

## 2021-11-15 PROCEDURE — 97112 NEUROMUSCULAR REEDUCATION: CPT | Performed by: PHYSICAL THERAPIST

## 2021-11-15 PROCEDURE — 97110 THERAPEUTIC EXERCISES: CPT | Performed by: PHYSICAL THERAPIST

## 2021-11-15 PROCEDURE — 97035 APP MDLTY 1+ULTRASOUND EA 15: CPT | Performed by: PHYSICAL THERAPIST

## 2021-11-15 PROCEDURE — 97140 MANUAL THERAPY 1/> REGIONS: CPT | Performed by: PHYSICAL THERAPIST

## 2021-11-19 ENCOUNTER — OFFICE VISIT (OUTPATIENT)
Dept: PHYSICAL THERAPY | Facility: CLINIC | Age: 56
End: 2021-11-19
Payer: OTHER MISCELLANEOUS

## 2021-11-19 DIAGNOSIS — M25.531 RIGHT WRIST PAIN: Primary | ICD-10-CM

## 2021-11-19 PROCEDURE — 97112 NEUROMUSCULAR REEDUCATION: CPT

## 2021-11-19 PROCEDURE — 97035 APP MDLTY 1+ULTRASOUND EA 15: CPT

## 2021-11-19 PROCEDURE — 97110 THERAPEUTIC EXERCISES: CPT

## 2021-11-19 PROCEDURE — 97140 MANUAL THERAPY 1/> REGIONS: CPT

## 2021-11-22 ENCOUNTER — TELEPHONE (OUTPATIENT)
Dept: BARIATRICS | Facility: CLINIC | Age: 56
End: 2021-11-22

## 2021-11-22 ENCOUNTER — OFFICE VISIT (OUTPATIENT)
Dept: PHYSICAL THERAPY | Facility: CLINIC | Age: 56
End: 2021-11-22
Payer: OTHER MISCELLANEOUS

## 2021-11-22 DIAGNOSIS — M25.531 RIGHT WRIST PAIN: Primary | ICD-10-CM

## 2021-11-22 PROCEDURE — 97035 APP MDLTY 1+ULTRASOUND EA 15: CPT

## 2021-11-22 PROCEDURE — 97112 NEUROMUSCULAR REEDUCATION: CPT

## 2021-11-22 PROCEDURE — 97140 MANUAL THERAPY 1/> REGIONS: CPT

## 2021-11-22 PROCEDURE — 97110 THERAPEUTIC EXERCISES: CPT

## 2021-11-26 ENCOUNTER — OFFICE VISIT (OUTPATIENT)
Dept: PHYSICAL THERAPY | Facility: CLINIC | Age: 56
End: 2021-11-26
Payer: OTHER MISCELLANEOUS

## 2021-11-26 DIAGNOSIS — M25.531 RIGHT WRIST PAIN: Primary | ICD-10-CM

## 2021-11-26 PROCEDURE — 97535 SELF CARE MNGMENT TRAINING: CPT

## 2021-11-26 PROCEDURE — 97110 THERAPEUTIC EXERCISES: CPT

## 2021-11-26 PROCEDURE — 97140 MANUAL THERAPY 1/> REGIONS: CPT

## 2021-11-26 PROCEDURE — 97112 NEUROMUSCULAR REEDUCATION: CPT

## 2021-11-26 PROCEDURE — 97035 APP MDLTY 1+ULTRASOUND EA 15: CPT

## 2021-11-29 ENCOUNTER — OFFICE VISIT (OUTPATIENT)
Dept: PHYSICAL THERAPY | Facility: CLINIC | Age: 56
End: 2021-11-29
Payer: OTHER MISCELLANEOUS

## 2021-11-29 ENCOUNTER — APPOINTMENT (OUTPATIENT)
Dept: PHYSICAL THERAPY | Facility: CLINIC | Age: 56
End: 2021-11-29
Payer: OTHER MISCELLANEOUS

## 2021-11-29 DIAGNOSIS — M25.531 RIGHT WRIST PAIN: Primary | ICD-10-CM

## 2021-11-29 PROCEDURE — 97112 NEUROMUSCULAR REEDUCATION: CPT | Performed by: PHYSICAL THERAPIST

## 2021-11-29 PROCEDURE — 97110 THERAPEUTIC EXERCISES: CPT | Performed by: PHYSICAL THERAPIST

## 2021-11-29 PROCEDURE — 97140 MANUAL THERAPY 1/> REGIONS: CPT | Performed by: PHYSICAL THERAPIST

## 2021-11-30 ENCOUNTER — APPOINTMENT (OUTPATIENT)
Dept: PHYSICAL THERAPY | Facility: CLINIC | Age: 56
End: 2021-11-30
Payer: OTHER MISCELLANEOUS

## 2021-12-01 ENCOUNTER — OFFICE VISIT (OUTPATIENT)
Dept: PHYSICAL THERAPY | Facility: CLINIC | Age: 56
End: 2021-12-01
Payer: OTHER MISCELLANEOUS

## 2021-12-01 DIAGNOSIS — M25.531 RIGHT WRIST PAIN: Primary | ICD-10-CM

## 2021-12-01 PROCEDURE — 97035 APP MDLTY 1+ULTRASOUND EA 15: CPT | Performed by: PHYSICAL THERAPIST

## 2021-12-01 PROCEDURE — 97110 THERAPEUTIC EXERCISES: CPT | Performed by: PHYSICAL THERAPIST

## 2021-12-01 PROCEDURE — 97140 MANUAL THERAPY 1/> REGIONS: CPT | Performed by: PHYSICAL THERAPIST

## 2021-12-01 PROCEDURE — 97112 NEUROMUSCULAR REEDUCATION: CPT | Performed by: PHYSICAL THERAPIST

## 2021-12-06 ENCOUNTER — OFFICE VISIT (OUTPATIENT)
Dept: PHYSICAL THERAPY | Facility: CLINIC | Age: 56
End: 2021-12-06
Payer: OTHER MISCELLANEOUS

## 2021-12-06 DIAGNOSIS — M25.531 RIGHT WRIST PAIN: Primary | ICD-10-CM

## 2021-12-06 PROCEDURE — 97112 NEUROMUSCULAR REEDUCATION: CPT | Performed by: PHYSICAL THERAPIST

## 2021-12-06 PROCEDURE — 97110 THERAPEUTIC EXERCISES: CPT | Performed by: PHYSICAL THERAPIST

## 2021-12-06 PROCEDURE — 97035 APP MDLTY 1+ULTRASOUND EA 15: CPT | Performed by: PHYSICAL THERAPIST

## 2021-12-06 PROCEDURE — 97140 MANUAL THERAPY 1/> REGIONS: CPT | Performed by: PHYSICAL THERAPIST

## 2021-12-08 ENCOUNTER — OFFICE VISIT (OUTPATIENT)
Dept: PHYSICAL THERAPY | Facility: CLINIC | Age: 56
End: 2021-12-08
Payer: OTHER MISCELLANEOUS

## 2021-12-08 DIAGNOSIS — M25.531 RIGHT WRIST PAIN: Primary | ICD-10-CM

## 2021-12-08 PROCEDURE — 97140 MANUAL THERAPY 1/> REGIONS: CPT

## 2021-12-08 PROCEDURE — 97110 THERAPEUTIC EXERCISES: CPT

## 2021-12-08 PROCEDURE — 97112 NEUROMUSCULAR REEDUCATION: CPT

## 2021-12-08 PROCEDURE — 97035 APP MDLTY 1+ULTRASOUND EA 15: CPT

## 2021-12-10 ENCOUNTER — APPOINTMENT (OUTPATIENT)
Dept: PHYSICAL THERAPY | Facility: CLINIC | Age: 56
End: 2021-12-10
Payer: OTHER MISCELLANEOUS

## 2021-12-13 ENCOUNTER — OFFICE VISIT (OUTPATIENT)
Dept: PHYSICAL THERAPY | Facility: CLINIC | Age: 56
End: 2021-12-13
Payer: OTHER MISCELLANEOUS

## 2021-12-13 DIAGNOSIS — M25.531 RIGHT WRIST PAIN: Primary | ICD-10-CM

## 2021-12-13 PROCEDURE — 97035 APP MDLTY 1+ULTRASOUND EA 15: CPT | Performed by: PHYSICAL THERAPIST

## 2021-12-13 PROCEDURE — 97112 NEUROMUSCULAR REEDUCATION: CPT | Performed by: PHYSICAL THERAPIST

## 2021-12-13 PROCEDURE — 97110 THERAPEUTIC EXERCISES: CPT | Performed by: PHYSICAL THERAPIST

## 2021-12-13 PROCEDURE — 97140 MANUAL THERAPY 1/> REGIONS: CPT | Performed by: PHYSICAL THERAPIST

## 2021-12-17 ENCOUNTER — OFFICE VISIT (OUTPATIENT)
Dept: PHYSICAL THERAPY | Facility: CLINIC | Age: 56
End: 2021-12-17
Payer: OTHER MISCELLANEOUS

## 2021-12-17 DIAGNOSIS — M25.531 RIGHT WRIST PAIN: Primary | ICD-10-CM

## 2021-12-17 PROCEDURE — 97110 THERAPEUTIC EXERCISES: CPT

## 2021-12-17 PROCEDURE — 97140 MANUAL THERAPY 1/> REGIONS: CPT

## 2021-12-17 PROCEDURE — 97035 APP MDLTY 1+ULTRASOUND EA 15: CPT

## 2021-12-17 PROCEDURE — 97112 NEUROMUSCULAR REEDUCATION: CPT

## 2021-12-20 ENCOUNTER — OFFICE VISIT (OUTPATIENT)
Dept: PHYSICAL THERAPY | Facility: CLINIC | Age: 56
End: 2021-12-20
Payer: OTHER MISCELLANEOUS

## 2021-12-20 DIAGNOSIS — M25.531 RIGHT WRIST PAIN: Primary | ICD-10-CM

## 2021-12-20 PROCEDURE — 97140 MANUAL THERAPY 1/> REGIONS: CPT

## 2021-12-20 PROCEDURE — 97112 NEUROMUSCULAR REEDUCATION: CPT

## 2021-12-20 PROCEDURE — 97035 APP MDLTY 1+ULTRASOUND EA 15: CPT

## 2021-12-20 PROCEDURE — 97110 THERAPEUTIC EXERCISES: CPT

## 2021-12-22 ENCOUNTER — OFFICE VISIT (OUTPATIENT)
Dept: PHYSICAL THERAPY | Facility: CLINIC | Age: 56
End: 2021-12-22
Payer: OTHER MISCELLANEOUS

## 2021-12-22 DIAGNOSIS — M25.531 RIGHT WRIST PAIN: Primary | ICD-10-CM

## 2021-12-22 PROCEDURE — 97112 NEUROMUSCULAR REEDUCATION: CPT | Performed by: PHYSICAL THERAPIST

## 2021-12-22 PROCEDURE — 97110 THERAPEUTIC EXERCISES: CPT | Performed by: PHYSICAL THERAPIST

## 2021-12-22 PROCEDURE — 97035 APP MDLTY 1+ULTRASOUND EA 15: CPT | Performed by: PHYSICAL THERAPIST

## 2021-12-22 PROCEDURE — 97140 MANUAL THERAPY 1/> REGIONS: CPT | Performed by: PHYSICAL THERAPIST

## 2023-02-21 ENCOUNTER — HOSPITAL ENCOUNTER (EMERGENCY)
Facility: HOSPITAL | Age: 58
Discharge: HOME | End: 2023-02-21
Attending: EMERGENCY MEDICINE

## 2023-02-21 VITALS
RESPIRATION RATE: 18 BRPM | WEIGHT: 177.91 LBS | TEMPERATURE: 97.2 F | HEART RATE: 44 BPM | SYSTOLIC BLOOD PRESSURE: 136 MMHG | DIASTOLIC BLOOD PRESSURE: 57 MMHG | OXYGEN SATURATION: 96 % | BODY MASS INDEX: 28.72 KG/M2

## 2023-02-21 DIAGNOSIS — S61.512A LACERATION OF LEFT WRIST, INITIAL ENCOUNTER: Primary | ICD-10-CM

## 2023-02-21 RX ORDER — GINSENG 100 MG
1 CAPSULE ORAL ONCE
Status: COMPLETED | OUTPATIENT
Start: 2023-02-21 | End: 2023-02-21

## 2023-02-21 RX ORDER — LIDOCAINE HYDROCHLORIDE AND EPINEPHRINE 10; 10 MG/ML; UG/ML
10 INJECTION, SOLUTION INFILTRATION; PERINEURAL ONCE
Status: COMPLETED | OUTPATIENT
Start: 2023-02-21 | End: 2023-02-21

## 2023-02-21 RX ADMIN — BACITRACIN 1 SMALL APPLICATION: 500 OINTMENT TOPICAL at 07:44

## 2023-02-21 RX ADMIN — LIDOCAINE HYDROCHLORIDE,EPINEPHRINE BITARTRATE 10 ML: 10; .01 INJECTION, SOLUTION INFILTRATION; PERINEURAL at 07:44

## 2023-02-21 RX ADMIN — TETANUS TOXOID, REDUCED DIPHTHERIA TOXOID AND ACELLULAR PERTUSSIS VACCINE, ADSORBED 0.5 ML: 5; 2.5; 8; 8; 2.5 SUSPENSION INTRAMUSCULAR at 07:45

## 2023-02-21 NOTE — ED PROVIDER NOTES
History  Chief Complaint   Patient presents with   • Extremity Laceration     Pt c/o left wrist laceration that happened last night while using a chain saw  No thinners, unknown last tetanus shot     HPI  28-year-old female present to the ER for evaluation of laceration to the left wrist   Patient reports injury occurred approximately 11 hours ago  She was using a small chainsaw type device when she sustained a small laceration to the left distal forearm/wrist region on the ulnar aspect  She cleaned the wound and applied adhesive dressing but it peeled up overnight and she decided come here for stitches  Denies other injury  Denies numbness tingling weakness denies arterial hemorrhage  Denies blood thinner use  Denies any significant drug allergies  Reports tetanus not up-to-date  Prior to Admission Medications   Prescriptions Last Dose Informant Patient Reported? Taking?   acetaminophen (TYLENOL) 325 mg tablet   No No   Sig: Take 2 tablets (650 mg total) by mouth every 6 (six) hours as needed for mild pain, headaches or fever      Facility-Administered Medications: None       Past Medical History:   Diagnosis Date   • Arthritis     hand   • Irregular heart beat    • Mononucleosis 2019   • Seasonal allergies        Past Surgical History:   Procedure Laterality Date   • APPENDECTOMY     • BONE EXOSTOSIS EXCISION Right 2020    Procedure: excision right hook of hamate fracture, right guyon canal release;  Surgeon:  Gisselle Kennedy MD;  Location: [facility] MAIN OR;  Service: Orthopedics   •  SECTION     • COLONOSCOPY     • HYSTERECTOMY         • JOINT REPLACEMENT      BTKR       Family History   Problem Relation Age of Onset   • No Known Problems Mother    • No Known Problems Sister    • Diabetes Sister    • Obesity Sister    • No Known Problems Maternal Aunt    • No Known Problems Maternal Aunt    • No Known Problems Paternal Aunt    • Cancer Paternal Aunt    • No Known Problems Paternal Aunt • Prostate cancer Father    • Diabetes Father    • Heart disease Father    • Cancer Maternal Grandfather         patient not sure what kind   • Stroke Paternal Grandmother    • Prostate cancer Paternal Grandfather    • Diabetes Brother    • Diabetes Brother      I have reviewed and agree with the history as documented  E-Cigarette/Vaping   • E-Cigarette Use Never User      E-Cigarette/Vaping Substances   • Nicotine No    • THC No    • CBD No    • Flavoring No    • Other No    • Unknown No      Social History     Tobacco Use   • Smoking status: Never   • Smokeless tobacco: Never   Vaping Use   • Vaping Use: Never used   Substance Use Topics   • Alcohol use: Yes     Comment: occassional   • Drug use: Never       Review of Systems   Constitutional: Negative for chills and fever  HENT: Negative for ear pain and sore throat  Eyes: Negative for pain and visual disturbance  Respiratory: Negative for cough and shortness of breath  Cardiovascular: Negative for chest pain and palpitations  Gastrointestinal: Negative for abdominal pain and vomiting  Genitourinary: Negative for dysuria and hematuria  Musculoskeletal: Negative for arthralgias and back pain  Skin: Positive for wound  Negative for color change and rash  Neurological: Negative for seizures and syncope  All other systems reviewed and are negative  Physical Exam  Physical Exam  Vitals and nursing note reviewed  Constitutional:       General: She is not in acute distress  Appearance: She is well-developed  HENT:      Head: Normocephalic and atraumatic  Eyes:      Conjunctiva/sclera: Conjunctivae normal    Cardiovascular:      Rate and Rhythm: Normal rate and regular rhythm  Heart sounds: No murmur heard  Comments: 2+ radial and ulnar pulses on the left  Pulmonary:      Effort: Pulmonary effort is normal  No respiratory distress  Breath sounds: Normal breath sounds  Abdominal:      Palpations: Abdomen is soft  Tenderness: There is no abdominal tenderness  Musculoskeletal:         General: No swelling  Cervical back: Neck supple  Comments: There is a 2 cm linear laceration on the ulnar aspect of the right wrist region which does not involve the wrist joint capsule  No evidence of arterial injury  No evidence of foreign body  Skin:     General: Skin is warm and dry  Capillary Refill: Capillary refill takes less than 2 seconds  Neurological:      General: No focal deficit present  Mental Status: She is alert  Mental status is at baseline  Comments: Normal median, ulnar, radial nerve sensory and motor function in the left upper extremity   Psychiatric:         Mood and Affect: Mood normal          Vital Signs  ED Triage Vitals [02/21/23 0636]   Temperature Pulse Respirations Blood Pressure SpO2   (!) 97 2 °F (36 2 °C) (!) 50 18 131/70 98 %      Temp Source Heart Rate Source Patient Position - Orthostatic VS BP Location FiO2 (%)   Temporal Monitor Sitting Right arm --      Pain Score       5           Vitals:    02/21/23 0636   BP: 131/70   Pulse: (!) 50   Patient Position - Orthostatic VS: Sitting         Visual Acuity      ED Medications  Medications   lidocaine-epinephrine (XYLOCAINE/EPINEPHRINE) 1 %-1:100,000 injection 10 mL (10 mL Infiltration Given 2/21/23 0744)   tetanus-diphtheria-acellular pertussis (BOOSTRIX) IM injection 0 5 mL (0 5 mL Intramuscular Given 2/21/23 0745)   bacitracin topical ointment 1 small application (1 small application Topical Given 2/21/23 0744)       Diagnostic Studies  Results Reviewed     None                 No orders to display              Procedures  Laceration repair    Date/Time: 2/21/2023 7:10 AM  Performed by: Grecia Ambrosio DO  Authorized by: Grecia Ambrosio DO   Consent: Verbal consent obtained    Risks and benefits: risks, benefits and alternatives were discussed  Consent given by: patient  Patient understanding: patient states understanding of the procedure being performed  Patient identity confirmed: verbally with patient  Body area: upper extremity  Location details: left wrist  Laceration length: 2 cm  Foreign bodies: no foreign bodies  Tendon involvement: none  Nerve involvement: none  Vascular damage: no    Anesthesia:  Local Anesthetic: lidocaine 1% with epinephrine  Anesthetic total: 4 mL    Sedation:  Patient sedated: no      Wound Dehiscence:  Superficial Wound Dehiscence: simple closure      Procedure Details:  Preparation: Patient was prepped and draped in the usual sterile fashion  Irrigation solution: saline  Irrigation method: syringe  Amount of cleaning: standard  Debridement: none  Degree of undermining: none  Skin closure: Ethilon  Number of sutures: 4  Technique: simple  Approximation: close  Approximation difficulty: simple  Dressing: antibiotic ointment  Patient tolerance: patient tolerated the procedure well with no immediate complications               ED Course  ED Course as of 02/21/23 0747   Tue Feb 21, 2023   0707 Pulse(!): 50  Chronic, stable                                             Medical Decision Making  61-year-old female presenting with left wrist laceration approximate 6 hours old previously cleaned and dressed at home presenting here as thinks need stitches  Wound is in need of laceration repair with sutures  No evidence of neurovascular injury or tendon injury  No evidence of joint capsule involvement  Discussed outpatient supportive care/expectant management return to ED precautions we will update tetanus here in the ER prior to discharge  Wound appears clean and dry will be irrigated and topical antibiotic ointment applied do not feel oral antibiotics are needed at this time  Risk  OTC drugs  Prescription drug management            Disposition  Final diagnoses:   Laceration of left wrist, initial encounter     Time reflects when diagnosis was documented in both MDM as applicable and the Disposition within this note     Time User Action Codes Description Comment    2/21/2023  7:16 AM Kaykay Hint Add [T23 256N] Laceration of left wrist, initial encounter       ED Disposition     ED Disposition   Discharge    Condition   Stable    Date/Time   Tue Feb 21, 2023  7:47 AM    Comment   [facility] discharge to home/self care  Follow-up Information     Follow up With Specialties Details Why Contact Info Additional Information    Lorri Verdin DO Family Medicine Schedule an appointment as soon as possible for a visit   [facility] [phone] [address] [facility] Emergency Department Emergency Medicine Go to  7-10 days, For suture removal [facility] [phone] [address] [facility] Emergency Department, [address], [address]          Patient's Medications   Discharge Prescriptions    No medications on file       No discharge procedures on file      PDMP Review     None          ED Provider  Electronically Signed by           Tamir Aldana DO  02/21/23 4867

## 2023-02-21 NOTE — Clinical Note
Jolene Mtz was seen and treated in our emergency department on 2/21/2023  Diagnosis:     Gail Simmons    She may return on this date:     Seen and examined in the emergency department on 2/21/2023  Please excuse for absence from work  If you have any questions or concerns, please don't hesitate to call        Jessica Shea, DO    ______________________________           _______________          _______________  Hospital Representative                              Date                                Time

## 2023-02-21 NOTE — DISCHARGE INSTRUCTIONS
Thank you for visiting the Emergency Department today  Return in 7-10 days for suture removal   Keep clean and dry  May gently wash with soap and water after first 24 hours  Change dressing daily as needed  May reapply antibiotic ointment if desired

## 2023-02-21 NOTE — Clinical Note
Rabia Goldberg was seen and treated in our emergency department on 2/21/2023  Diagnosis:     Brina Kovacs    She may return on this date:     Seen and examined in the emergency department on 2/21/2023  Please excuse for absence from work  If you have any questions or concerns, please don't hesitate to call        Ludivina De Guzman DO    ______________________________           _______________          _______________  Hospital Representative                              Date                                Time

## 2023-09-18 ENCOUNTER — OFFICE VISIT (OUTPATIENT)
Dept: URGENT CARE | Facility: MEDICAL CENTER | Age: 58
End: 2023-09-18
Payer: COMMERCIAL

## 2023-09-18 VITALS
DIASTOLIC BLOOD PRESSURE: 72 MMHG | SYSTOLIC BLOOD PRESSURE: 122 MMHG | RESPIRATION RATE: 18 BRPM | BODY MASS INDEX: 39.7 KG/M2 | HEART RATE: 62 BPM | HEIGHT: 66 IN | TEMPERATURE: 97.7 F | OXYGEN SATURATION: 96 % | WEIGHT: 247 LBS

## 2023-09-18 DIAGNOSIS — J06.9 ACUTE URI: ICD-10-CM

## 2023-09-18 DIAGNOSIS — R09.89 CHEST CONGESTION: Primary | ICD-10-CM

## 2023-09-18 DIAGNOSIS — R11.0 NAUSEA: ICD-10-CM

## 2023-09-18 PROCEDURE — 99212 OFFICE O/P EST SF 10 MIN: CPT

## 2023-09-18 RX ORDER — AZITHROMYCIN 250 MG/1
TABLET, FILM COATED ORAL
Qty: 6 TABLET | Refills: 0 | Status: SHIPPED | OUTPATIENT
Start: 2023-09-18 | End: 2023-09-22

## 2023-09-18 RX ORDER — METHYLPREDNISOLONE 4 MG/1
TABLET ORAL
Qty: 1 EACH | Refills: 0 | Status: SHIPPED | OUTPATIENT
Start: 2023-09-18

## 2023-09-18 RX ORDER — ONDANSETRON 4 MG/1
4 TABLET, ORALLY DISINTEGRATING ORAL ONCE
Status: COMPLETED | OUTPATIENT
Start: 2023-09-18 | End: 2023-09-18

## 2023-09-18 RX ORDER — GUAIFENESIN 600 MG/1
1200 TABLET, EXTENDED RELEASE ORAL EVERY 12 HOURS SCHEDULED
Qty: 30 TABLET | Refills: 0 | Status: SHIPPED | OUTPATIENT
Start: 2023-09-18

## 2023-09-18 RX ADMIN — ONDANSETRON 4 MG: 4 TABLET, ORALLY DISINTEGRATING ORAL at 11:40

## 2023-09-18 NOTE — PROGRESS NOTES
[facility]        NAME: Mary Sumner is a 59 y.o. female  : 1965    MRN: [id number]  DATE: 2023  TIME: 11:46 AM    Assessment and Plan   Chest congestion [R09.89]  1. Chest congestion  guaiFENesin (MUCINEX) 600 mg 12 hr tablet    azithromycin (ZITHROMAX) 250 mg tablet    methylPREDNISolone 4 MG tablet therapy pack      2. Acute URI  azithromycin (ZITHROMAX) 250 mg tablet    methylPREDNISolone 4 MG tablet therapy pack      3. Nausea  ondansetron (ZOFRAN-ODT) dispersible tablet 4 mg    methylPREDNISolone 4 MG tablet therapy pack            Patient Instructions       Follow up with PCP in 3-5 days. Proceed to  ER if symptoms worsen. Chief Complaint     Chief Complaint   Patient presents with   • Cold Like Symptoms     X 3 weeks, cough and congestion   • Cough   • Nausea   • Diarrhea     Started this am. 4 episodes   • Fatigue         History of Present Illness       Patient has had cold symptoms x3 weeks. Took multiple home covid test which were negative. Also taking sudafed and then switched to claritin/zyrtec without much relief. No mucinex. Taking motrin PRN for chills/fever. Started with nausea and diarrhea this AM x4 soft stools. Review of Systems   Review of Systems   Constitutional: Positive for appetite change and chills. Negative for fatigue and fever. HENT: Positive for congestion, ear pain, postnasal drip, rhinorrhea, sinus pressure and sinus pain. Negative for sore throat and trouble swallowing. Respiratory: Positive for cough. Negative for shortness of breath. Cough productive for yellow mucus     Cardiovascular: Negative for chest pain and palpitations. Gastrointestinal: Positive for diarrhea and nausea. Negative for abdominal pain, constipation and vomiting. Musculoskeletal: Negative for arthralgias, back pain and myalgias. Skin: Negative for color change and rash. Neurological: Positive for dizziness and headaches.  Negative for seizures, syncope and light-headedness. All other systems reviewed and are negative. Current Medications       Current Outpatient Medications:   •  azithromycin (ZITHROMAX) 250 mg tablet, Take 2 tablets today then 1 tablet daily x 4 days, Disp: 6 tablet, Rfl: 0  •  guaiFENesin (MUCINEX) 600 mg 12 hr tablet, Take 2 tablets (1,200 mg total) by mouth every 12 (twelve) hours, Disp: 30 tablet, Rfl: 0  •  methylPREDNISolone 4 MG tablet therapy pack, Use as directed on package, Disp: 1 each, Rfl: 0  •  acetaminophen (TYLENOL) 325 mg tablet, Take 2 tablets (650 mg total) by mouth every 6 (six) hours as needed for mild pain, headaches or fever (Patient not taking: Reported on 2023), Disp: 30 tablet, Rfl: 0  No current facility-administered medications for this visit. Current Allergies     Allergies as of 2023 - Reviewed 2023   Allergen Reaction Noted   • Amoxicillin-pot clavulanate Hives, Itching, Wheezing, and Facial Swelling 2016   • Bee venom Anaphylaxis 2017   • Celecoxib Rash 2019            The following portions of the patient's history were reviewed and updated as appropriate: allergies, current medications, past family history, past medical history, past social history, past surgical history and problem list.     Past Medical History:   Diagnosis Date   • Arthritis     hand   • Irregular heart beat    • Mononucleosis 2019   • Seasonal allergies        Past Surgical History:   Procedure Laterality Date   • APPENDECTOMY     • BONE EXOSTOSIS EXCISION Right 2020    Procedure: excision right hook of hamate fracture, right guyon canal release;  Surgeon:  Teresa March MD;  Location: [address] [place] [address] OR;  Service: Orthopedics   •  SECTION     • COLONOSCOPY     • HYSTERECTOMY         • JOINT REPLACEMENT      BTKR       Family History   Problem Relation Age of Onset   • No Known Problems Mother    • No Known Problems Sister    • Diabetes Sister    • Obesity Sister • No Known Problems Maternal Aunt    • No Known Problems Maternal Aunt    • No Known Problems Paternal Aunt    • Cancer Paternal Aunt    • No Known Problems Paternal Aunt    • Prostate cancer Father    • Diabetes Father    • Heart disease Father    • Cancer Maternal Grandfather         patient not sure what kind   • Stroke Paternal Grandmother    • Prostate cancer Paternal Grandfather    • Diabetes Brother    • Diabetes Brother          Medications have been verified. Objective   /72   Pulse 62   Temp 97.7 °F (36.5 °C)   Resp 18   Ht 5' 6" (1.676 m)   Wt 112 kg (247 lb)   SpO2 96%   BMI 39.87 kg/m²        Physical Exam     Physical Exam  Vitals and nursing note reviewed. Constitutional:       General: She is not in acute distress. Appearance: Normal appearance. She is normal weight. She is not ill-appearing. HENT:      Head: Normocephalic and atraumatic. Right Ear: Ear canal and external ear normal. Tympanic membrane is bulging. Left Ear: Ear canal and external ear normal. Tympanic membrane is bulging. Nose: Congestion and rhinorrhea present. Rhinorrhea is clear. Right Sinus: Maxillary sinus tenderness and frontal sinus tenderness present. Left Sinus: Maxillary sinus tenderness and frontal sinus tenderness present. Mouth/Throat:      Lips: Pink. Mouth: Mucous membranes are moist.      Pharynx: Oropharynx is clear. Uvula midline. Posterior oropharyngeal erythema present. No pharyngeal swelling, oropharyngeal exudate or uvula swelling. Tonsils: No tonsillar exudate or tonsillar abscesses. 0 on the right. 0 on the left. Comments: Postnasal drip irritation noted  Eyes:      Extraocular Movements: Extraocular movements intact. Conjunctiva/sclera: Conjunctivae normal.      Pupils: Pupils are equal, round, and reactive to light. Cardiovascular:      Rate and Rhythm: Normal rate and regular rhythm. Pulses: Normal pulses.       Heart sounds: Normal heart sounds, S1 normal and S2 normal. No murmur heard. Pulmonary:      Effort: Pulmonary effort is normal.      Breath sounds: Normal breath sounds. Abdominal:      General: Abdomen is flat. Bowel sounds are normal.      Palpations: Abdomen is soft. Musculoskeletal:         General: Normal range of motion. Cervical back: Normal range of motion and neck supple. Skin:     General: Skin is warm and dry. Capillary Refill: Capillary refill takes less than 2 seconds. Findings: No rash. Neurological:      General: No focal deficit present. Mental Status: She is alert and oriented to person, place, and time.    Psychiatric:         Mood and Affect: Mood normal.         Behavior: Behavior normal.

## 2023-09-18 NOTE — PATIENT INSTRUCTIONS
You may take over the counter Tylenol (Acetaminophen) and/or Motrin (Ibuprofen) as needed, as directed on packaging. Be sure to get plenty of rest, and drinking fluids to remain hydrated. Over the counter decongestants can be used, only as directed on the box. However if you have any history of cardiac disease or high blood pressure these should be avoided, as we caution the use of these since they can make place strain on your heart and cause increase in blood pressure. It is recommended in lieu of decongestants to use cool mist vaporizer, saline nasal spray to relieve nasal congestion. It is also important to remain hydrated and drink plenty of fluids (avoiding caffeine and alcohol). Please follow up with your primary provider in the next several days. Should you have any worsening of symptoms, or lack of improvement please be re-evaluated. If needed for significant concerns, consider 911 or ER evaluation.

## 2023-12-17 ENCOUNTER — OFFICE VISIT (OUTPATIENT)
Dept: URGENT CARE | Facility: CLINIC | Age: 58
End: 2023-12-17
Payer: COMMERCIAL

## 2023-12-17 VITALS
RESPIRATION RATE: 18 BRPM | DIASTOLIC BLOOD PRESSURE: 73 MMHG | SYSTOLIC BLOOD PRESSURE: 130 MMHG | OXYGEN SATURATION: 99 % | HEART RATE: 60 BPM | TEMPERATURE: 98 F

## 2023-12-17 DIAGNOSIS — J01.40 ACUTE NON-RECURRENT PANSINUSITIS: Primary | ICD-10-CM

## 2023-12-17 DIAGNOSIS — H10.31 ACUTE BACTERIAL CONJUNCTIVITIS OF RIGHT EYE: ICD-10-CM

## 2023-12-17 PROCEDURE — 99213 OFFICE O/P EST LOW 20 MIN: CPT | Performed by: NURSE PRACTITIONER

## 2023-12-17 RX ORDER — TOBRAMYCIN 3 MG/ML
1 SOLUTION/ DROPS OPHTHALMIC
Qty: 5 ML | Refills: 0 | Status: SHIPPED | OUTPATIENT
Start: 2023-12-17

## 2023-12-17 RX ORDER — TOBRAMYCIN 3 MG/ML
1 SOLUTION/ DROPS OPHTHALMIC
Qty: 5 ML | Refills: 0 | Status: SHIPPED | OUTPATIENT
Start: 2023-12-17 | End: 2023-12-17

## 2023-12-17 RX ORDER — METHYLPREDNISOLONE 4 MG/1
TABLET ORAL
Qty: 1 EACH | Refills: 0 | Status: SHIPPED | OUTPATIENT
Start: 2023-12-17

## 2023-12-17 RX ORDER — METHYLPREDNISOLONE 4 MG/1
TABLET ORAL
Qty: 1 EACH | Refills: 0 | Status: SHIPPED | OUTPATIENT
Start: 2023-12-17 | End: 2023-12-17

## 2023-12-17 RX ORDER — AZITHROMYCIN 250 MG/1
TABLET, FILM COATED ORAL
Qty: 6 TABLET | Refills: 0 | Status: SHIPPED | OUTPATIENT
Start: 2023-12-17 | End: 2023-12-21

## 2023-12-17 RX ORDER — AZITHROMYCIN 250 MG/1
TABLET, FILM COATED ORAL
Qty: 6 TABLET | Refills: 0 | Status: SHIPPED | OUTPATIENT
Start: 2023-12-17 | End: 2023-12-17

## 2023-12-17 NOTE — PROGRESS NOTES
[facility]        NAME: Kamila Thompson is a 58 y.o. female  : 1965    MRN: [id number]  DATE: 2023  TIME: 2:10 PM      Assessment and Plan     Acute non-recurrent pansinusitis [J01.40]  1. Acute non-recurrent pansinusitis  azithromycin (ZITHROMAX) 250 mg tablet    methylPREDNISolone 4 MG tablet therapy pack    DISCONTINUED: azithromycin (ZITHROMAX) 250 mg tablet    DISCONTINUED: methylPREDNISolone 4 MG tablet therapy pack      2. Acute bacterial conjunctivitis of right eye  tobramycin (TOBREX) 0.3 % SOLN    DISCONTINUED: tobramycin (TOBREX) 0.3 % SOLN            Patient Instructions     Patient Instructions   Change your pillow case tonight and tomorrow night.  Use a clean cloth each time you wipe your eye.  Practice careful handwashing, especially for the first 24 hours of treatment.  If the other eye becomes symptomatic, you may use the same drops the same way for that eye as well.  For the first 24 hours, it is alright to use the drops every 2 hours while awake; after that resume the every 4 hours while awake frequency.   Conjunctivitis   AMBULATORY CARE:   Conjunctivitis , or pink eye, is inflammation of your conjunctiva. The conjunctiva is a thin tissue that covers the front of your eye and the back of your eyelid. The conjunctiva helps protect your eye and keep it moist. The most common cause of conjunctivitis is infection with bacteria or a virus. Allergies or exposure to a chemical may also cause conjunctivitis. Conjunctivitis is easily spread from person to person.       Common signs or symptoms:  You may have symptoms in one or both eyes. You may also have other general symptoms, including a sore throat, runny nose, or fever. You may have any of the following:  Redness in the whites of your eye    Itching in or around your eye    Feeling like there is something in your eye    Watery or thick, sticky discharge    Crusty eyelids when you wake up in the morning    Burning,  stinging, or swelling in your eye    Pain when you see bright light    Seek care immediately if:   You have worsening eye pain.    The swelling in your eye gets worse, even after treatment.    Your vision suddenly becomes worse, or you cannot see at all.    Call your doctor if:   Your start to notice changes in your vision.    You develop a fever and ear pain.    You have tiny bumps or spots of blood on your eye.    You have questions or concerns about your condition or care.    Treatment:  Your conjunctivitis may go away on its own. Treatment depends on what is causing your conjunctivitis. You may need any of the following:  Allergy medicine  helps decrease itchy, red, swollen eyes caused by allergies. It may be given as a pill, eye drops, or nasal spray.    Antibiotics  may be needed if your conjunctivitis is caused by bacteria. This medicine may be given as a pill, eye drops, or eye ointment.    Manage your symptoms:   Apply a cool compress.  Wet a washcloth with cold water and place it on your eye. This will help decrease itching and irritation.    Use artificial tears.  This will help lessen your symptoms, including itching or irritation.    Do not wear contact lenses  until treatment is complete and your symptoms are gone.    Flush your eye.  You may need to flush your eye with saline to help decrease your symptoms. Ask for more information on how to flush your eye.    Prevent the spread of conjunctivitis:   Wash your hands with soap and water often.  Wash your hands before and after you touch your eyes. Wash your hands after you use the bathroom, change a child's diaper, or sneeze. Wash your hands before you prepare or eat food.         Avoid contact with others.  Do not share towels or washcloths. Try to stay away from others as much as possible. Ask when you can return to work or school.    Avoid allergens and irritants.  Try to avoid the things that cause your allergies, such as pets, dust, or grass. Stay  away from smoke filled areas. Shield your eyes from wind and sun.    Throw away eye makeup.  Bacteria can stay in eye makeup. Throw away your current mascara and other eye makeup. Never share mascara or other eye makeup with anyone.    Follow up with your doctor as directed:  You may be referred to an ophthalmologist for treatment. Write down your questions so you remember to ask them during your visits.  © Copyright [employer] 2023 Information is for End User's use only and may not be sold, redistributed or otherwise used for commercial purposes.  The above information is an  only. It is not intended as medical advice for individual conditions or treatments. Talk to your doctor, nurse or pharmacist before following any medical regimen to see if it is safe and effective for you.    Sinusitis   AMBULATORY CARE:   Sinusitis  is inflammation or infection of your sinuses. Sinusitis is most often caused by a virus. Acute sinusitis may last up to 12 weeks. Chronic sinusitis lasts longer than 12 weeks. Recurrent sinusitis means you have 4 or more infections in 1 year.        Common signs and symptoms:   Fever    Pain, pressure, redness, or swelling around the forehead, cheeks, or eyes    Thick yellow or green discharge from your nose    Tenderness when you touch your face over your sinuses    Dry cough that happens mostly at night or when you lie down    Headache and face pain that is worse when you lean forward    Tooth pain, or pain when you chew    Seek care immediately if:   You have trouble breathing or wheezing that is getting worse.    You have a stiff neck, a fever, or a bad headache.     You cannot open your eye.     Your eyeball bulges out or you cannot move your eye.     You are more sleepy than normal, or you notice changes in your ability to think, move, or talk.    You have swelling of your forehead or scalp.    Call your doctor if:   You have vision changes, such as double vision.    Your eye  and eyelid are red, swollen, and painful.     Your symptoms do not improve or go away after 10 days.    You have nausea and are vomiting.    Your nose is bleeding.    You have questions or concerns about your condition or care.    Medicines:  Your symptoms may go away on their own. Your healthcare provider may recommend watchful waiting for up to 10 days before starting antibiotics. You may need any of the following:  Acetaminophen  decreases pain and fever. It is available without a doctor's order. Ask how much to take and how often to take it. Follow directions. Read the labels of all other medicines you are using to see if they also contain acetaminophen, or ask your doctor or pharmacist. Acetaminophen can cause liver damage if not taken correctly.    NSAIDs , such as ibuprofen, help decrease swelling, pain, and fever. This medicine is available with or without a doctor's order. NSAIDs can cause stomach bleeding or kidney problems in certain people. If you take blood thinner medicine, always ask your healthcare provider if NSAIDs are safe for you. Always read the medicine label and follow directions.    Nasal steroid sprays  may help decrease inflammation in your nose and sinuses.    Decongestants  help reduce swelling and drain mucus in the nose and sinuses. They may help you breathe easier.     Antihistamines  help dry mucus in the nose and relieve sneezing.     Antibiotics  help treat or prevent a bacterial infection.    Self-care:   Rinse your sinuses as directed.  Use a sinus rinse device to rinse your nasal passages with a saline (salt water) solution or distilled water. Do not use tap water. This will help thin the mucus in your nose and rinse away pollen and dirt. It will also help reduce swelling so you can breathe normally.    Use a humidifier  to increase air moisture in your home. This may make it easier for you to breathe and help decrease your cough.     Sleep with your head elevated.  Place an  extra pillow under your head before you go to sleep to help your sinuses drain.     Drink liquids as directed.  Ask your healthcare provider how much liquid to drink each day and which liquids are best for you. Liquids will thin the mucus in your nose and help it drain. Avoid drinks that contain alcohol or caffeine.     Do not smoke, and avoid secondhand smoke.  Nicotine and other chemicals in cigarettes and cigars can make your symptoms worse. Ask your healthcare provider for information if you currently smoke and need help to quit. E-cigarettes or smokeless tobacco still contain nicotine. Talk to your healthcare provider before you use these products.    Prevent the spread of germs:   Wash your hands often with soap and water.  Wash your hands after you use the bathroom, change a child's diaper, or sneeze. Wash your hands before you prepare or eat food.         Stay away from people who are sick.  Some germs spread easily and quickly through contact.    Follow up with your doctor as directed:  You may be referred to an ear, nose, and throat specialist. Write down your questions so you remember to ask them during your visits.   © Copyright [employer] 2023 Information is for End User's use only and may not be sold, redistributed or otherwise used for commercial purposes.  The above information is an  only. It is not intended as medical advice for individual conditions or treatments. Talk to your doctor, nurse or pharmacist before following any medical regimen to see if it is safe and effective for you.      Follow up with PCP in 3-5 days.  Proceed to  ER if symptoms worsen.    Chief Complaint     Chief Complaint   Patient presents with    Cough     Pt c/o cough, fever and body aches since Tuesday.          History of Present Illness     Onset of s/s illness Tues evening.  Tested for covid Wed morning and was negative.  Multiple sick contacts at work.  Lots of nasal congestion, ear discomfort.  Some  coughing.  Has a prn albuterol inhaler but has not needed it this illness.  Onset of right eye pink eye symptoms today.    Cough  Associated symptoms include ear pain, eye redness and a sore throat.       Review of Systems     Review of Systems   HENT:  Positive for congestion, ear pain, sinus pressure, sinus pain and sore throat.    Eyes:  Positive for discharge, redness and itching.   Respiratory:  Positive for cough.    All other systems reviewed and are negative.        Current Medications       Current Outpatient Medications:     azithromycin (ZITHROMAX) 250 mg tablet, Take 2 tablets today then 1 tablet daily x 4 days, Disp: 6 tablet, Rfl: 0    methylPREDNISolone 4 MG tablet therapy pack, Use as directed on package, Disp: 1 each, Rfl: 0    tobramycin (TOBREX) 0.3 % SOLN, Administer 1 drop to the right eye every 4 (four) hours while awake, Disp: 5 mL, Rfl: 0    acetaminophen (TYLENOL) 325 mg tablet, Take 2 tablets (650 mg total) by mouth every 6 (six) hours as needed for mild pain, headaches or fever (Patient not taking: Reported on 9/18/2023), Disp: 30 tablet, Rfl: 0    guaiFENesin (MUCINEX) 600 mg 12 hr tablet, Take 2 tablets (1,200 mg total) by mouth every 12 (twelve) hours, Disp: 30 tablet, Rfl: 0    Current Allergies     Allergies as of 12/17/2023 - Reviewed 12/17/2023   Allergen Reaction Noted    Amoxicillin-pot clavulanate Hives, Itching, Wheezing, and Facial Swelling 12/02/2016    Bee venom Anaphylaxis 12/12/2017    Celecoxib Rash 09/07/2019              The following portions of the patient's history were reviewed and updated as appropriate: allergies, current medications, past family history, past medical history, past social history, past surgical history and problem list.     Past Medical History:   Diagnosis Date    Arthritis     hand    Irregular heart beat     Mononucleosis 09/18/2019    Seasonal allergies        Past Surgical History:   Procedure Laterality Date    APPENDECTOMY      BONE  EXOSTOSIS EXCISION Right 2020    Procedure: excision right hook of hamate fracture, right guyon canal release;  Surgeon: Jose Chan MD;  Location:  MAIN OR;  Service: Orthopedics     SECTION      COLONOSCOPY      HYSTERECTOMY      1994    JOINT REPLACEMENT      BTKR       Family History   Problem Relation Age of Onset    No Known Problems Mother     No Known Problems Sister     Diabetes Sister     Obesity Sister     No Known Problems Maternal Aunt     No Known Problems Maternal Aunt     No Known Problems Paternal Aunt     Cancer Paternal Aunt     No Known Problems Paternal Aunt     Prostate cancer Father     Diabetes Father     Heart disease Father     Cancer Maternal Grandfather         patient not sure what kind    Stroke Paternal Grandmother     Prostate cancer Paternal Grandfather     Diabetes Brother     Diabetes Brother          Medications have been verified.        Objective     /73   Pulse 60   Temp 98 °F (36.7 °C)   Resp 18   SpO2 99%   No LMP recorded. Patient has had a hysterectomy.         Physical Exam     Physical Exam  Vitals and nursing note reviewed.   Constitutional:       General: She is not in acute distress.     Appearance: Normal appearance. She is well-developed and well-groomed. She is ill-appearing. She is not toxic-appearing or diaphoretic.   HENT:      Head: Normocephalic and atraumatic.      Right Ear: Ear canal and external ear normal. Tenderness present. A middle ear effusion is present. Tympanic membrane is bulging. Tympanic membrane is not injected or erythematous.      Left Ear: Ear canal and external ear normal. Tenderness present. A middle ear effusion is present. Tympanic membrane is bulging. Tympanic membrane is not injected or erythematous.      Nose: Mucosal edema and congestion present.      Right Sinus: Maxillary sinus tenderness and frontal sinus tenderness present.      Left Sinus: Maxillary sinus tenderness and frontal sinus tenderness  present.      Mouth/Throat:      Mouth: Mucous membranes are moist.      Pharynx: No oropharyngeal exudate or posterior oropharyngeal erythema.   Eyes:      Pupils: Pupils are equal, round, and reactive to light.   Cardiovascular:      Rate and Rhythm: Normal rate and regular rhythm.      Heart sounds: Normal heart sounds. No murmur heard.     No friction rub. No gallop.   Pulmonary:      Effort: Pulmonary effort is normal. No respiratory distress.      Breath sounds: Normal breath sounds. No stridor. No wheezing, rhonchi or rales.   Abdominal:      General: There is no distension.      Palpations: Abdomen is soft.   Musculoskeletal:         General: Normal range of motion.      Cervical back: Normal range of motion and neck supple.   Lymphadenopathy:      Cervical: Cervical adenopathy present.   Skin:     General: Skin is warm and dry.      Capillary Refill: Capillary refill takes less than 2 seconds.   Neurological:      General: No focal deficit present.      Mental Status: She is alert and oriented to person, place, and time.   Psychiatric:         Mood and Affect: Mood and affect normal.         Behavior: Behavior normal. Behavior is cooperative.         Thought Content: Thought content normal.         Judgment: Judgment normal.

## 2023-12-17 NOTE — PATIENT INSTRUCTIONS
Change your pillow case tonight and tomorrow night.  Use a clean cloth each time you wipe your eye.  Practice careful handwashing, especially for the first 24 hours of treatment.  If the other eye becomes symptomatic, you may use the same drops the same way for that eye as well.  For the first 24 hours, it is alright to use the drops every 2 hours while awake; after that resume the every 4 hours while awake frequency.   Conjunctivitis   AMBULATORY CARE:   Conjunctivitis , or pink eye, is inflammation of your conjunctiva. The conjunctiva is a thin tissue that covers the front of your eye and the back of your eyelid. The conjunctiva helps protect your eye and keep it moist. The most common cause of conjunctivitis is infection with bacteria or a virus. Allergies or exposure to a chemical may also cause conjunctivitis. Conjunctivitis is easily spread from person to person.       Common signs or symptoms:  You may have symptoms in one or both eyes. You may also have other general symptoms, including a sore throat, runny nose, or fever. You may have any of the following:  Redness in the whites of your eye    Itching in or around your eye    Feeling like there is something in your eye    Watery or thick, sticky discharge    Crusty eyelids when you wake up in the morning    Burning, stinging, or swelling in your eye    Pain when you see bright light    Seek care immediately if:   You have worsening eye pain.    The swelling in your eye gets worse, even after treatment.    Your vision suddenly becomes worse, or you cannot see at all.    Call your doctor if:   Your start to notice changes in your vision.    You develop a fever and ear pain.    You have tiny bumps or spots of blood on your eye.    You have questions or concerns about your condition or care.    Treatment:  Your conjunctivitis may go away on its own. Treatment depends on what is causing your conjunctivitis. You may need any of the following:  Allergy medicine   helps decrease itchy, red, swollen eyes caused by allergies. It may be given as a pill, eye drops, or nasal spray.    Antibiotics  may be needed if your conjunctivitis is caused by bacteria. This medicine may be given as a pill, eye drops, or eye ointment.    Manage your symptoms:   Apply a cool compress.  Wet a washcloth with cold water and place it on your eye. This will help decrease itching and irritation.    Use artificial tears.  This will help lessen your symptoms, including itching or irritation.    Do not wear contact lenses  until treatment is complete and your symptoms are gone.    Flush your eye.  You may need to flush your eye with saline to help decrease your symptoms. Ask for more information on how to flush your eye.    Prevent the spread of conjunctivitis:   Wash your hands with soap and water often.  Wash your hands before and after you touch your eyes. Wash your hands after you use the bathroom, change a child's diaper, or sneeze. Wash your hands before you prepare or eat food.         Avoid contact with others.  Do not share towels or washcloths. Try to stay away from others as much as possible. Ask when you can return to work or school.    Avoid allergens and irritants.  Try to avoid the things that cause your allergies, such as pets, dust, or grass. Stay away from smoke filled areas. Shield your eyes from wind and sun.    Throw away eye makeup.  Bacteria can stay in eye makeup. Throw away your current mascara and other eye makeup. Never share mascara or other eye makeup with anyone.    Follow up with your doctor as directed:  You may be referred to an ophthalmologist for treatment. Write down your questions so you remember to ask them during your visits.  © Copyright [employer] 2023 Information is for End User's use only and may not be sold, redistributed or otherwise used for commercial purposes.  The above information is an  only. It is not intended as medical advice for  individual conditions or treatments. Talk to your doctor, nurse or pharmacist before following any medical regimen to see if it is safe and effective for you.    Sinusitis   AMBULATORY CARE:   Sinusitis  is inflammation or infection of your sinuses. Sinusitis is most often caused by a virus. Acute sinusitis may last up to 12 weeks. Chronic sinusitis lasts longer than 12 weeks. Recurrent sinusitis means you have 4 or more infections in 1 year.        Common signs and symptoms:   Fever    Pain, pressure, redness, or swelling around the forehead, cheeks, or eyes    Thick yellow or green discharge from your nose    Tenderness when you touch your face over your sinuses    Dry cough that happens mostly at night or when you lie down    Headache and face pain that is worse when you lean forward    Tooth pain, or pain when you chew    Seek care immediately if:   You have trouble breathing or wheezing that is getting worse.    You have a stiff neck, a fever, or a bad headache.     You cannot open your eye.     Your eyeball bulges out or you cannot move your eye.     You are more sleepy than normal, or you notice changes in your ability to think, move, or talk.    You have swelling of your forehead or scalp.    Call your doctor if:   You have vision changes, such as double vision.    Your eye and eyelid are red, swollen, and painful.     Your symptoms do not improve or go away after 10 days.    You have nausea and are vomiting.    Your nose is bleeding.    You have questions or concerns about your condition or care.    Medicines:  Your symptoms may go away on their own. Your healthcare provider may recommend watchful waiting for up to 10 days before starting antibiotics. You may need any of the following:  Acetaminophen  decreases pain and fever. It is available without a doctor's order. Ask how much to take and how often to take it. Follow directions. Read the labels of all other medicines you are using to see if they also  contain acetaminophen, or ask your doctor or pharmacist. Acetaminophen can cause liver damage if not taken correctly.    NSAIDs , such as ibuprofen, help decrease swelling, pain, and fever. This medicine is available with or without a doctor's order. NSAIDs can cause stomach bleeding or kidney problems in certain people. If you take blood thinner medicine, always ask your healthcare provider if NSAIDs are safe for you. Always read the medicine label and follow directions.    Nasal steroid sprays  may help decrease inflammation in your nose and sinuses.    Decongestants  help reduce swelling and drain mucus in the nose and sinuses. They may help you breathe easier.     Antihistamines  help dry mucus in the nose and relieve sneezing.     Antibiotics  help treat or prevent a bacterial infection.    Self-care:   Rinse your sinuses as directed.  Use a sinus rinse device to rinse your nasal passages with a saline (salt water) solution or distilled water. Do not use tap water. This will help thin the mucus in your nose and rinse away pollen and dirt. It will also help reduce swelling so you can breathe normally.    Use a humidifier  to increase air moisture in your home. This may make it easier for you to breathe and help decrease your cough.     Sleep with your head elevated.  Place an extra pillow under your head before you go to sleep to help your sinuses drain.     Drink liquids as directed.  Ask your healthcare provider how much liquid to drink each day and which liquids are best for you. Liquids will thin the mucus in your nose and help it drain. Avoid drinks that contain alcohol or caffeine.     Do not smoke, and avoid secondhand smoke.  Nicotine and other chemicals in cigarettes and cigars can make your symptoms worse. Ask your healthcare provider for information if you currently smoke and need help to quit. E-cigarettes or smokeless tobacco still contain nicotine. Talk to your healthcare provider before you use  these products.    Prevent the spread of germs:   Wash your hands often with soap and water.  Wash your hands after you use the bathroom, change a child's diaper, or sneeze. Wash your hands before you prepare or eat food.         Stay away from people who are sick.  Some germs spread easily and quickly through contact.    Follow up with your doctor as directed:  You may be referred to an ear, nose, and throat specialist. Write down your questions so you remember to ask them during your visits.   © Copyright [employer] 2023 Information is for End User's use only and may not be sold, redistributed or otherwise used for commercial purposes.  The above information is an  only. It is not intended as medical advice for individual conditions or treatments. Talk to your doctor, nurse or pharmacist before following any medical regimen to see if it is safe and effective for you.

## 2023-12-19 ENCOUNTER — OFFICE VISIT (OUTPATIENT)
Dept: URGENT CARE | Facility: MEDICAL CENTER | Age: 58
End: 2023-12-19
Payer: COMMERCIAL

## 2023-12-19 VITALS
SYSTOLIC BLOOD PRESSURE: 122 MMHG | WEIGHT: 247 LBS | HEART RATE: 84 BPM | BODY MASS INDEX: 39.87 KG/M2 | TEMPERATURE: 97.9 F | OXYGEN SATURATION: 98 % | RESPIRATION RATE: 20 BRPM | DIASTOLIC BLOOD PRESSURE: 78 MMHG

## 2023-12-19 DIAGNOSIS — H66.93 BILATERAL OTITIS MEDIA, UNSPECIFIED OTITIS MEDIA TYPE: Primary | ICD-10-CM

## 2023-12-19 PROCEDURE — 99213 OFFICE O/P EST LOW 20 MIN: CPT | Performed by: PHYSICIAN ASSISTANT

## 2023-12-19 RX ORDER — CLINDAMYCIN HYDROCHLORIDE 300 MG/1
300 CAPSULE ORAL 4 TIMES DAILY
Qty: 28 CAPSULE | Refills: 0 | Status: SHIPPED | OUTPATIENT
Start: 2023-12-19 | End: 2023-12-26

## 2023-12-19 RX ORDER — FLUTICASONE PROPIONATE 50 MCG
2 SPRAY, SUSPENSION (ML) NASAL DAILY
Qty: 1 G | Refills: 0 | Status: SHIPPED | OUTPATIENT
Start: 2023-12-19

## 2023-12-19 NOTE — PROGRESS NOTES
[facility]        NAME: Kamila Thompson is a 58 y.o. female  : 1965    MRN: [id number]  DATE: 2023  TIME: 12:15 PM    Assessment and Plan   Bilateral otitis media, unspecified otitis media type [H66.93]  1. Bilateral otitis media, unspecified otitis media type  clindamycin (CLEOCIN) 300 MG capsule    fluticasone (FLONASE) 50 mcg/act nasal spray    loratadine-pseudoephedrine (CLARITIN-D 12-HOUR) 5-120 mg per tablet            Patient Instructions       Follow up with PCP in 3-5 days.  Proceed to  ER if symptoms worsen.    Chief Complaint     Chief Complaint   Patient presents with    Earache     B/L ear pain. Was in [place] on  and was prescribed x-pack and steroids. But last night pain got worse L>R.Still take the medications         History of Present Illness       Patient is a 58 year old female presenting [facility] with persistent left ear pain despite beginning Azithromycin 2 days ago.  Patient reports sinus and eye symptoms have improved but ear pain is persistent.  Pt is also taking a Medrol dose pack.  Patient denies CP or SOB.    Earache   There is pain in the left ear. This is a recurrent problem. The current episode started in the past 7 days. The problem occurs constantly. The problem has been gradually worsening. There has been no fever. Associated symptoms include coughing and rhinorrhea. Pertinent negatives include no abdominal pain, rash, sore throat or vomiting.       Review of Systems   Review of Systems   Constitutional:  Negative for chills and fever.   HENT:  Positive for congestion, ear pain and rhinorrhea. Negative for sore throat.    Eyes:  Negative for pain and visual disturbance.   Respiratory:  Positive for cough. Negative for shortness of breath.    Cardiovascular:  Negative for chest pain and palpitations.   Gastrointestinal:  Negative for abdominal pain and vomiting.   Genitourinary:  Negative for dysuria and hematuria.   Musculoskeletal:   Negative for arthralgias and back pain.   Skin:  Negative for color change and rash.   Neurological:  Negative for seizures and syncope.   All other systems reviewed and are negative.        Current Medications       Current Outpatient Medications:     acetaminophen (TYLENOL) 325 mg tablet, Take 2 tablets (650 mg total) by mouth every 6 (six) hours as needed for mild pain, headaches or fever, Disp: 30 tablet, Rfl: 0    azithromycin (ZITHROMAX) 250 mg tablet, Take 2 tablets today then 1 tablet daily x 4 days, Disp: 6 tablet, Rfl: 0    clindamycin (CLEOCIN) 300 MG capsule, Take 1 capsule (300 mg total) by mouth 4 (four) times a day for 7 days, Disp: 28 capsule, Rfl: 0    fluticasone (FLONASE) 50 mcg/act nasal spray, 2 sprays into each nostril daily, Disp: 1 g, Rfl: 0    guaiFENesin (MUCINEX) 600 mg 12 hr tablet, Take 2 tablets (1,200 mg total) by mouth every 12 (twelve) hours, Disp: 30 tablet, Rfl: 0    loratadine-pseudoephedrine (CLARITIN-D 12-HOUR) 5-120 mg per tablet, Take 1 tablet by mouth 2 (two) times a day, Disp: 30 tablet, Rfl: 0    methylPREDNISolone 4 MG tablet therapy pack, Use as directed on package, Disp: 1 each, Rfl: 0    tobramycin (TOBREX) 0.3 % SOLN, Administer 1 drop to the right eye every 4 (four) hours while awake, Disp: 5 mL, Rfl: 0    Current Allergies     Allergies as of 12/19/2023 - Reviewed 12/19/2023   Allergen Reaction Noted    Amoxicillin-pot clavulanate Hives, Itching, Wheezing, and Facial Swelling 12/02/2016    Bee venom Anaphylaxis 12/12/2017    Celecoxib Rash 09/07/2019            The following portions of the patient's history were reviewed and updated as appropriate: allergies, current medications, past family history, past medical history, past social history, past surgical history and problem list.     Past Medical History:   Diagnosis Date    Arthritis     hand    Irregular heart beat     Mononucleosis 09/18/2019    Seasonal allergies        Past Surgical History:   Procedure  Laterality Date    APPENDECTOMY      BONE EXOSTOSIS EXCISION Right 2020    Procedure: excision right hook of hamate fracture, right guyon canal release;  Surgeon: Jose Chan MD;  Location:  MAIN OR;  Service: Orthopedics     SECTION      COLONOSCOPY      HYSTERECTOMY      1994    JOINT REPLACEMENT      BTKR       Family History   Problem Relation Age of Onset    No Known Problems Mother     No Known Problems Sister     Diabetes Sister     Obesity Sister     No Known Problems Maternal Aunt     No Known Problems Maternal Aunt     No Known Problems Paternal Aunt     Cancer Paternal Aunt     No Known Problems Paternal Aunt     Prostate cancer Father     Diabetes Father     Heart disease Father     Cancer Maternal Grandfather         patient not sure what kind    Stroke Paternal Grandmother     Prostate cancer Paternal Grandfather     Diabetes Brother     Diabetes Brother          Medications have been verified.        Objective   /78   Pulse 84   Temp 97.9 °F (36.6 °C)   Resp 20   Wt 112 kg (247 lb)   SpO2 98%   BMI 39.87 kg/m²   No LMP recorded. Patient has had a hysterectomy.       Physical Exam     Physical Exam  Constitutional:       Appearance: Normal appearance.   HENT:      Head: Normocephalic and atraumatic.      Right Ear: Tympanic membrane is erythematous and bulging.      Left Ear: Tympanic membrane is erythematous and bulging.      Nose: Congestion present.      Mouth/Throat:      Mouth: Mucous membranes are moist.   Eyes:      Extraocular Movements: Extraocular movements intact.      Conjunctiva/sclera: Conjunctivae normal.      Pupils: Pupils are equal, round, and reactive to light.   Cardiovascular:      Rate and Rhythm: Normal rate.   Pulmonary:      Effort: Pulmonary effort is normal.   Musculoskeletal:         General: Normal range of motion.      Cervical back: Normal range of motion and neck supple.   Skin:     General: Skin is warm and dry.      Capillary Refill:  Capillary refill takes less than 2 seconds.   Neurological:      General: No focal deficit present.      Mental Status: She is alert and oriented to person, place, and time.   Psychiatric:         Mood and Affect: Mood normal.         Behavior: Behavior normal.

## 2024-02-21 PROBLEM — R50.9 ACUTE FEBRILE ILLNESS: Status: RESOLVED | Noted: 2019-09-10 | Resolved: 2024-02-21

## 2024-02-21 PROBLEM — A41.9 SEPSIS (HCC): Status: RESOLVED | Noted: 2019-09-07 | Resolved: 2024-02-21

## 2024-02-26 ENCOUNTER — TELEPHONE (OUTPATIENT)
Dept: BARIATRICS | Facility: CLINIC | Age: 59
End: 2024-02-26

## 2024-02-26 NOTE — TELEPHONE ENCOUNTER
Called patient and left a message to give the office a call back to schedule an appointment with either the RD or MARCUS Provider( [facility]) in order to continue ordering [employer]. Last seen 9/2021. Made her aware that she needs  to be seen every 6 months in order to be able to purchase these products.

## 2024-03-15 ENCOUNTER — OFFICE VISIT (OUTPATIENT)
Dept: URGENT CARE | Facility: MEDICAL CENTER | Age: 59
End: 2024-03-15
Payer: COMMERCIAL

## 2024-03-15 ENCOUNTER — APPOINTMENT (OUTPATIENT)
Dept: RADIOLOGY | Facility: MEDICAL CENTER | Age: 59
End: 2024-03-15
Payer: COMMERCIAL

## 2024-03-15 VITALS
HEART RATE: 62 BPM | WEIGHT: 255 LBS | HEIGHT: 67 IN | SYSTOLIC BLOOD PRESSURE: 150 MMHG | OXYGEN SATURATION: 99 % | TEMPERATURE: 97 F | DIASTOLIC BLOOD PRESSURE: 85 MMHG | RESPIRATION RATE: 16 BRPM | BODY MASS INDEX: 40.02 KG/M2

## 2024-03-15 DIAGNOSIS — S69.91XA HAND INJURY, RIGHT, INITIAL ENCOUNTER: ICD-10-CM

## 2024-03-15 DIAGNOSIS — S62.619A CLOSED AVULSION FRACTURE OF PROXIMAL PHALANX OF FINGER, INITIAL ENCOUNTER: Primary | ICD-10-CM

## 2024-03-15 PROCEDURE — 73130 X-RAY EXAM OF HAND: CPT

## 2024-03-15 PROCEDURE — 29130 APPL FINGER SPLINT STATIC: CPT | Performed by: PHYSICIAN ASSISTANT

## 2024-03-15 PROCEDURE — 99213 OFFICE O/P EST LOW 20 MIN: CPT | Performed by: PHYSICIAN ASSISTANT

## 2024-03-15 NOTE — PATIENT INSTRUCTIONS
Take Tylenol or Motrin as needed for pain.  Keep splint in place until evaluated by Orthopedics.  Apply ice to the affected area, for 15 minutes every 2 hours. Do not apply ice directly to bear skin. Follow-up with orthopedics as soon as possible.

## 2024-03-15 NOTE — PROGRESS NOTES
[facility]        NAME: Kamila Thompson is a 58 y.o. female  : 1965    MRN: [id number]  DATE: March 15, 2024  TIME: 11:55 AM    Assessment and Plan   Closed avulsion fracture of proximal phalanx of finger, initial encounter [S62.619A]  1. Closed avulsion fracture of proximal phalanx of finger, initial encounter  Orthopedic injury treatment      2. Hand injury, right, initial encounter  XR hand 3+ vw right            Patient Instructions     Take Tylenol or Motrin as needed for pain.  Keep splint in place until evaluated by Orthopedics.  Apply ice to the affected area, for 15 minutes every 2 hours. Do not apply ice directly to bear skin. Follow-up with orthopedics as soon as possible.    Follow up with PCP in 3-5 days.  Proceed to  ER if symptoms worsen.    If tests have been performed at [facility], our office will contact you with results if changes need to be made to the care plan discussed with you at the visit.  [facility].    Chief Complaint     Chief Complaint   Patient presents with   • Finger Pain     Jammed finger right 5th finger          History of Present Illness       Patient was reaching for a glass this morning and her right 5th finger struck the refrigerator and she heard a sound. She is having pain placing 4th and 5th fingers together and having pain in right 5th MCP. Patient had previous right hand surgery in  by Dr Redmond.        Review of Systems   Review of Systems   Constitutional:  Negative for fever.   Musculoskeletal:         Right 5th finger pain   Skin:  Negative for wound.   Neurological:  Negative for weakness and numbness.         Current Medications       Current Outpatient Medications:   •  acetaminophen (TYLENOL) 325 mg tablet, Take 2 tablets (650 mg total) by mouth every 6 (six) hours as needed for mild pain, headaches or fever (Patient not taking: Reported on 3/15/2024), Disp: 30 tablet, Rfl: 0  •  fluticasone  (FLONASE) 50 mcg/act nasal spray, 2 sprays into each nostril daily (Patient not taking: Reported on 3/15/2024), Disp: 1 g, Rfl: 0  •  guaiFENesin (MUCINEX) 600 mg 12 hr tablet, Take 2 tablets (1,200 mg total) by mouth every 12 (twelve) hours (Patient not taking: Reported on 3/15/2024), Disp: 30 tablet, Rfl: 0  •  loratadine-pseudoephedrine (CLARITIN-D 12-HOUR) 5-120 mg per tablet, Take 1 tablet by mouth 2 (two) times a day (Patient not taking: Reported on 3/15/2024), Disp: 30 tablet, Rfl: 0  •  methylPREDNISolone 4 MG tablet therapy pack, Use as directed on package (Patient not taking: Reported on 3/15/2024), Disp: 1 each, Rfl: 0  •  tobramycin (TOBREX) 0.3 % SOLN, Administer 1 drop to the right eye every 4 (four) hours while awake (Patient not taking: Reported on 3/15/2024), Disp: 5 mL, Rfl: 0    Current Allergies     Allergies as of 03/15/2024 - Reviewed 03/15/2024   Allergen Reaction Noted   • Amoxicillin-pot clavulanate Hives, Itching, Wheezing, and Facial Swelling 2016   • Bee venom Anaphylaxis 2017   • Celecoxib Rash 2019            The following portions of the patient's history were reviewed and updated as appropriate: allergies, current medications, past family history, past medical history, past social history, past surgical history and problem list.     Past Medical History:   Diagnosis Date   • Arthritis     hand   • Irregular heart beat    • Mononucleosis 2019   • Seasonal allergies        Past Surgical History:   Procedure Laterality Date   • APPENDECTOMY     • BONE EXOSTOSIS EXCISION Right 2020    Procedure: excision right hook of hamate fracture, right guyon canal release;  Surgeon: Jose Chan MD;  Location:  MAIN OR;  Service: Orthopedics   •  SECTION     • COLONOSCOPY     • HYSTERECTOMY         • JOINT REPLACEMENT      BTKR       Family History   Problem Relation Age of Onset   • No Known Problems Mother    • No Known Problems Sister    • Diabetes  "Sister    • Obesity Sister    • No Known Problems Maternal Aunt    • No Known Problems Maternal Aunt    • No Known Problems Paternal Aunt    • Cancer Paternal Aunt    • No Known Problems Paternal Aunt    • Prostate cancer Father    • Diabetes Father    • Heart disease Father    • Cancer Maternal Grandfather         patient not sure what kind   • Stroke Paternal Grandmother    • Prostate cancer Paternal Grandfather    • Diabetes Brother    • Diabetes Brother          Medications have been verified.        Objective   /85   Pulse 62   Temp (!) 97 °F (36.1 °C)   Resp 16   Ht 5' 7\" (1.702 m)   Wt 116 kg (255 lb)   SpO2 99%   BMI 39.94 kg/m²   No LMP recorded. Patient has had a hysterectomy.       Physical Exam     Physical Exam  Vitals and nursing note reviewed.   Constitutional:       Appearance: Normal appearance.   HENT:      Head: Normocephalic and atraumatic.   Cardiovascular:      Rate and Rhythm: Normal rate and regular rhythm.   Pulmonary:      Effort: Pulmonary effort is normal.   Musculoskeletal:      Comments: Right hand without swelling, ecchymosis, rashes or wounds. TTP over 5th MCP with diminished ROM in joint secondary to pain. TTP in PIP. Increased pain withplaceing 4th and 5th fingers together   Skin:     General: Skin is warm.   Neurological:      Mental Status: She is alert.     Right hand xr independently reviewed by me contemporaneously as avulsion base of 5th proximal phalanx best seen on AP view    Orthopedic injury treatment    Date/Time: 3/15/2024 11:30 AM    Performed by: Cathi Nassar PA-C  Authorized by: Cathi Nassar PA-C    Patient Location:  Bedside  [place] Protocol:  Consent: Verbal consent obtained.  Risks and benefits: risks, benefits and alternatives were discussed  Consent given by: patient  Patient understanding: patient states understanding of the procedure being performed  Patient identity confirmed: verbally with patient    Injury location:  Finger  Location " details:  Left little finger  Injury type:  Fracture  Fracture type: proximal phalanx    MCP joint involved?: Yes    Any IP joint involved?: No    Neurovascular status: Neurovascularly intact    Immobilization:  Splint  Splint type:  Finger splint, static  Supplies used:  Aluminum splint  Neurovascular status: Neurovascularly intact    Patient tolerance:  Patient tolerated the procedure well with no immediate complications

## 2024-03-18 ENCOUNTER — OFFICE VISIT (OUTPATIENT)
Dept: BARIATRICS | Facility: CLINIC | Age: 59
End: 2024-03-18
Payer: COMMERCIAL

## 2024-03-18 VITALS
TEMPERATURE: 96.4 F | HEIGHT: 67 IN | OXYGEN SATURATION: 96 % | RESPIRATION RATE: 16 BRPM | SYSTOLIC BLOOD PRESSURE: 118 MMHG | HEART RATE: 74 BPM | BODY MASS INDEX: 39.46 KG/M2 | WEIGHT: 251.4 LBS | DIASTOLIC BLOOD PRESSURE: 80 MMHG

## 2024-03-18 DIAGNOSIS — R73.01 IFG (IMPAIRED FASTING GLUCOSE): ICD-10-CM

## 2024-03-18 DIAGNOSIS — R63.5 ABNORMAL WEIGHT GAIN: Primary | ICD-10-CM

## 2024-03-18 PROBLEM — E66.812 CLASS 2 OBESITY: Status: ACTIVE | Noted: 2021-03-09

## 2024-03-18 PROBLEM — I47.20 VT (VENTRICULAR TACHYCARDIA) (HCC): Status: ACTIVE | Noted: 2024-03-18

## 2024-03-18 PROBLEM — E66.9 CLASS 2 OBESITY: Status: ACTIVE | Noted: 2021-03-09

## 2024-03-18 PROCEDURE — 99214 OFFICE O/P EST MOD 30 MIN: CPT | Performed by: PHYSICIAN ASSISTANT

## 2024-03-18 NOTE — PROGRESS NOTES
"Assessment/Plan:    Class 2 obesity  -Patient is pursuing Conservative Program after 12 weeks of Very Low Calorie Diet-VLCD  -AVOID sympathomimetics- hx of VT- has stopped medications as she \"felt better\"- encouraged f/u with cards. Reviewed risks of life threatening arrhythmias   -Initial weight loss goal of 5-10% weight loss for improved health  -Has interest in AOM if covered   -Patient denies personal and family history of MEN2 tumors and medullary thyroid/thyroid carcinoma. Patient denies personal history of pancreatitis.   -Denies glaucoma or kidney stones  -Screening labs: CMP and Mg reviewed from 5/27/21  -looking forward to her 40th anniversary trip to [place] to September   -Has interest in VLCD, but prefers partial meal replacement due to cost. Discussed using our product or OTC protein shakes with fruit. Calories goals discussed. Meal replacement provided.     Class II Obesity --> Overweight  Initial:  241.7   Current: 178   Change: -63.7 lbs  Goal: 140    Follow up in approximately  4 months  with Non-Surgical Physician/Advanced Practitioner.    Goals:  Food log (ie.) [email or web address],[email or web address],[email or web address],[email or web address].[email or web address],etc. baritastic  No sugary beverages. At least 64oz of water daily.  Increase physical activity by 10 minutes daily. Gradually increase physical activity to a goal of 5 days per week for 30 minutes of MODERATE intensity PLUS 2 days per week of FULL BODY resistance training  5-10 servings of fruits and vegetables per day and 25-35 grams of dietary fiber per day, gradually increasing     Diagnoses and all orders for this visit:    Abnormal weight gain  -     Comprehensive metabolic panel; Future  -     Hemoglobin A1C; Future  -     Insulin, fasting; Future  -     Lipid panel; Future  -     TSH, 3rd generation with Free T4 reflex; Future    IFG (impaired fasting glucose)  -     Comprehensive metabolic panel; Future  -     Hemoglobin A1C; Future  -     Insulin, fasting; Future  -   " "  Lipid panel; Future  -     TSH, 3rd generation with Free T4 reflex; Future    Body mass index 39.0-39.9, adult        Subjective:   Chief Complaint   Patient presents with    Follow-up     Patient ID: Kamila Thompson  is a 58 y.o. female with excess weight/obesity here to pursue weight management.  Patient is pursuing Conservative Program.     HPI  The patient presents for [facility] follow up. Last OV 9/13/21    Has had multiple family stressors (father passed), but looking forward to her 40th anniversary trip to [place]     Had increased stress eating snacking and dining out.  Dines out now: once per week     The following portions of the patient's history were reviewed and updated as appropriate: allergies, current medications, past family history, past medical history, past social history, past surgical history, and problem list.    Review of Systems   Respiratory:  Negative for shortness of breath.    Cardiovascular:  Negative for chest pain and palpitations.   Gastrointestinal:  Negative for constipation and diarrhea.   Psychiatric/Behavioral:  Negative for suicidal ideas (Denies HI).         + life stressors, feels down and anxious at times     Objective:    /80   Pulse 74   Temp (!) 96.4 °F (35.8 °C)   Resp 16   Ht 5' 7\" (1.702 m)   Wt 114 kg (251 lb 6.4 oz)   SpO2 96%   BMI 39.37 kg/m²      Physical Exam  Vitals and nursing note reviewed.     Constitutional   General appearance: Abnormal.  well developed and obese.   Pulmonary   Respiratory effort: No increased work of breathing or signs of respiratory distress.   Abdomen   Abdomen: Abnormal.  The abdomen was obese.   Musculoskeletal   Gait and station: Normal.    Psychiatric   Orientation to person, place and time: Normal.    Affect: appropriate  "

## 2024-03-18 NOTE — ASSESSMENT & PLAN NOTE
"-Patient is pursuing Conservative Program after 12 weeks of Very Low Calorie Diet-VLCD  -AVOID sympathomimetics- hx of VT- has stopped medications as she \"felt better\"- encouraged f/u with cards. Reviewed risks of life threatening arrhythmias   -Initial weight loss goal of 5-10% weight loss for improved health  -Has interest in AOM if covered   -Patient denies personal and family history of MEN2 tumors and medullary thyroid/thyroid carcinoma. Patient denies personal history of pancreatitis.   -Denies glaucoma or kidney stones  -Screening labs: CMP and Mg reviewed from 5/27/21  -looking forward to her 40th anniversary trip to [place] to September   -Has interest in VLCD, but prefers partial meal replacement due to cost. Discussed using our product or OTC protein shakes with fruit. Calories goals discussed. Meal replacement provided.     Class II Obesity --> Overweight  Initial:  241.7   Current: 178   Change: -63.7 lbs  Goal: 140  "

## 2024-04-12 ENCOUNTER — OCCMED (OUTPATIENT)
Dept: URGENT CARE | Facility: MEDICAL CENTER | Age: 59
End: 2024-04-12

## 2024-04-12 DIAGNOSIS — Z02.4 ENCOUNTER FOR DEPARTMENT OF TRANSPORTATION (DOT) EXAMINATION FOR DRIVING LICENSE RENEWAL: Primary | ICD-10-CM

## 2024-08-02 ENCOUNTER — OFFICE VISIT (OUTPATIENT)
Dept: URGENT CARE | Facility: MEDICAL CENTER | Age: 59
End: 2024-08-02
Payer: COMMERCIAL

## 2024-08-02 VITALS
OXYGEN SATURATION: 97 % | SYSTOLIC BLOOD PRESSURE: 120 MMHG | DIASTOLIC BLOOD PRESSURE: 52 MMHG | HEIGHT: 67 IN | TEMPERATURE: 97.6 F | RESPIRATION RATE: 15 BRPM | BODY MASS INDEX: 38.45 KG/M2 | WEIGHT: 245 LBS | HEART RATE: 68 BPM

## 2024-08-02 DIAGNOSIS — L25.9 CONTACT DERMATITIS, UNSPECIFIED CONTACT DERMATITIS TYPE, UNSPECIFIED TRIGGER: Primary | ICD-10-CM

## 2024-08-02 PROCEDURE — 99212 OFFICE O/P EST SF 10 MIN: CPT | Performed by: PHYSICIAN ASSISTANT

## 2024-08-02 PROCEDURE — 96372 THER/PROPH/DIAG INJ SC/IM: CPT | Performed by: PHYSICIAN ASSISTANT

## 2024-08-02 RX ORDER — DEXAMETHASONE SODIUM PHOSPHATE 10 MG/ML
10 INJECTION, SOLUTION INTRAMUSCULAR; INTRAVENOUS ONCE
Status: COMPLETED | OUTPATIENT
Start: 2024-08-02 | End: 2024-08-02

## 2024-08-02 RX ORDER — METHYLPREDNISOLONE 4 MG/1
TABLET ORAL
Qty: 1 EACH | Refills: 0 | Status: SHIPPED | OUTPATIENT
Start: 2024-08-02

## 2024-08-02 RX ADMIN — DEXAMETHASONE SODIUM PHOSPHATE 10 MG: 10 INJECTION, SOLUTION INTRAMUSCULAR; INTRAVENOUS at 16:14

## 2024-08-02 NOTE — PROGRESS NOTES
[facility]        NAME: Kamila Thompson is a 58 y.o. female  : 1965    MRN: [id number]  DATE: 2024  TIME: 4:27 PM    Assessment and Plan   Contact dermatitis, unspecified contact dermatitis type, unspecified trigger [L25.9]  1. Contact dermatitis, unspecified contact dermatitis type, unspecified trigger  dexamethasone (PF) (DECADRON) injection 10 mg    methylPREDNISolone 4 MG tablet therapy pack            Patient Instructions     Start Medrol Dose Pack tomorrow if rash has not improved  If symptoms fail to improve follow up with PCP    Follow up with PCP in 3-5 days.  Proceed to  ER if symptoms worsen.    If tests have been performed at [facility], our office will contact you with results if changes need to be made to the care plan discussed with you at the visit.  [facility]    Chief Complaint     Chief Complaint   Patient presents with   • Poison Myra     Poison on face started 2 days ago. OTC Caladryl Clear Liquid applied - did not help. It is spreading. Itching, just started oozing today. Was doing yard work.          History of Present Illness       Patient presents with an itchy rash on her face which continues to spread.  She is exposed to poison ivy and rash started 2 days ago.  Applying Caladryl clear without improvement.  Typically is treated with an injection of steroids followed by Medrol Dosepak.  Denies difficulty breathing.        Review of Systems   Review of Systems   Constitutional:  Negative for chills and fever.   HENT:  Negative for trouble swallowing.    Respiratory:  Negative for chest tightness and shortness of breath.    Musculoskeletal:  Negative for arthralgias.   Skin:  Positive for rash.         Current Medications       Current Outpatient Medications:   •  methylPREDNISolone 4 MG tablet therapy pack, Use as directed on package, Disp: 1 each, Rfl: 0  No current facility-administered medications for this  "visit.    Current Allergies     Allergies as of 2024 - Reviewed 2024   Allergen Reaction Noted   • Amoxicillin-pot clavulanate Hives, Itching, Wheezing, and Facial Swelling 2016   • Bee venom Anaphylaxis 2017   • Celecoxib Rash 2019            The following portions of the patient's history were reviewed and updated as appropriate: allergies, current medications, past family history, past medical history, past social history, past surgical history and problem list.     Past Medical History:   Diagnosis Date   • Arthritis     hand   • Irregular heart beat    • Mononucleosis 2019   • Seasonal allergies        Past Surgical History:   Procedure Laterality Date   • APPENDECTOMY     • BONE EXOSTOSIS EXCISION Right 2020    Procedure: excision right hook of hamate fracture, right guyon canal release;  Surgeon: Jose Chan MD;  Location: [facility] OR;  Service: Orthopedics   •  SECTION     • COLONOSCOPY     • HYSTERECTOMY         • JOINT REPLACEMENT      BTKR       Family History   Problem Relation Age of Onset   • No Known Problems Mother    • No Known Problems Sister    • Diabetes Sister    • Obesity Sister    • No Known Problems Maternal Aunt    • No Known Problems Maternal Aunt    • No Known Problems Paternal Aunt    • Cancer Paternal Aunt    • No Known Problems Paternal Aunt    • Prostate cancer Father    • Diabetes Father    • Heart disease Father    • Cancer Maternal Grandfather         patient not sure what kind   • Stroke Paternal Grandmother    • Prostate cancer Paternal Grandfather    • Diabetes Brother    • Diabetes Brother          Medications have been verified.        Objective   /52   Pulse 68   Temp 97.6 °F (36.4 °C)   Resp 15   Ht 5' 7\" (1.702 m)   Wt 111 kg (245 lb)   SpO2 97%   BMI 38.37 kg/m²   No LMP recorded. Patient has had a hysterectomy.       Physical Exam     Physical Exam  Vitals and nursing note reviewed.   Constitutional:      "  Appearance: Normal appearance.   HENT:      Head: Normocephalic and atraumatic.   Cardiovascular:      Rate and Rhythm: Normal rate.   Pulmonary:      Effort: Pulmonary effort is normal.   Skin:     General: Skin is warm.      Comments: Erythematous maculopapular rash right cheek jawline into neck, no vesicles.  Rashes consistent with contact dermatitis.   Neurological:      Mental Status: She is alert.

## 2024-08-02 NOTE — PATIENT INSTRUCTIONS
Start Medrol Dose Pack tomorrow if rash has not improved  If symptoms fail to improve follow up with PCP

## 2025-01-24 ENCOUNTER — HOSPITAL ENCOUNTER (OUTPATIENT)
Dept: ULTRASOUND IMAGING | Facility: HOSPITAL | Age: 60
Discharge: HOME | End: 2025-01-24
Attending: FAMILY MEDICINE
Payer: COMMERCIAL

## 2025-01-24 DIAGNOSIS — R10.811 RIGHT UPPER QUADRANT ABDOMINAL TENDERNESS, REBOUND TENDERNESS PRESENCE NOT SPECIFIED: ICD-10-CM

## 2025-01-24 PROCEDURE — 76705 ECHO EXAM OF ABDOMEN: CPT

## 2025-02-06 ENCOUNTER — HOSPITAL ENCOUNTER (OUTPATIENT)
Dept: NUCLEAR MEDICINE | Facility: HOSPITAL | Age: 60
Discharge: HOME | End: 2025-02-06
Attending: FAMILY MEDICINE
Payer: COMMERCIAL

## 2025-02-06 DIAGNOSIS — R10.811 RIGHT UPPER QUADRANT ABDOMINAL TENDERNESS, REBOUND TENDERNESS PRESENCE NOT SPECIFIED: ICD-10-CM

## 2025-02-06 PROCEDURE — 78227 HEPATOBIL SYST IMAGE W/DRUG: CPT

## 2025-02-06 PROCEDURE — A9537 TC99M MEBROFENIN: HCPCS

## 2025-02-06 RX ORDER — SINCALIDE 5 UG/5ML
0.02 INJECTION, POWDER, LYOPHILIZED, FOR SOLUTION INTRAVENOUS
Status: COMPLETED | OUTPATIENT
Start: 2025-02-06 | End: 2025-02-06

## 2025-02-06 RX ADMIN — SINCALIDE 2.2 MCG: 5 INJECTION, POWDER, LYOPHILIZED, FOR SOLUTION INTRAVENOUS at 14:21

## 2025-06-27 ENCOUNTER — OFFICE VISIT (OUTPATIENT)
Dept: URGENT CARE | Facility: MEDICAL CENTER | Age: 60
End: 2025-06-27
Payer: COMMERCIAL

## 2025-06-27 VITALS
HEIGHT: 67 IN | BODY MASS INDEX: 38.92 KG/M2 | HEART RATE: 70 BPM | WEIGHT: 248 LBS | TEMPERATURE: 98.9 F | OXYGEN SATURATION: 99 % | RESPIRATION RATE: 18 BRPM

## 2025-06-27 DIAGNOSIS — L03.114 CELLULITIS OF LEFT UPPER EXTREMITY: Primary | ICD-10-CM

## 2025-06-27 PROCEDURE — 99213 OFFICE O/P EST LOW 20 MIN: CPT

## 2025-06-27 RX ORDER — BENZOCAINE/MENTHOL 6 MG-10 MG
LOZENGE MUCOUS MEMBRANE 4 TIMES DAILY PRN
Qty: 1.5 G | Refills: 0 | Status: SHIPPED | OUTPATIENT
Start: 2025-06-27

## 2025-06-27 RX ORDER — OMEPRAZOLE 20 MG/1
CAPSULE, DELAYED RELEASE ORAL
COMMUNITY
Start: 2025-05-21

## 2025-06-27 RX ORDER — DOXYCYCLINE HYCLATE 100 MG
100 TABLET ORAL 2 TIMES DAILY
Qty: 14 TABLET | Refills: 0 | Status: SHIPPED | OUTPATIENT
Start: 2025-06-27 | End: 2025-07-04

## 2025-06-27 RX ORDER — CETIRIZINE HYDROCHLORIDE 10 MG/1
10 TABLET ORAL 2 TIMES DAILY
Qty: 28 TABLET | Refills: 0 | Status: SHIPPED | OUTPATIENT
Start: 2025-06-27 | End: 2025-07-11

## 2025-06-27 NOTE — PATIENT INSTRUCTIONS
Doxycycline may cause your skin to be more sensitive to sunlight than it is normally. Exposure to sunlight, even for short periods of time, may cause skin rash, itching, redness or other discoloration of the skin, or a severe sunburn. Practice proper precautions including avoiding excessive sunlight exposure, wear skin protection including clothing and sunscreen to avoid reaction.     Doxycycline as directed   Zyrtec as directed  Hydrocortisone as directed     Follow up with PCP in 3-5 days.  Proceed to  ER if symptoms worsen.    If tests are performed, our office will contact you with results only if changes need to made to the care plan discussed with you at the visi

## 2025-06-27 NOTE — PROGRESS NOTES
[facility]        NAME: Kamila Thompson is a 59 y.o. female  : 1965    MRN: [id number]  DATE: 2025  TIME: 11:11 AM    Assessment and Plan   Cellulitis of left upper extremity [L03.114]  1. Cellulitis of left upper extremity  hydrocortisone 1 % cream    cetirizine (ZyrTEC) 10 mg tablet    doxycycline hyclate (VIBRA-TABS) 100 mg tablet      Left hand swollen with tenderness to touch mild erythema noted.   Will treat with doxy as patient is concerned due to history of cellulitis and unable to see pcp    Patient Instructions   Doxycycline may cause your skin to be more sensitive to sunlight than it is normally. Exposure to sunlight, even for short periods of time, may cause skin rash, itching, redness or other discoloration of the skin, or a severe sunburn. Practice proper precautions including avoiding excessive sunlight exposure, wear skin protection including clothing and sunscreen to avoid reaction.     Doxycycline as directed   Zyrtec as directed  Hydrocortisone as directed     Follow up with PCP in 3-5 days.  Proceed to  ER if symptoms worsen.    If tests are performed, our office will contact you with results only if changes need to made to the care plan discussed with you at the visit. You can review your full results on [facility].    Chief Complaint     Chief Complaint   Patient presents with    Insect Bite     Was strung by 2 wasps on Wednesday in left hand. Hand is swollen and warm to the touch and some pain noted          History of Present Illness       Patient presents with left hand swelling and redness after she was stung by a bee two days ago. She is concerned for cellulitis and cannot get into pcp        Review of Systems   Review of Systems   Constitutional:  Negative for chills and fever.   HENT:  Negative for ear pain and sore throat.    Eyes:  Negative for pain and visual disturbance.   Respiratory:  Negative for cough and shortness of breath.   "  Cardiovascular:  Negative for chest pain and palpitations.   Gastrointestinal:  Negative for abdominal pain and vomiting.   Genitourinary:  Negative for dysuria and hematuria.   Musculoskeletal:  Negative for arthralgias and back pain.   Skin:  Positive for rash. Negative for color change.   Neurological:  Negative for seizures and syncope.   All other systems reviewed and are negative.        Current Medications     Current Medications[1]    Current Allergies     Allergies as of 06/27/2025 - Reviewed 06/27/2025   Allergen Reaction Noted    Amoxicillin-pot clavulanate Hives, Itching, Wheezing, and Facial Swelling 12/02/2016    Bee venom Anaphylaxis 12/12/2017    Celecoxib Rash 09/07/2019            The following portions of the patient's history were reviewed and updated as appropriate: allergies, current medications, past family history, past medical history, past social history, past surgical history and problem list.     Past Medical History[2]    Past Surgical History[3]    Family History[4]      Medications have been verified.        Objective   Pulse 70   Temp 98.9 °F (37.2 °C)   Resp 18   Ht 5' 7\" (1.702 m)   Wt 112 kg (248 lb)   SpO2 99%   BMI 38.84 kg/m²        Physical Exam     Physical Exam  Vitals and nursing note reviewed.   Constitutional:       General: She is not in acute distress.     Appearance: Normal appearance. She is not toxic-appearing.   HENT:      Head: Normocephalic.     Cardiovascular:      Rate and Rhythm: Normal rate and regular rhythm.      Pulses: Normal pulses.      Heart sounds: Normal heart sounds. No murmur heard.  Pulmonary:      Effort: Pulmonary effort is normal.      Breath sounds: Normal breath sounds. No wheezing, rhonchi or rales.     Skin:          Comments: Left hand swollen with tenderness to touch mild erythema noted.      Neurological:      Mental Status: She is alert.                        [1]   Current Outpatient Medications:     cetirizine (ZyrTEC) 10 mg " tablet, Take 1 tablet (10 mg total) by mouth in the morning and 1 tablet (10 mg total) before bedtime. Do all this for 14 days., Disp: 28 tablet, Rfl: 0    doxycycline hyclate (VIBRA-TABS) 100 mg tablet, Take 1 tablet (100 mg total) by mouth 2 (two) times a day for 7 days, Disp: 14 tablet, Rfl: 0    hydrocortisone 1 % cream, Apply topically 4 (four) times a day as needed for irritation or rash, Disp: 1.5 g, Rfl: 0    omeprazole (PriLOSEC) 20 mg delayed release capsule, , Disp: , Rfl:     methylPREDNISolone 4 MG tablet therapy pack, Use as directed on package (Patient not taking: Reported on 2025), Disp: 1 each, Rfl: 0  [2]   Past Medical History:  Diagnosis Date    Arthritis     hand    Irregular heart beat     Mononucleosis 2019    Seasonal allergies    [3]   Past Surgical History:  Procedure Laterality Date    APPENDECTOMY      BONE EXOSTOSIS EXCISION Right 2020    Procedure: excision right hook of hamate fracture, right guyon canal release;  Surgeon: Jose Chan MD;  Location: [facility];  Service: Orthopedics     SECTION      COLONOSCOPY      HYSTERECTOMY          JOINT REPLACEMENT      BTKR   [4]   Family History  Problem Relation Name Age of Onset    No Known Problems Mother      No Known Problems Sister jaci     Diabetes Sister deena     Obesity Sister deena     No Known Problems Maternal Aunt herb     No Known Problems Maternal Aunt leah     No Known Problems Paternal Aunt amber     Cancer Paternal Aunt shilpi     No Known Problems Paternal Aunt ifeoma     Prostate cancer Father      Diabetes Father      Heart disease Father      Cancer Maternal Grandfather          patient not sure what kind    Stroke Paternal Grandmother      Prostate cancer Paternal Grandfather      Diabetes Brother      Diabetes Brother

## (undated) DEVICE — INTENDED FOR TISSUE SEPARATION, AND OTHER PROCEDURES THAT REQUIRE A SHARP SURGICAL BLADE TO PUNCTURE OR CUT.: Brand: BARD-PARKER ® SAFETYLOCK CARBON RIB-BACK BLADES

## (undated) DEVICE — 3M™ STERI-STRIP™ REINFORCED ADHESIVE SKIN CLOSURES, R1547, 1/2 IN X 4 IN (12 MM X 100 MM), 6 STRIPS/ENVELOPE: Brand: 3M™ STERI-STRIP™

## (undated) DEVICE — SUT MONOCRYL 4-0 PS-2 27 IN Y426H

## (undated) DEVICE — BONE WAX WHITE: Brand: BONE WAX WHITE

## (undated) DEVICE — PAD CAST 4 IN COTTON NON STERILE

## (undated) DEVICE — SCD SEQUENTIAL COMPRESSION COMFORT SLEEVE MEDIUM KNEE LENGTH: Brand: KENDALL SCD

## (undated) DEVICE — PADDING CAST 4 IN  COTTON STRL

## (undated) DEVICE — DRAPE SHEET THREE QUARTER

## (undated) DEVICE — PAD GROUNDING ADULT

## (undated) DEVICE — SYRINGE 30ML LL

## (undated) DEVICE — SUT ETHILON 4-0 PS-2 18 IN 1667H

## (undated) DEVICE — TIBURON HAND DRAPE: Brand: CONVERTORS

## (undated) DEVICE — STERILE POLYISOPRENE POWDER-FREE SURGICAL GLOVES: Brand: PROTEXIS

## (undated) DEVICE — STANDARD SURGICAL GOWN, L: Brand: CONVERTORS

## (undated) DEVICE — DRAPE C-ARM X-RAY

## (undated) DEVICE — STERILE POLYISOPRENE POWDER-FREE SURGICAL GLOVES WITH EMOLLIENT COATING: Brand: PROTEXIS

## (undated) DEVICE — SPLINT 4 X 15 IN FAST SET PLASTER

## (undated) DEVICE — TUBING SUCTION 5MM X 12 FT

## (undated) DEVICE — BETHLEHEM UNIVERSAL  MIONR EXT: Brand: CARDINAL HEALTH

## (undated) DEVICE — NEEDLE 25G X 1 1/2

## (undated) DEVICE — CABLE BIPOLAR DISP MEGADYNE

## (undated) DEVICE — PENCIL ELECTROSURG E-Z CLEAN -0035H

## (undated) DEVICE — NEEDLE BLUNT 18 G X 1 1/2IN

## (undated) DEVICE — ACE WRAP 4 IN UNSTERILE

## (undated) DEVICE — CHLORAPREP HI-LITE 26ML ORANGE

## (undated) DEVICE — GAUZE SPONGES,16 PLY: Brand: CURITY

## (undated) DEVICE — CURITY NON-ADHERENT STRIPS: Brand: CURITY

## (undated) DEVICE — SINGLE PORT MANIFOLD: Brand: NEPTUNE 2

## (undated) DEVICE — CUFF TOURNIQUET DISP SZ18